# Patient Record
Sex: MALE | Race: WHITE | Employment: OTHER | URBAN - METROPOLITAN AREA
[De-identification: names, ages, dates, MRNs, and addresses within clinical notes are randomized per-mention and may not be internally consistent; named-entity substitution may affect disease eponyms.]

---

## 2017-07-07 ENCOUNTER — HOSPITAL ENCOUNTER (OUTPATIENT)
Dept: MRI IMAGING | Age: 57
Discharge: HOME | End: 2017-07-07
Attending: FAMILY MEDICINE
Payer: COMMERCIAL

## 2017-07-07 DIAGNOSIS — G89.29 CHRONIC LOW BACK PAIN, UNSPECIFIED BACK PAIN LATERALITY, WITH SCIATICA PRESENCE UNSPECIFIED: ICD-10-CM

## 2017-07-07 DIAGNOSIS — M54.5 CHRONIC LOW BACK PAIN, UNSPECIFIED BACK PAIN LATERALITY, WITH SCIATICA PRESENCE UNSPECIFIED: ICD-10-CM

## 2017-07-07 PROCEDURE — 72148 MRI LUMBAR SPINE W/O DYE: CPT

## 2017-08-09 ENCOUNTER — HOSPITAL ENCOUNTER (OUTPATIENT)
Dept: PHYSICAL THERAPY | Age: 57
Discharge: HOME | End: 2017-08-09
Payer: COMMERCIAL

## 2017-08-09 PROCEDURE — 97162 PT EVAL MOD COMPLEX 30 MIN: CPT

## 2017-08-09 NOTE — PROGRESS NOTES
Matilde Ricks  : 1960 [address] at [place] [address] [address] [place] [address], [facility], [address] [place] [address]  [phone]   [phone]         OUTPATIENT PHYSICAL THERAPY:Initial Assessment 2017    ICD-10: Treatment Diagnosis: low back pain (M54.5)  Precautions/Allergies:   Review of patient's allergies indicates no known allergies. Fall Risk Score: 2 (? 5 = High Risk)  MD Orders: evaluate and treat MEDICAL/REFERRING DIAGNOSIS:  Low back pain [M54.5]   DATE OF ONSET: chronic   REFERRING PHYSICIAN: Milagro Stephenson MD  RETURN PHYSICIAN APPOINTMENT: 10/9/17     INITIAL ASSESSMENT:  Mr. Kris Ochoa presents significant functional limitations due to back pain. PT evaluation reveals significant weakness of core and LE musculature, decreased functional AROM lumbar and B hip joints, altered spinal alignment, and poor pain management. Pt would highly benefit from skilled PT to address problems below and improve overall functional mobility. PROBLEM LIST (Impacting functional limitations):  1. Decreased Strength  2. Decreased ADL/Functional Activities  3. Increased Pain  4. Decreased Flexibility/Joint Mobility  5. Decreased [place] with Home Exercise Program INTERVENTIONS PLANNED:  1. Cold  2. Heat  3. Home Exercise Program (HEP)  4. Manual Therapy  5. Neuromuscular Re-education/Strengthening  6. Range of Motion (ROM)  7. Therapeutic Exercise/Strengthening   TREATMENT PLAN:  Effective Dates: 17 TO 10/9/17. Frequency/Duration: 3 times a week for 6 weeks  GOALS: (Goals have been discussed and agreed upon with patient.)  SHORT-TERM FUNCTIONAL GOALS: Time Frame: 2-3 weeks   1. Pt will be independent with HEP focusing on core stability and promoting good spinal alignment. 2. Pt will report no symptoms of LE for 1-2 weeks demonstrating centralization of symptoms.   3. Pt will report pain level does not exceed 5/10 in a 1-2 week period of time to demonstrate pain management. 4. Pt will report improved sleep with less disturbance from back pain. DISCHARGE GOALS: Time Frame: 4-6 weeks   1. Pt will improve Oswestry score by at least 10 points. 2. Pt will demonstrate increased AROM of lumbar spine all planes ~ 25% or greater without report of pain to improve functional mobility. 3. Pt will be able to sustain regular exercise routine including aerobic exercise 3+ times per week without increased symptoms to encourage healthy lifestyle. 4. Pt will demonstrate increased B LE strength to at least 4+/5 B to aid with functional mobility. Rehabilitation Potential For Stated Goals: Good  Regarding Marvin Hammond's therapy, I certify that the treatment plan above will be carried out by a therapist or under their direction. Thank you for this referral,  Pauly Lopez, PT     Referring Physician Signature: Izaiah Rosales MD              Date                    The information in this section was collected on 8/7/17 (except where otherwise noted). HISTORY:   History of Present Injury/Illness (Reason for Referral):  Pt reports several years low back pain with a severe episode after lifting heavy objects in 2013 causing left low back L LE pain. Pt states that he has been receiving chiropractic treatments since then for pain management however, pain continued to bother him especially during/after work shifts at [employer]. He states that he is on his feet on concrete all day long and recently back pain interfering with job activities. In 3 months ago, pt reports he called off work due to pain too severe to perform work activities. Went to MD, imaging showing disc protrusion at L3 and stenosis at L1-2,  L5-S1. Referred to neuro specialist. Epidural injection ordered (not scheduled yet). PT referral with Neuro follow up in 4 weeks. Present symptoms (on day of initial evaluation): constant low back aching, mid to low back.   Occasional numbness of B LE (R>L) · Aggravating factors: standing on hard surfaces 5 min, walking, stairs ambulation, bending   · Relieving factors: recliner, pain medication     · Pain level: 6/10 presently, 9/10 worst, 5/10 best     Past Medical History/Comorbidities:   Mr. Markos Begum  has a past medical history of Allergic rhinitis due to allergen; Arthritis; Colon polyps (8/12/2016); Deficiency, lipoprotein (8/12/2016); Diabetes mellitus type 2, controlled ([facility] [address] (8/12/2016); Encounter for long-term (current) use of medications (8/12/2016); HLD (hyperlipidemia) (8/12/2016); HTN (hypertension) (8/12/2016); Obesity (8/12/2016); Other abnormal glucose (8/12/2016); Sinusitis, acute maxillary (8/12/2016); and URI (upper respiratory infection) (8/12/2016). Mr. Markos Begum  has a past surgical history that includes tonsillectomy; adenoidectomy; and other surgical (2005). Social History/Living Environment:     lives with spouse   Prior Level of Function/Work/Activity:  Currently on disability   Dominant Side:         RIGHT  Other Clinical Tests:          MRI lumbar spine: (copied from radiology)   Disc desiccation and mild degenerative changes and facet arthropathy  throughout the lumbar spine. There is a significant central stenosis at L1-2;  the thecal sac measures 6 to 7 mm in AP diameter. At L4-3-4 there is a  broad-based left lateral disc protrusion impinging the enlarged L3 nerve root. Moderate foraminal stenoses at L5-S1  Previous Treatment Approaches:          Chiropractor   Current Medications:       Current Outpatient Prescriptions:     multivitamin (ONE A DAY) tablet, Take 1 Tab by mouth daily. , Disp: , Rfl:     Insulin Needles, Disposable, (NOVOFINE 32) 32 gauge x 1/4\" ndle, 1 misc daily. For Victoza pen, Disp: 100 Pen Needle, Rfl: 3    traMADol (ULTRAM) 50 mg tablet, Take 1 Tab by mouth every six (6) hours as needed for Pain. Max Daily Amount: 200 mg.  Indications: Pain, Disp: 120 Tab, Rfl: 2    metoprolol succinate (TOPROL-XL) 100 mg tablet, Take 1 Tab by mouth daily. , Disp: 90 Tab, Rfl: 3    metroNIDAZOLE (METROGEL) 1 % topical gel, Apply 1 g to affected area daily. Use a thin layer to affected areas after washing, Disp: 60 g, Rfl: 5    lansoprazole (PREVACID) 30 mg capsule, Take 1 Cap by mouth Daily (before breakfast). , Disp: 90 Cap, Rfl: 3    lisinopril-hydroCHLOROthiazide (PRINZIDE, ZESTORETIC) 20-12.5 mg per tablet, Take 1 Tab by mouth daily. , Disp: 90 Tab, Rfl: 3    simvastatin (ZOCOR) 40 mg tablet, Take 1 Tab by mouth daily. , Disp: 90 Tab, Rfl: 3    Liraglutide (VICTOZA) 0.6 mg/0.1 mL (18 mg/3 mL) sub-q pen, 19mg/3ml pen         1.8mg per day  Indications: type 2 diabetes mellitus, Disp: 9 Syringe, Rfl: 3    doxycycline (ADOXA) 100 mg tablet, Take 1 Tab by mouth two (2) times a day. Indications: ACNE ROSACEA, Disp: 100 Tab, Rfl: 2    PEN NEEDLE, DIABETIC (NOVOFINE 32), 32 g by Does Not Apply route daily.  Indications: 32G x 6 MM,, Disp: , Rfl:    Date Last Reviewed:  8/9/2017     Number of Personal Factors/Comorbidities that affect the Plan of Care: 1-2: MODERATE COMPLEXITY   EXAMINATION:   Observation/Orthostatic Postural Assessment:          Standing:  · Left scap elevated   · B hips in slight flexion   · Decreased lumbar lordotic curve  · Left lumbar shift   Palpation:         Tender of mid lumbar region   ROM:            Lumbar spine Date:  8/9/17 Date:   Date:     Direction  Parameters Parameters Parameters   Flexion  25% cs     Extension  25%     Rotation  R: 25%  L: 15%     Side bending  R: 50%  L: 25% cs      Hip IR Very limited B      Hip ER Mild limitation B cs     Hip flexion  R: 90 degrees  L: 100 degrees cs     Cs= comparable sign (symptoms provoked)    Strength:            Lower quadrant    DATE  8/9/17 DATE     Hip flexion R: 4-  L: 4- R:   L:    Hip Abduction  R: 4-  L: 4- R:   L:    Hip Extension  R: --  L: -- R:   L:    Knee Flexion  R: 4  L: 4- R:   L:    Knee Extension  R: 4+  L: 4- R:   L:    Ankle Dorsiflexion  R: 4 L: 4 R:   L:   Ankle Plantarflexion  R: 4  L:4  R:  L:          Special Tests:          None tested   Neurological Screen:        Myotomes: weakness generalized, not specific to lumbar nerve roots         Sensation: intact B LE         Reflexes: will test next visit   Functional Mobility:         Gait/Ambulation:  WBOS, ER B hips, increased lateral sway. Transfers:  Use of B UE sit to stand         Stairs:  Not tested    Body Structures Involved:  1. Joints  2. Muscles Body Functions Affected:  1. Sensory/Pain  2. Neuromusculoskeletal  3. Movement Related Activities and Participation Affected:  1. General Tasks and Demands  2. Mobility  3. Community, Social and [place]   Number of elements that affect the Plan of Care: 3: MODERATE COMPLEXITY   CLINICAL PRESENTATION:   Presentation: Evolving clinical presentation with changing clinical characteristics: MODERATE COMPLEXITY   CLINICAL DECISION MAKING:   Outcome Measure: Tool Used: Modified Oswestry Low Back Pain Questionnaire  Score:  Initial: 38/50  Most Recent: X/50 (Date: -- )   Interpretation of Score: Each section is scored on a 0-5 scale, 5 representing the greatest disability. The scores of each section are added together for a total score of 50. Medical Necessity:   · Patient is expected to demonstrate progress in strength and range of motion to decrease assistance required with ADLs as well as work related activities. .  Reason for Services/Other Comments:  · Patient continues to require modification of therapeutic interventions to increase complexity of exercises.    Use of outcome tool(s) and clinical judgement create a POC that gives a: Questionable prediction of patient's progress: MODERATE COMPLEXITY            TREATMENT:   (In addition to Assessment/Re-Assessment sessions the following treatments were rendered)  Pre-treatment Symptoms/Complaints:  See above    Pain: Initial:     6/10 Post Session:  6/10     THERAPEUTIC EXERCISE: (5 minutes):  Exercises per grid below to improve mobility, strength and coordination. Required moderate visual, verbal and manual cues to promote proper body alignment, promote proper body posture and promote proper body mechanics. Progressed resistance, range and repetitions as indicated. Date:  8/9/17 Date:   Date:     Activity/Exercise Parameters Parameters Parameters   Education  Breathing, decreasing muscle guarding      LTR X 5 B      nustep  Next visit      Anterior/posterior hip stretching  Next visit      bridge Next visit      multif (opp heel and elbow into mat)--supine  Next visit              Treatment/Session Assessment:    · Response to Treatment:  Pt needed cuing not to hold breathe and to decreased muscle guarding during HEP. · Compliance with Program/Exercises: Will assess as treatment progresses.   · Recommendations/Intent for next treatment session:  Mobility exercises, ice, manual mobs to improve intervertebral movement       Future Appointments  Date Time Provider Jonas [place]   8/11/2017 10:00 AM Sidney Morales, PT [facility]   8/14/2017 10:00 AM Sidney Morales, PT [facility]   8/16/2017 11:00 AM David Blunt, PT [facility]   8/18/2017 10:00 AM Sidney Morales, PT [facility]   8/21/2017 11:00 AM Manchester Blunt, PT [facility]   8/23/2017 11:00 AM Manchester Blunt, PT [facility]   8/25/2017 11:00 AM David Blunt, PT [facility]   8/28/2017 11:00 AM David Blunt, PT [facility]   8/30/2017 11:00 AM David Blunt, PT [facility]   9/1/2017 10:00 AM Sidney Morales, PT [facility]   9/6/2017 11:00 AM Manchester Blunt, PT [facility]   9/8/2017 10:00 AM Sidney Morales, PT [facility]   9/11/2017 11:00 AM Manchester Blunt, PT [facility]   9/13/2017 10:00 AM David Blunt, PT [facility]   9/15/2017 11:00 AM Manchester Blunt, PT [facility]   10/9/2017 12:30 PM Aleksandr Rodriguez MD [address]   10/11/2017 3:00 PM Travon Adams MD [facility]   10/23/2017 10:00 AM No Todd, [facility]       Total Treatment Duration:  PT Patient Time In/Time Out  Time In: 0370  Time Out: [id number] [facility],

## 2017-08-09 NOTE — PROGRESS NOTES
Ambulatory/Rehab Services H2 Model Falls Risk Assessment    Risk Factor Pts. ·   Confusion/Disorientation/Impulsivity  []    4 ·   Symptomatic Depression  []   2 ·   Altered Elimination  []   1 ·   Dizziness/Vertigo  []   1 ·   Gender (Male)  [x]   1 ·   Any administered antiepileptics (anticonvulsants):  []   2 ·   Any administered benzodiazepines:  []   1 ·   Visual Impairment (specify):  []   1 ·   Portable Oxygen Use  []   1 ·   Orthostatic ? BP  []   1 ·   History of Recent Falls (within 3 mos.)  []   5     Ability to Rise from Chair (choose one) Pts. ·   Ability to rise in a single movement  []   0 ·   Pushes up, successful in one attempt  [x]   1 ·   Multiple attempts, but successful  []   3 ·   Unable to rise without assistance  []   4   Total: (5 or greater = High Risk) 2     Falls Prevention Plan:   []                Physical Limitations to Exercise (specify):   []                Mobility Assistance Device (type):   []                Exercise/Equipment Adaptation (specify):    ©2010 [facility] of [address] Patent #[id number].  Federal Law prohibits the replication, distribution or use without written permission from [facility] [employer]

## 2017-08-11 ENCOUNTER — HOSPITAL ENCOUNTER (OUTPATIENT)
Dept: PHYSICAL THERAPY | Age: 57
Discharge: HOME | End: 2017-08-11
Payer: COMMERCIAL

## 2017-08-11 PROCEDURE — 97110 THERAPEUTIC EXERCISES: CPT | Performed by: PHYSICAL THERAPIST

## 2017-08-11 NOTE — PROGRESS NOTES
Lennox Zamora  : 1960 [id number] at [place] [address] [address] [facility], [address]  [phone]   [phone]         OUTPATIENT PHYSICAL THERAPY:Daily Note 2017    ICD-10: Treatment Diagnosis: low back pain (M54.5)  Precautions/Allergies:   Review of patient's allergies indicates no known allergies. Fall Risk Score: 2 (? 5 = High Risk)  MD Orders: evaluate and treat MEDICAL/REFERRING DIAGNOSIS:  Low back pain [M54.5]   DATE OF ONSET: chronic   REFERRING PHYSICIAN: Harpal Worley MD  RETURN PHYSICIAN APPOINTMENT: 10/9/17     INITIAL ASSESSMENT:  Mr. Tracy Hogan presents significant functional limitations due to back pain. PT evaluation reveals significant weakness of core and LE musculature, decreased functional AROM lumbar and B hip joints, altered spinal alignment, and poor pain management. Pt would highly benefit from skilled PT to address problems below and improve overall functional mobility. PROBLEM LIST (Impacting functional limitations):  1. Decreased Strength  2. Decreased ADL/Functional Activities  3. Increased Pain  4. Decreased Flexibility/Joint Mobility  5. Decreased [place] with Home Exercise Program INTERVENTIONS PLANNED:  1. Cold  2. Heat  3. Home Exercise Program (HEP)  4. Manual Therapy  5. Neuromuscular Re-education/Strengthening  6. Range of Motion (ROM)  7. Therapeutic Exercise/Strengthening   TREATMENT PLAN:  Effective Dates: 17 TO 10/9/17. Frequency/Duration: 3 times a week for 6 weeks  GOALS: (Goals have been discussed and agreed upon with patient.)  SHORT-TERM FUNCTIONAL GOALS: Time Frame: 2-3 weeks   1. Pt will be independent with HEP focusing on core stability and promoting good spinal alignment. 2. Pt will report no symptoms of LE for 1-2 weeks demonstrating centralization of symptoms. 3. Pt will report pain level does not exceed 5/10 in a 1-2 week period of time to demonstrate pain management.   4. Pt will report improved sleep with less disturbance from back pain. DISCHARGE GOALS: Time Frame: 4-6 weeks   1. Pt will improve Oswestry score by at least 10 points. 2. Pt will demonstrate increased AROM of lumbar spine all planes ~ 25% or greater without report of pain to improve functional mobility. 3. Pt will be able to sustain regular exercise routine including aerobic exercise 3+ times per week without increased symptoms to encourage healthy lifestyle. 4. Pt will demonstrate increased B LE strength to at least 4+/5 B to aid with functional mobility. Rehabilitation Potential For Stated Goals: Good  Regarding Shania Watermancristopher Hammond's therapy, I certify that the treatment plan above will be carried out by a therapist or under their direction. Thank you for this referral,  Gaviota Jenkins, PT               The information in this section was collected on 8/7/17 (except where otherwise noted). HISTORY:   History of Present Injury/Illness (Reason for Referral):  Pt reports several years low back pain with a severe episode after lifting heavy objects in 2013 causing left low back L LE pain. Pt states that he has been receiving chiropractic treatments since then for pain management however, pain continued to bother him especially during/after work shifts at [employer]. He states that he is on his feet on concrete all day long and recently back pain interfering with job activities. In 3 months ago, pt reports he called off work due to pain too severe to perform work activities. Went to MD, imaging showing disc protrusion at L3 and stenosis at L1-2,  L5-S1. Referred to neuro specialist. Epidural injection ordered (not scheduled yet). PT referral with Neuro follow up in 4 weeks. Present symptoms (on day of initial evaluation): constant low back aching, mid to low back.   Occasional numbness of B LE (R>L)      · Aggravating factors: standing on hard surfaces 5 min, walking, stairs ambulation, bending   · Relieving factors: recliner, pain medication     · Pain level: 6/10 presently, 9/10 worst, 5/10 best     Past Medical History/Comorbidities:   Mr. Francis Olson  has a past medical history of Allergic rhinitis due to allergen; Arthritis; Colon polyps (8/12/2016); Deficiency, lipoprotein (8/12/2016); Diabetes mellitus type 2, controlled ([facility] [address] (8/12/2016); Encounter for long-term (current) use of medications (8/12/2016); HLD (hyperlipidemia) (8/12/2016); HTN (hypertension) (8/12/2016); Obesity (8/12/2016); Other abnormal glucose (8/12/2016); Sinusitis, acute maxillary (8/12/2016); and URI (upper respiratory infection) (8/12/2016). Mr. Francis Olson  has a past surgical history that includes tonsillectomy; adenoidectomy; and other surgical (2005). Social History/Living Environment:     lives with spouse   Prior Level of Function/Work/Activity:  Currently on disability   Dominant Side:         RIGHT  Other Clinical Tests:          MRI lumbar spine: (copied from radiology)   Disc desiccation and mild degenerative changes and facet arthropathy  throughout the lumbar spine. There is a significant central stenosis at L1-2;  the thecal sac measures 6 to 7 mm in AP diameter. At L4-3-4 there is a  broad-based left lateral disc protrusion impinging the enlarged L3 nerve root. Moderate foraminal stenoses at L5-S1  Previous Treatment Approaches:          Chiropractor   Current Medications:       Current Outpatient Prescriptions:     multivitamin (ONE A DAY) tablet, Take 1 Tab by mouth daily. , Disp: , Rfl:     Insulin Needles, Disposable, (NOVOFINE 32) 32 gauge x 1/4\" ndle, 1 misc daily. For Victoza pen, Disp: 100 Pen Needle, Rfl: 3    traMADol (ULTRAM) 50 mg tablet, Take 1 Tab by mouth every six (6) hours as needed for Pain. Max Daily Amount: 200 mg. Indications: Pain, Disp: 120 Tab, Rfl: 2    metoprolol succinate (TOPROL-XL) 100 mg tablet, Take 1 Tab by mouth daily. , Disp: 90 Tab, Rfl: 3    metroNIDAZOLE (METROGEL) 1 % topical gel, Apply 1 g to affected area daily. Use a thin layer to affected areas after washing, Disp: 60 g, Rfl: 5    lansoprazole (PREVACID) 30 mg capsule, Take 1 Cap by mouth Daily (before breakfast). , Disp: 90 Cap, Rfl: 3    lisinopril-hydroCHLOROthiazide (PRINZIDE, ZESTORETIC) 20-12.5 mg per tablet, Take 1 Tab by mouth daily. , Disp: 90 Tab, Rfl: 3    simvastatin (ZOCOR) 40 mg tablet, Take 1 Tab by mouth daily. , Disp: 90 Tab, Rfl: 3    Liraglutide (VICTOZA) 0.6 mg/0.1 mL (18 mg/3 mL) sub-q pen, 19mg/3ml pen         1.8mg per day  Indications: type 2 diabetes mellitus, Disp: 9 Syringe, Rfl: 3    doxycycline (ADOXA) 100 mg tablet, Take 1 Tab by mouth two (2) times a day. Indications: ACNE ROSACEA, Disp: 100 Tab, Rfl: 2    PEN NEEDLE, DIABETIC (NOVOFINE 32), 32 g by Does Not Apply route daily.  Indications: 32G x 6 MM,, Disp: , Rfl:    Date Last Reviewed:  8/11/2017     EXAMINATION:   Observation/Orthostatic Postural Assessment:          Standing:  · Left scap elevated   · B hips in slight flexion   · Decreased lumbar lordotic curve  · Left lumbar shift   Palpation:         Tender of mid lumbar region   ROM:            Lumbar spine Date:  8/9/17 Date:   Date:     Direction  Parameters Parameters Parameters   Flexion  25% cs     Extension  25%     Rotation  R: 25%  L: 15%     Side bending  R: 50%  L: 25% cs      Hip IR Very limited B      Hip ER Mild limitation B cs     Hip flexion  R: 90 degrees  L: 100 degrees cs     Cs= comparable sign (symptoms provoked)    Strength:            Lower quadrant    DATE  8/9/17 DATE     Hip flexion R: 4-  L: 4- R:   L:    Hip Abduction  R: 4-  L: 4- R:   L:    Hip Extension  R: --  L: -- R:   L:    Knee Flexion  R: 4  L: 4- R:   L:    Knee Extension  R: 4+  L: 4- R:   L:    Ankle Dorsiflexion  R: 4   L: 4 R:   L:   Ankle Plantarflexion  R: 4  L:4  R:  L:          Special Tests:          None tested   Neurological Screen:        Myotomes: weakness generalized, not specific to lumbar nerve roots Sensation: intact B LE         Reflexes: will test next visit   Functional Mobility:         Gait/Ambulation:  WBOS, ER B hips, increased lateral sway. Transfers:  Use of B UE sit to stand         Stairs:  Not tested    CLINICAL DECISION MAKING:   Outcome Measure: Tool Used: Modified Oswestry Low Back Pain Questionnaire  Score:  Initial: 38/50  Most Recent: X/50 (Date: -- )   Interpretation of Score: Each section is scored on a 0-5 scale, 5 representing the greatest disability. The scores of each section are added together for a total score of 50. Medical Necessity:   · Patient is expected to demonstrate progress in strength and range of motion to decrease assistance required with ADLs as well as work related activities. .  Reason for Services/Other Comments:  · Patient continues to require modification of therapeutic interventions to increase complexity of exercises. TREATMENT:   (In addition to Assessment/Re-Assessment sessions the following treatments were rendered)  Pre-treatment Symptoms/Complaints: Pt states \" I can really feel those exercises in my back. \" Pt c/o lumbar and hip soreness this am. He states compliance with HEP 2-3x/day. Pain: Initial:     6-7/10 Post Session:  5-6/10     THERAPEUTIC EXERCISE: (50minutes):  Exercises per grid below to improve mobility, strength and coordination. Required moderate visual, verbal and manual cues to promote proper body alignment, promote proper body posture and promote proper body mechanics. Progressed resistance, range and repetitions as indicated.    Date:  8/9/17 Date:  8/11/17 Date:     Activity/Exercise Parameters Parameters Parameters   Education  Breathing, decreasing muscle guarding  HEP 2-3x/daily,Log-rolling, breathing out while tightening muscles and in when relaxing    LTR X 5 B  12x B     nustep  Next visit  0 resist/5min    TAs  15 x 5 sec    Supine add sq w/ TAs  15 x 5 sec    Supine hip ABD w/ TAs  15 x 5sec    SKTC  10 x 10sec B    Anterior/posterior hip stretching  Next visit      bridge Next visit  Next visit    multif (opp heel and elbow into mat)--supine  Next visit  Next visit            Treatment/Session Assessment:  Pt remains guarded and fearful to move. He was educated on importance of gentle movement/strengthening/stretching to improve core strength/posture, as well as, overall functional mobility. He required vc to avoid holding his breath while exercising and to exhale on the \"working\" part of the activity and inhale when relaxing. · Response to Treatment: Pt was challenged by all activities above due to core weakness and poor muscle endurance. He required multiple rest breaks throughout treatment. He was able to participate more effectively when distracted by talking about his grandson. He c/o min increase in anterior hip discomfort, L > R during/after Nu-step and supine mat exercises with knees bent. · Compliance with Program/Exercises: Pt states compliance with HEP 2x daily. · Recommendations/Intent for next treatment session: Progress stretching and strengthening exercises to aide with improving pt's mobility while decreasing pain and tightness. Pt may also benefit from manual joint mobilizations and/or IFES to further improve joint and tissue mobility. Try modified HS/GS  and ant/post piriformis stretching, as well as, bridging and multifidi engagement if pt tolerates.     Future Appointments  Date Time Provider Jonas Denton   8/16/2017 11:00 AM Gael Loyola, PT [facility]   8/18/2017 10:00 AM Becky Weaver PT [facility]   8/21/2017 11:00 AM Gael L[facility]ola, PT [facility]   8/23/2017 11:00 AM [facility]   8/25/2017 11:00 AM Gael Nir, PT [facility]   8/28/2017 11:00 AM [facility]   8/30/2017 11:00 AM Gael Loyola, PT [facility]   9/1/2017 10:00 AM Becky Weaver, PT [facility] 9/6/2017 11:00 AM María Kumar, PT [facility]   9/8/2017 10:00 AM [facility]   9/11/2017 11:00 AM Enrikeadlucaren Kumar, PT [facility]   9/13/2017 10:00 AM Guadlucaren Kumar, [facility]15/2017 11:00 AM [facility]   10/9/2017 12:30 PM MD [address]   10/11/2017 3:00 PM Helen Aldana MD [facility]   10/23/2017 10:00 AM [facility]uthi White[facility]       Total Treatment Duration:  PT Patient Time In/Time Out  Time In: 1005  Time Out: [address], PT

## 2017-08-14 ENCOUNTER — APPOINTMENT (OUTPATIENT)
Dept: PHYSICAL THERAPY | Age: 57
End: 2017-08-14
Payer: COMMERCIAL

## 2017-08-16 ENCOUNTER — HOSPITAL ENCOUNTER (OUTPATIENT)
Dept: PHYSICAL THERAPY | Age: 57
Discharge: HOME | End: 2017-08-16
Payer: COMMERCIAL

## 2017-08-16 PROCEDURE — 97110 THERAPEUTIC EXERCISES: CPT

## 2017-08-16 NOTE — PROGRESS NOTES
Lennox Zamora  : 1960 [id number] at [place] [address] [address] [place] [address], [facility], [address] [place] [address]  [phone]   [phone]         OUTPATIENT PHYSICAL THERAPY:Daily Note 2017    ICD-10: Treatment Diagnosis: low back pain (M54.5)  Precautions/Allergies:   Review of patient's allergies indicates no known allergies. Fall Risk Score: 2 (? 5 = High Risk)  MD Orders: evaluate and treat MEDICAL/REFERRING DIAGNOSIS:  Low back pain [M54.5]   DATE OF ONSET: chronic   REFERRING PHYSICIAN: Harpal Worley MD  RETURN PHYSICIAN APPOINTMENT: 10/9/17     INITIAL ASSESSMENT:  Mr. Tracy Hogan presents significant functional limitations due to back pain. PT evaluation reveals significant weakness of core and LE musculature, decreased functional AROM lumbar and B hip joints, altered spinal alignment, and poor pain management. Pt would highly benefit from skilled PT to address problems below and improve overall functional mobility. PROBLEM LIST (Impacting functional limitations):  1. Decreased Strength  2. Decreased ADL/Functional Activities  3. Increased Pain  4. Decreased Flexibility/Joint Mobility  5. Decreased [place] with Home Exercise Program INTERVENTIONS PLANNED:  1. Cold  2. Heat  3. Home Exercise Program (HEP)  4. Manual Therapy  5. Neuromuscular Re-education/Strengthening  6. Range of Motion (ROM)  7. Therapeutic Exercise/Strengthening   TREATMENT PLAN:  Effective Dates: 17 TO 10/9/17. Frequency/Duration: 3 times a week for 6 weeks  GOALS: (Goals have been discussed and agreed upon with patient.)  SHORT-TERM FUNCTIONAL GOALS: Time Frame: 2-3 weeks   1. Pt will be independent with HEP focusing on core stability and promoting good spinal alignment. 2. Pt will report no symptoms of LE for 1-2 weeks demonstrating centralization of symptoms. 3. Pt will report pain level does not exceed 5/10 in a 1-2 week period of time to demonstrate pain management.   4. Pt will report improved sleep with less disturbance from back pain. DISCHARGE GOALS: Time Frame: 4-6 weeks   1. Pt will improve Oswestry score by at least 10 points. 2. Pt will demonstrate increased AROM of lumbar spine all planes ~ 25% or greater without report of pain to improve functional mobility. 3. Pt will be able to sustain regular exercise routine including aerobic exercise 3+ times per week without increased symptoms to encourage healthy lifestyle. 4. Pt will demonstrate increased B LE strength to at least 4+/5 B to aid with functional mobility. Rehabilitation Potential For Stated Goals: Good  Regarding Naman Hammond's therapy, I certify that the treatment plan above will be carried out by a therapist or under their direction. Thank you for this referral,  Antoni Mckenna, PT               The information in this section was collected on 8/7/17 (except where otherwise noted). HISTORY:   History of Present Injury/Illness (Reason for Referral):  Pt reports several years low back pain with a severe episode after lifting heavy objects in 2013 causing left low back L LE pain. Pt states that he has been receiving chiropractic treatments since then for pain management however, pain continued to bother him especially during/after work shifts at [employer]. He states that he is on his feet on concrete all day long and recently back pain interfering with job activities. In 3 months ago, pt reports he called off work due to pain too severe to perform work activities. Went to MD, imaging showing disc protrusion at L3 and stenosis at L1-2,  L5-S1. Referred to neuro specialist. Epidural injection ordered (not scheduled yet). PT referral with Neuro follow up in 4 weeks. Present symptoms (on day of initial evaluation): constant low back aching, mid to low back.   Occasional numbness of B LE (R>L)      · Aggravating factors: standing on hard surfaces 5 min, walking, stairs ambulation, bending   · Relieving factors: recliner, pain medication     · Pain level: 6/10 presently, 9/10 worst, 5/10 best     Past Medical History/Comorbidities:   Mr. Ifrah Lieberman  has a past medical history of Allergic rhinitis due to allergen; Arthritis; Colon polyps (8/12/2016); Deficiency, lipoprotein (8/12/2016); Diabetes mellitus type 2, controlled ([facility] [address] (8/12/2016); Encounter for long-term (current) use of medications (8/12/2016); HLD (hyperlipidemia) (8/12/2016); HTN (hypertension) (8/12/2016); Obesity (8/12/2016); Other abnormal glucose (8/12/2016); Sinusitis, acute maxillary (8/12/2016); and URI (upper respiratory infection) (8/12/2016). Mr. Ifrah Lieberman  has a past surgical history that includes tonsillectomy; adenoidectomy; and other surgical (2005). Social History/Living Environment:     lives with spouse   Prior Level of Function/Work/Activity:  Currently on disability   Dominant Side:         RIGHT  Other Clinical Tests:          MRI lumbar spine: (copied from radiology)   Disc desiccation and mild degenerative changes and facet arthropathy  throughout the lumbar spine. There is a significant central stenosis at L1-2;  the thecal sac measures 6 to 7 mm in AP diameter. At L4-3-4 there is a  broad-based left lateral disc protrusion impinging the enlarged L3 nerve root. Moderate foraminal stenoses at L5-S1  Previous Treatment Approaches:          Chiropractor   Current Medications:       Current Outpatient Prescriptions:     multivitamin (ONE A DAY) tablet, Take 1 Tab by mouth daily. , Disp: , Rfl:     Insulin Needles, Disposable, (NOVOFINE 32) 32 gauge x 1/4\" ndle, 1 misc daily. For Victoza pen, Disp: 100 Pen Needle, Rfl: 3    traMADol (ULTRAM) 50 mg tablet, Take 1 Tab by mouth every six (6) hours as needed for Pain. Max Daily Amount: 200 mg. Indications: Pain, Disp: 120 Tab, Rfl: 2    metoprolol succinate (TOPROL-XL) 100 mg tablet, Take 1 Tab by mouth daily. , Disp: 90 Tab, Rfl: 3    metroNIDAZOLE (METROGEL) 1 % topical gel, Apply 1 g to affected area daily. Use a thin layer to affected areas after washing, Disp: 60 g, Rfl: 5    lansoprazole (PREVACID) 30 mg capsule, Take 1 Cap by mouth Daily (before breakfast). , Disp: 90 Cap, Rfl: 3    lisinopril-hydroCHLOROthiazide (PRINZIDE, ZESTORETIC) 20-12.5 mg per tablet, Take 1 Tab by mouth daily. , Disp: 90 Tab, Rfl: 3    simvastatin (ZOCOR) 40 mg tablet, Take 1 Tab by mouth daily. , Disp: 90 Tab, Rfl: 3    Liraglutide (VICTOZA) 0.6 mg/0.1 mL (18 mg/3 mL) sub-q pen, 19mg/3ml pen         1.8mg per day  Indications: type 2 diabetes mellitus, Disp: 9 Syringe, Rfl: 3    doxycycline (ADOXA) 100 mg tablet, Take 1 Tab by mouth two (2) times a day. Indications: ACNE ROSACEA, Disp: 100 Tab, Rfl: 2    PEN NEEDLE, DIABETIC (NOVOFINE 32), 32 g by Does Not Apply route daily.  Indications: 32G x 6 MM,, Disp: , Rfl:    Date Last Reviewed:  8/16/2017     EXAMINATION:   Observation/Orthostatic Postural Assessment:          Standing:  · Left scap elevated   · B hips in slight flexion   · Decreased lumbar lordotic curve  · Left lumbar shift   Palpation:         Tender of mid lumbar region   ROM:            Lumbar spine Date:  8/9/17 Date:   Date:     Direction  Parameters Parameters Parameters   Flexion  25% cs     Extension  25%     Rotation  R: 25%  L: 15%     Side bending  R: 50%  L: 25% cs      Hip IR Very limited B      Hip ER Mild limitation B cs     Hip flexion  R: 90 degrees  L: 100 degrees cs     Cs= comparable sign (symptoms provoked)    Strength:            Lower quadrant    DATE  8/9/17 DATE     Hip flexion R: 4-  L: 4- R:   L:    Hip Abduction  R: 4-  L: 4- R:   L:    Hip Extension  R: --  L: -- R:   L:    Knee Flexion  R: 4  L: 4- R:   L:    Knee Extension  R: 4+  L: 4- R:   L:    Ankle Dorsiflexion  R: 4   L: 4 R:   L:   Ankle Plantarflexion  R: 4  L:4  R:  L:          Special Tests:          None tested   Neurological Screen:        Myotomes: weakness generalized, not specific to lumbar nerve roots Sensation: intact B LE         Reflexes: will test next visit   Functional Mobility:         Gait/Ambulation:  WBOS, ER B hips, increased lateral sway. Transfers:  Use of B UE sit to stand         Stairs:  Not tested    CLINICAL DECISION MAKING:   Outcome Measure: Tool Used: Modified Oswestry Low Back Pain Questionnaire  Score:  Initial: 38/50  Most Recent: X/50 (Date: -- )   Interpretation of Score: Each section is scored on a 0-5 scale, 5 representing the greatest disability. The scores of each section are added together for a total score of 50. Medical Necessity:   · Patient is expected to demonstrate progress in strength and range of motion to decrease assistance required with ADLs as well as work related activities. .  Reason for Services/Other Comments:  · Patient continues to require modification of therapeutic interventions to increase complexity of exercises. TREATMENT:   (In addition to Assessment/Re-Assessment sessions the following treatments were rendered)  Pre-treatment Symptoms/Complaints: Pt states that he was very sore for 2 days after last visit but was consistent with HEP over the weekend. Continues to c/o significant stiffness in AM.    Pain: Initial:     5-6/10 Post Session:  3-4/10     THERAPEUTIC EXERCISE: (55 minutes):  Exercises per grid below to improve mobility, strength and coordination. Required moderate visual, verbal and manual cues to promote proper body alignment, promote proper body posture and promote proper body mechanics. Progressed resistance, range and repetitions as indicated.    Date:  8/9/17 Date:  8/11/17 Date:  8/16/17   Activity/Exercise Parameters Parameters Parameters   Education  Breathing, decreasing muscle guarding  HEP 2-3x/daily,Log-rolling, breathing out while tightening muscles and in when relaxing Breathing pattern with all exercises    LTR X 5 B  12x B  5 x 5 sec    nustep  Next visit  0 resist/5min 1.2 resist/ 8 min    TAs  15 x 5 sec 10 x 5   Supine add sq w/ TAs  15 x 5 sec 10 x 5    Supine TrA with mini march    X 5 B (alternating)   Supine hip ABD w/ TAs  15 x 5sec Not today   SKTC  10 x 10sec B 3 x 15 sec with 5 reps of knee extension during last    Anterior/posterior hip stretching  Next visit   Figure 4 with manual IR/ER hip OP 3 x 15 sec left    bridge Next visit  Next visit X 2 difficulty did not add to HEP, but will continue to attempt    multif (opp heel and elbow into mat)--supine  Next visit  Next visit 5 sec x 5          Manual: 3 min to reduce joint compression of left hip  · Med and lateral rotation of left hip with knee in 20 degree flexion resting on bolster; 3 bouts of 20 sec     Modalities : 10 min  · Ice to lumbar/sacral region to reduce inflammation. In supine position with bolster. Treatment/Session Assessment:   · Response to Treatment: pt is tolerating increased exercise ROM, hold times, and repetitions with minimal c/o's. Patient reported groin pain with hip add---reduced pain with manual and figure four stretching of hip. Pt continues to require cuing for breathing techniques during exercise. Reduced pain at end of session today. · Recommendations/Intent for next treatment session: Continue to progress stretching and strengthening exercises to aide with improving pt's mobility while decreasing pain and tightness. Pt may also benefit from manual joint mobilizations and/or IFES to further improve joint and tissue mobility. Try modified HS/GS and bridging.     Future Appointments  Date Time Provider Jonas Denton   8/18/2017 10:00 AM Siria Capellan PT [facility]   8/21/2017 11:00 AM Scott Ramirez PT [facility]   8/23/2017 11:00 AM Scott Ramirez PT [facility]   8/25/2017 11:00 AM Scott Ramirez PT [facility]   8/28/2017 11:00 AM [facility]   8/30/2017 11:00 AM Scott Ramirez PT [facility]   9/1/2017 10:00 AM Doris Galindo Erna, PT [facility]   9/6/2017 11:00 AM Toy Arce, PT [facility]   9/8/2017 10:00 AM Titi Alfaro, PT [facility]   9/11/2017 11:00 AM Toy Arce, PT [facility]   9/13/2017 10:00 AM Toy Arce, PT [facility]   9/15/2017 11:00 AM Toy Arce, PT [facility]   10/9/2017 12:30 PM MD [address]   10/11/2017 3:00 PM Yashira Pena MD [facility]   10/23/2017 10:00 AM Titi Alfaro, PT [facility]       Total Treatment Duration:  PT Patient Time In/Time Out  Time In: 1015  Time Out: [address] Goldberg, PT

## 2017-08-18 ENCOUNTER — HOSPITAL ENCOUNTER (OUTPATIENT)
Dept: PHYSICAL THERAPY | Age: 57
Discharge: HOME | End: 2017-08-18
Payer: COMMERCIAL

## 2017-08-18 PROCEDURE — 97110 THERAPEUTIC EXERCISES: CPT | Performed by: PHYSICAL THERAPIST

## 2017-08-18 NOTE — PROGRESS NOTES
Ernesto Martinez  : 1960 [id number] [facility] [address] at [place] [address] [address] [place] [address], [facility], [address] [place] [address] Road  [phone]   [phone]         OUTPATIENT PHYSICAL THERAPY:Daily Note 2017    ICD-10: Treatment Diagnosis: low back pain (M54.5)  Precautions/Allergies:   Review of patient's allergies indicates no known allergies. Fall Risk Score: 2 (? 5 = High Risk)  MD Orders: evaluate and treat MEDICAL/REFERRING DIAGNOSIS:  Low back pain [M54.5]   DATE OF ONSET: chronic   REFERRING PHYSICIAN: Jahaira Pacheco MD  RETURN PHYSICIAN APPOINTMENT: 10/9/17     INITIAL ASSESSMENT:  Mr. Jessica Thrasher presents significant functional limitations due to back pain. PT evaluation reveals significant weakness of core and LE musculature, decreased functional AROM lumbar and B hip joints, altered spinal alignment, and poor pain management. Pt would highly benefit from skilled PT to address problems below and improve overall functional mobility. PROBLEM LIST (Impacting functional limitations):  1. Decreased Strength  2. Decreased ADL/Functional Activities  3. Increased Pain  4. Decreased Flexibility/Joint Mobility  5. Decreased [place] with Home Exercise Program INTERVENTIONS PLANNED:  1. Cold  2. Heat  3. Home Exercise Program (HEP)  4. Manual Therapy  5. Neuromuscular Re-education/Strengthening  6. Range of Motion (ROM)  7. Therapeutic Exercise/Strengthening   TREATMENT PLAN:  Effective Dates: 17 TO 10/9/17. Frequency/Duration: 3 times a week for 6 weeks  GOALS: (Goals have been discussed and agreed upon with patient.)  SHORT-TERM FUNCTIONAL GOALS: Time Frame: 2-3 weeks   1. Pt will be independent with HEP focusing on core stability and promoting good spinal alignment. 2. Pt will report no symptoms of LE for 1-2 weeks demonstrating centralization of symptoms. 3. Pt will report pain level does not exceed 5/10 in a 1-2 week period of time to demonstrate pain management.   4. Pt will report improved sleep with less disturbance from back pain. DISCHARGE GOALS: Time Frame: 4-6 weeks   1. Pt will improve Oswestry score by at least 10 points. 2. Pt will demonstrate increased AROM of lumbar spine all planes ~ 25% or greater without report of pain to improve functional mobility. 3. Pt will be able to sustain regular exercise routine including aerobic exercise 3+ times per week without increased symptoms to encourage healthy lifestyle. 4. Pt will demonstrate increased B LE strength to at least 4+/5 B to aid with functional mobility. Rehabilitation Potential For Stated Goals: Good  Regarding Jenna Hammond's therapy, I certify that the treatment plan above will be carried out by a therapist or under their direction. Thank you for this referral,  Siria Capellan, PT               The information in this section was collected on 8/7/17 (except where otherwise noted). HISTORY:   History of Present Injury/Illness (Reason for Referral):  Pt reports several years low back pain with a severe episode after lifting heavy objects in 2013 causing left low back L LE pain. Pt states that he has been receiving chiropractic treatments since then for pain management however, pain continued to bother him especially during/after work shifts at [employer]. He states that he is on his feet on concrete all day long and recently back pain interfering with job activities. In 3 months ago, pt reports he called off work due to pain too severe to perform work activities. Went to MD, imaging showing disc protrusion at L3 and stenosis at L1-2,  L5-S1. Referred to neuro specialist. Epidural injection ordered (not scheduled yet). PT referral with Neuro follow up in 4 weeks. Present symptoms (on day of initial evaluation): constant low back aching, mid to low back.   Occasional numbness of B LE (R>L)      · Aggravating factors: standing on hard surfaces 5 min, walking, stairs ambulation, bending   · Relieving factors: recliner, pain medication     · Pain level: 6/10 presently, 9/10 worst, 5/10 best     Past Medical History/Comorbidities:   Mr. Caroline Portillo  has a past medical history of Allergic rhinitis due to allergen; Arthritis; Colon polyps (8/12/2016); Deficiency, lipoprotein (8/12/2016); Diabetes mellitus type 2, controlled ([facility] [address] (8/12/2016); Encounter for long-term (current) use of medications (8/12/2016); HLD (hyperlipidemia) (8/12/2016); HTN (hypertension) (8/12/2016); Obesity (8/12/2016); Other abnormal glucose (8/12/2016); Sinusitis, acute maxillary (8/12/2016); and URI (upper respiratory infection) (8/12/2016). Mr. Caroline Portillo  has a past surgical history that includes tonsillectomy; adenoidectomy; and other surgical (2005). Social History/Living Environment:     lives with spouse   Prior Level of Function/Work/Activity:  Currently on disability   Dominant Side:         RIGHT  Other Clinical Tests:          MRI lumbar spine: (copied from radiology)   Disc desiccation and mild degenerative changes and facet arthropathy  throughout the lumbar spine. There is a significant central stenosis at L1-2;  the thecal sac measures 6 to 7 mm in AP diameter. At L4-3-4 there is a  broad-based left lateral disc protrusion impinging the enlarged L3 nerve root. Moderate foraminal stenoses at L5-S1  Previous Treatment Approaches:          Chiropractor   Current Medications:       Current Outpatient Prescriptions:     multivitamin (ONE A DAY) tablet, Take 1 Tab by mouth daily. , Disp: , Rfl:     Insulin Needles, Disposable, (NOVOFINE 32) 32 gauge x 1/4\" ndle, 1 misc daily. For Victoza pen, Disp: 100 Pen Needle, Rfl: 3    traMADol (ULTRAM) 50 mg tablet, Take 1 Tab by mouth every six (6) hours as needed for Pain. Max Daily Amount: 200 mg. Indications: Pain, Disp: 120 Tab, Rfl: 2    metoprolol succinate (TOPROL-XL) 100 mg tablet, Take 1 Tab by mouth daily. , Disp: 90 Tab, Rfl: 3    metroNIDAZOLE (METROGEL) 1 % topical gel, Apply 1 g to affected area daily. Use a thin layer to affected areas after washing, Disp: 60 g, Rfl: 5    lansoprazole (PREVACID) 30 mg capsule, Take 1 Cap by mouth Daily (before breakfast). , Disp: 90 Cap, Rfl: 3    lisinopril-hydroCHLOROthiazide (PRINZIDE, ZESTORETIC) 20-12.5 mg per tablet, Take 1 Tab by mouth daily. , Disp: 90 Tab, Rfl: 3    simvastatin (ZOCOR) 40 mg tablet, Take 1 Tab by mouth daily. , Disp: 90 Tab, Rfl: 3    Liraglutide (VICTOZA) 0.6 mg/0.1 mL (18 mg/3 mL) sub-q pen, 19mg/3ml pen         1.8mg per day  Indications: type 2 diabetes mellitus, Disp: 9 Syringe, Rfl: 3    doxycycline (ADOXA) 100 mg tablet, Take 1 Tab by mouth two (2) times a day. Indications: ACNE ROSACEA, Disp: 100 Tab, Rfl: 2    PEN NEEDLE, DIABETIC (NOVOFINE 32), 32 g by Does Not Apply route daily.  Indications: 32G x 6 MM,, Disp: , Rfl:    Date Last Reviewed:  8/18/2017     EXAMINATION:   Observation/Orthostatic Postural Assessment:          Standing:  · Left scap elevated   · B hips in slight flexion   · Decreased lumbar lordotic curve  · Left lumbar shift   Palpation:         Tender of mid lumbar region   ROM:            Lumbar spine Date:  8/9/17 Date:   Date:     Direction  Parameters Parameters Parameters   Flexion  25% cs     Extension  25%     Rotation  R: 25%  L: 15%     Side bending  R: 50%  L: 25% cs      Hip IR Very limited B      Hip ER Mild limitation B cs     Hip flexion  R: 90 degrees  L: 100 degrees cs     Cs= comparable sign (symptoms provoked)    Strength:            Lower quadrant    DATE  8/9/17 DATE     Hip flexion R: 4-  L: 4- R:   L:    Hip Abduction  R: 4-  L: 4- R:   L:    Hip Extension  R: --  L: -- R:   L:    Knee Flexion  R: 4  L: 4- R:   L:    Knee Extension  R: 4+  L: 4- R:   L:    Ankle Dorsiflexion  R: 4   L: 4 R:   L:   Ankle Plantarflexion  R: 4  L:4  R:  L:          Special Tests:          None tested   Neurological Screen:        Myotomes: weakness generalized, not specific to lumbar nerve roots Sensation: intact B LE         Reflexes: will test next visit   Functional Mobility:         Gait/Ambulation:  WBOS, ER B hips, increased lateral sway. Transfers:  Use of B UE sit to stand         Stairs:  Not tested    CLINICAL DECISION MAKING:   Outcome Measure: Tool Used: Modified Oswestry Low Back Pain Questionnaire  Score:  Initial: 38/50  Most Recent: X/50 (Date: -- )   Interpretation of Score: Each section is scored on a 0-5 scale, 5 representing the greatest disability. The scores of each section are added together for a total score of 50. Medical Necessity:   · Patient is expected to demonstrate progress in strength and range of motion to decrease assistance required with ADLs as well as work related activities. .  Reason for Services/Other Comments:  · Patient continues to require modification of therapeutic interventions to increase complexity of exercises. TREATMENT:   (In addition to Assessment/Re-Assessment sessions the following treatments were rendered)  Pre-treatment Symptoms/Complaints: Pt reports increased back, hip and leg soreness since beginning PT. He states he took 2 Aleve prior to PT today. Pain: Initial:     8/10 Post Session:  6/10     THERAPEUTIC EXERCISE: (60 minutes):  Exercises per grid below to improve mobility, strength and coordination. Required moderate visual, verbal and manual cues to promote proper body alignment, promote proper body posture and promote proper body mechanics. Progressed resistance, range and repetitions as indicated. Date:  8/9/17 Date:  8/11/17 Date:  8/16/17 Date:  8/18/17   Activity/Exercise Parameters Parameters Parameters Parameters   Education  Breathing, decreasing muscle guarding  HEP 2-3x/daily,Log-rolling, breathing out while tightening muscles and in when relaxing Breathing pattern with all exercises  HEP/deliberate movement with exercises.    LTR X 5 B  12x B  5 x 5 sec  10 x B   nustep  Next visit  0 resist/5min 1.2 resist/ 8 min  2.5 resist/10min   TAs  15 x 5 sec 10 x 5 Omitted today   Supine add sq w/ TAs  15 x 5 sec 10 x 5  15 x 5sec   Supine TrA with mini march    X 5 B (alternating) Omitted today   Supine hip ABD w/ TAs  15 x 5sec Not today RTB, 15x 5sec   SKTC  10 x 10sec B 3 x 15 sec with 5 reps of knee extension during last  3 x 20sec w/ 10 reps of knee ext during last str   HS/GS str    2 x 20sec w/ strap w/ A   Anterior/posterior hip stretching  Next visit   Figure 4 with manual IR/ER hip OP 3 x 15 sec left  L IR/ER w/ OP; 3 x 15sec   bridge Next visit  Next visit X 2 difficulty did not add to HEP, but will continue to attempt  3x throughout treatment for positioning -painful   multif (opp heel and elbow into mat)--supine  Next visit  Next visit 5 sec x 5  5 sec x 7   Long axis distraction L LE    3min     Modalities: Ice pack to L-spine x 10min w/ pt in supine w/legs over bolster. Treatment/Session Assessment: Pt able to tolerate longer on the Nu-Step, as well as, increased repetitions of several exercises noted above indicating improving endurance and exercise tolerance. He demonstrated some L LE dural tension symptoms with assisted HS/GS stretching with strap. Pt is currently progressing very slowly toward current goals due to pain and deconditioning. · Response to Treatment: Pt subjectively reported L groin pain several times throughout treatment when in hooklying, however, he is unable to lie flat due to severe LBP. He c/o increased fatigue at end of treatment. · Recommendations/Intent for next treatment session: Continue to progress stretching and strengthening exercises to aide with improving pt's mobility while decreasing pain and tightness. Pt may also benefit from manual joint mobilizations and/or IFES to further improve joint and tissue mobility.      Future Appointments  Date Time Provider Jonas Dneton   8/21/2017 11:00 AM Krystin Blank, PT [facility]   8/23/2017 11:00 AM German Snowden Rony, PT [facility] [place] [facility]   8/25/2017 11:00 AM Radha Dalton, PT [facility]   8/28/2017 11:00 AM Radha Dalton, PT [facility]   8/30/2017 11:00 AM Radah Dalton, PT [facility]   9/1/2017 10:00 AM Melba Lyman, PT [facility]   9/6/2017 11:00 AM Radha Dalton, PT [facility]   9/8/2017 10:00 AM Melba Lyman, PT [facility]   9/11/2017 11:00 AM Radha Dalton, PT [facility]   9/13/2017 10:00 AM Radha Dalton, PT [facility]   9/15/2017 11:00 AM Radha Dalton, PT [facility]   10/9/2017 12:30 PM MD Jake Aguilaryntie    10/11/2017 3:00 PM Nisa Horner MD [facility]   10/23/2017 10:00 AM Melba Lyman, PT [facility]       Total Treatment Duration:  PT Patient Time In/Time Out  Time In: 1005  Time Out: [address], PT

## 2017-08-21 ENCOUNTER — HOSPITAL ENCOUNTER (OUTPATIENT)
Dept: PHYSICAL THERAPY | Age: 57
Discharge: HOME | End: 2017-08-21
Payer: COMMERCIAL

## 2017-08-21 PROCEDURE — 97110 THERAPEUTIC EXERCISES: CPT

## 2017-08-21 NOTE — PROGRESS NOTES
Alene Dakin  : 1960 [id number] at [place] [address] [address] [place] [address], [facility], [address] [place] [address]  [phone]   [phone]         OUTPATIENT PHYSICAL THERAPY:Daily Note 2017    ICD-10: Treatment Diagnosis: low back pain (M54.5)  Precautions/Allergies:   Review of patient's allergies indicates no known allergies. Fall Risk Score: 2 (? 5 = High Risk)  MD Orders: evaluate and treat MEDICAL/REFERRING DIAGNOSIS:  Low back pain [M54.5]   DATE OF ONSET: chronic   REFERRING PHYSICIAN: Louise Parnell MD  RETURN PHYSICIAN APPOINTMENT: 10/9/17     INITIAL ASSESSMENT:  Mr. Colin Alas presents significant functional limitations due to back pain. PT evaluation reveals significant weakness of core and LE musculature, decreased functional AROM lumbar and B hip joints, altered spinal alignment, and poor pain management. Pt would highly benefit from skilled PT to address problems below and improve overall functional mobility. PROBLEM LIST (Impacting functional limitations):  1. Decreased Strength  2. Decreased ADL/Functional Activities  3. Increased Pain  4. Decreased Flexibility/Joint Mobility  5. Decreased Kings with Home Exercise Program INTERVENTIONS PLANNED:  1. Cold  2. Heat  3. Home Exercise Program (HEP)  4. Manual Therapy  5. Neuromuscular Re-education/Strengthening  6. Range of Motion (ROM)  7. Therapeutic Exercise/Strengthening   TREATMENT PLAN:  Effective Dates: 17 TO 10/9/17. Frequency/Duration: 3 times a week for 6 weeks  GOALS: (Goals have been discussed and agreed upon with patient.)  SHORT-TERM FUNCTIONAL GOALS: Time Frame: 2-3 weeks   1. Pt will be independent with HEP focusing on core stability and promoting good spinal alignment. 2. Pt will report no symptoms of LE for 1-2 weeks demonstrating centralization of symptoms. 3. Pt will report pain level does not exceed 5/10 in a 1-2 week period of time to demonstrate pain management.   4. Pt will report improved sleep with less disturbance from back pain. DISCHARGE GOALS: Time Frame: 4-6 weeks   1. Pt will improve Oswestry score by at least 10 points. 2. Pt will demonstrate increased AROM of lumbar spine all planes ~ 25% or greater without report of pain to improve functional mobility. 3. Pt will be able to sustain regular exercise routine including aerobic exercise 3+ times per week without increased symptoms to encourage healthy lifestyle. 4. Pt will demonstrate increased B LE strength to at least 4+/5 B to aid with functional mobility. Rehabilitation Potential For Stated Goals: Good  Regarding Gary Hammond's therapy, I certify that the treatment plan above will be carried out by a therapist or under their direction. Thank you for this referral,  William Noriega, PT               The information in this section was collected on 8/7/17 (except where otherwise noted). HISTORY:   History of Present Injury/Illness (Reason for Referral):  Pt reports several years low back pain with a severe episode after lifting heavy objects in 2013 causing left low back L LE pain. Pt states that he has been receiving chiropractic treatments since then for pain management however, pain continued to bother him especially during/after work shifts at [employer]. He states that he is on his feet on concrete all day long and recently back pain interfering with job activities. In 3 months ago, pt reports he called off work due to pain too severe to perform work activities. Went to MD, imaging showing disc protrusion at L3 and stenosis at L1-2,  L5-S1. Referred to neuro specialist. Epidural injection ordered (not scheduled yet). PT referral with Neuro follow up in 4 weeks. Present symptoms (on day of initial evaluation): constant low back aching, mid to low back.   Occasional numbness of B LE (R>L)      · Aggravating factors: standing on hard surfaces 5 min, walking, stairs ambulation, bending   · Relieving factors: recliner, pain medication     · Pain level: 6/10 presently, 9/10 worst, 5/10 best     Past Medical History/Comorbidities:   Mr. Colin Alas  has a past medical history of Allergic rhinitis due to allergen; Arthritis; Colon polyps (8/12/2016); Deficiency, lipoprotein (8/12/2016); Diabetes mellitus type 2, controlled ([facility] [address] (8/12/2016); Encounter for long-term (current) use of medications (8/12/2016); HLD (hyperlipidemia) (8/12/2016); HTN (hypertension) (8/12/2016); Obesity (8/12/2016); Other abnormal glucose (8/12/2016); Sinusitis, acute maxillary (8/12/2016); and URI (upper respiratory infection) (8/12/2016). Mr. Colin Alas  has a past surgical history that includes tonsillectomy; adenoidectomy; and other surgical (2005). Social History/Living Environment:     lives with spouse   Prior Level of Function/Work/Activity:  Currently on disability   Dominant Side:         RIGHT  Other Clinical Tests:          MRI lumbar spine: (copied from radiology)   Disc desiccation and mild degenerative changes and facet arthropathy  throughout the lumbar spine. There is a significant central stenosis at L1-2;  the thecal sac measures 6 to 7 mm in AP diameter. At L4-3-4 there is a  broad-based left lateral disc protrusion impinging the enlarged L3 nerve root. Moderate foraminal stenoses at L5-S1  Previous Treatment Approaches:          Chiropractor   Current Medications:       Current Outpatient Prescriptions:     multivitamin (ONE A DAY) tablet, Take 1 Tab by mouth daily. , Disp: , Rfl:     Insulin Needles, Disposable, (NOVOFINE 32) 32 gauge x 1/4\" ndle, 1 misc daily. For Victoza pen, Disp: 100 Pen Needle, Rfl: 3    traMADol (ULTRAM) 50 mg tablet, Take 1 Tab by mouth every six (6) hours as needed for Pain. Max Daily Amount: 200 mg. Indications: Pain, Disp: 120 Tab, Rfl: 2    metoprolol succinate (TOPROL-XL) 100 mg tablet, Take 1 Tab by mouth daily. , Disp: 90 Tab, Rfl: 3    metroNIDAZOLE (METROGEL) 1 % topical gel, Apply 1 g to affected area daily. Use a thin layer to affected areas after washing, Disp: 60 g, Rfl: 5    lansoprazole (PREVACID) 30 mg capsule, Take 1 Cap by mouth Daily (before breakfast). , Disp: 90 Cap, Rfl: 3    lisinopril-hydroCHLOROthiazide (PRINZIDE, ZESTORETIC) 20-12.5 mg per tablet, Take 1 Tab by mouth daily. , Disp: 90 Tab, Rfl: 3    simvastatin (ZOCOR) 40 mg tablet, Take 1 Tab by mouth daily. , Disp: 90 Tab, Rfl: 3    Liraglutide (VICTOZA) 0.6 mg/0.1 mL (18 mg/3 mL) sub-q pen, 19mg/3ml pen         1.8mg per day  Indications: type 2 diabetes mellitus, Disp: 9 Syringe, Rfl: 3    doxycycline (ADOXA) 100 mg tablet, Take 1 Tab by mouth two (2) times a day. Indications: ACNE ROSACEA, Disp: 100 Tab, Rfl: 2    PEN NEEDLE, DIABETIC (NOVOFINE 32), 32 g by Does Not Apply route daily.  Indications: 32G x 6 MM,, Disp: , Rfl:    Date Last Reviewed:  8/21/2017     EXAMINATION:   Observation/Orthostatic Postural Assessment:          Standing:  · Left scap elevated   · B hips in slight flexion   · Decreased lumbar lordotic curve  · Left lumbar shift   Palpation:         Tender of mid lumbar region   ROM:            Lumbar spine Date:  8/9/17 Date:   Date:     Direction  Parameters Parameters Parameters   Flexion  25% cs     Extension  25%     Rotation  R: 25%  L: 15%     Side bending  R: 50%  L: 25% cs      Hip IR Very limited B      Hip ER Mild limitation B cs     Hip flexion  R: 90 degrees  L: 100 degrees cs     Cs= comparable sign (symptoms provoked)    Strength:            Lower quadrant    DATE  8/9/17 DATE     Hip flexion R: 4-  L: 4- R:   L:    Hip Abduction  R: 4-  L: 4- R:   L:    Hip Extension  R: --  L: -- R:   L:    Knee Flexion  R: 4  L: 4- R:   L:    Knee Extension  R: 4+  L: 4- R:   L:    Ankle Dorsiflexion  R: 4   L: 4 R:   L:   Ankle Plantarflexion  R: 4  L:4  R:  L:          Special Tests:          None tested   Neurological Screen:        Myotomes: weakness generalized, not specific to lumbar nerve roots Sensation: intact B LE         Reflexes: will test next visit   Functional Mobility:         Gait/Ambulation:  WBOS, ER B hips, increased lateral sway. Transfers:  Use of B UE sit to stand         Stairs:  Not tested    CLINICAL DECISION MAKING:   Outcome Measure: Tool Used: Modified Oswestry Low Back Pain Questionnaire  Score:  Initial: 38/50  Most Recent: X/50 (Date: -- )   Interpretation of Score: Each section is scored on a 0-5 scale, 5 representing the greatest disability. The scores of each section are added together for a total score of 50. Medical Necessity:   · Patient is expected to demonstrate progress in strength and range of motion to decrease assistance required with ADLs as well as work related activities. .  Reason for Services/Other Comments:  · Patient continues to require modification of therapeutic interventions to increase complexity of exercises. TREATMENT:   (In addition to Assessment/Re-Assessment sessions the following treatments were rendered)  Pre-treatment Symptoms/Complaints: Pt states that he is less sore than last visit. Prolonged standing continues to increased pain most.       Pain: Initial:     5-6/10 Post Session:  4-5/10     THERAPEUTIC EXERCISE: (50 minutes):  Exercises per grid below to improve mobility, strength and coordination. Required moderate visual, verbal and manual cues to promote proper body alignment, promote proper body posture and promote proper body mechanics. Progressed resistance, range and repetitions as indicated. Date:  8/9/17 Date:  8/11/17 Date:  8/16/17 Date:  8/18/17 8/21/17   Activity/Exercise Parameters Parameters Parameters Parameters    Education  Breathing, decreasing muscle guarding  HEP 2-3x/daily,Log-rolling, breathing out while tightening muscles and in when relaxing Breathing pattern with all exercises  HEP/deliberate movement with exercises.  TrA isolation    LTR X 5 B  12x B  5 x 5 sec  10 x B 5 x 10 sec holds nustep  Next visit  0 resist/5min 1.2 resist/ 8 min  2.5 resist/10min X 13 min level 3    TAs  15 x 5 sec 10 x 5 Omitted today Refined today, 10 min of cuing and attempts until successful 5 reps, 5 sec holds,    Supine add sq w/ TAs  15 x 5 sec 10 x 5  15 x 5sec    Supine TrA with mini march    X 5 B (alternating) Omitted today    Supine hip ABD w/ TAs  15 x 5sec Not today RTB, 15x 5sec    SKTC  10 x 10sec B 3 x 15 sec with 5 reps of knee extension during last  3 x 20sec w/ 10 reps of knee ext during last str    HS/GS str    2 x 20sec w/ strap w/ A 2 x 20 sec with strap    Anterior/posterior hip stretching  Next visit   Figure 4 with manual IR/ER hip OP 3 x 15 sec left  L IR/ER w/ OP; 3 x 15sec Figure 4     bridge Next visit  Next visit X 2 difficulty did not add to HEP, but will continue to attempt  3x throughout treatment for positioning -painful X 5, 5 sec    multif (opp heel and elbow into mat)--supine  Next visit  Next visit 5 sec x 5  5 sec x 7 5 sec x 5 cuing for decreased force    Long axis distraction L LE    3min      Modalities: Ice pack to L-spine x 10min w/ pt in supine w/legs over bolster. Treatment/Session Assessment: Pt continues to tolerate longer on the Nu-Step, as well as, increased repetitions of several exercises. Did need extensive cuing to isolate TrA. Did not need assist with hamstring stretching with strap. Pt is currently progressing slowly toward current goals due to pain and deconditioning. · Response to Treatment: mild intermittent pain of groin region B during exercises . · Recommendations/Intent for next treatment session: Continue to progress stretching and strengthening exercises.       Future Appointments  Date Time Provider Jonas Denton   8/23/2017 11:00 AM Krystin Blank PT [facility]   8/25/2017 11:00 AM Krystin Blank PT [facility]   8/28/2017 11:00 AM Krystin Blank PT [facility]   8/30/2017 11:00 AM Krystin Blank PT [facility]   9/1/2017 10:00 AM Ranjeet Mckeon, PT [facility]   9/6/2017 11:00 AM Corrales File, PT [facility]   9/8/2017 10:00 AM Ranjeet Mckeon, PT [facility]   9/11/2017 11:00 AM Corrales File, PT [facility]   9/13/2017 10:00 AM Corrales File, PT [facility]   9/15/2017 11:00 AM Corrales File, PT [facility]   10/9/2017 12:30 PM MD [address]   10/11/2017 3:00 PM Ricardo Paredes MD [facility]   10/23/2017 10:00 AM Ranjeet Mckeon, PT [facility]       Total Treatment Duration:  PT Patient Time In/Time Out  Time In: 1105  Time Out: [address], PT

## 2017-08-23 ENCOUNTER — HOSPITAL ENCOUNTER (OUTPATIENT)
Dept: PHYSICAL THERAPY | Age: 57
Discharge: HOME | End: 2017-08-23
Payer: COMMERCIAL

## 2017-08-23 PROCEDURE — 97110 THERAPEUTIC EXERCISES: CPT

## 2017-08-23 NOTE — PROGRESS NOTES
Beatriz Venegas  : 1960 [id number] at [place] [address] [address] [place] [address], [facility], [address] [place] [address]  [phone]   [phone]         OUTPATIENT PHYSICAL THERAPY:Daily Note 2017    ICD-10: Treatment Diagnosis: low back pain (M54.5)  Precautions/Allergies:   Review of patient's allergies indicates no known allergies. Fall Risk Score: 2 (? 5 = High Risk)  MD Orders: evaluate and treat MEDICAL/REFERRING DIAGNOSIS:  Low back pain [M54.5]   DATE OF ONSET: chronic   REFERRING PHYSICIAN: Merlin Robledo MD  RETURN PHYSICIAN APPOINTMENT: 10/9/17     INITIAL ASSESSMENT:  Mr. Izabela Null presents significant functional limitations due to back pain. PT evaluation reveals significant weakness of core and LE musculature, decreased functional AROM lumbar and B hip joints, altered spinal alignment, and poor pain management. Pt would highly benefit from skilled PT to address problems below and improve overall functional mobility. PROBLEM LIST (Impacting functional limitations):  1. Decreased Strength  2. Decreased ADL/Functional Activities  3. Increased Pain  4. Decreased Flexibility/Joint Mobility  5. Decreased [place] with Home Exercise Program INTERVENTIONS PLANNED:  1. Cold  2. Heat  3. Home Exercise Program (HEP)  4. Manual Therapy  5. Neuromuscular Re-education/Strengthening  6. Range of Motion (ROM)  7. Therapeutic Exercise/Strengthening   TREATMENT PLAN:  Effective Dates: 17 TO 10/9/17. Frequency/Duration: 3 times a week for 6 weeks  GOALS: (Goals have been discussed and agreed upon with patient.)  SHORT-TERM FUNCTIONAL GOALS: Time Frame: 2-3 weeks   1. Pt will be independent with HEP focusing on core stability and promoting good spinal alignment. 2. Pt will report no symptoms of LE for 1-2 weeks demonstrating centralization of symptoms. 3. Pt will report pain level does not exceed 5/10 in a 1-2 week period of time to demonstrate pain management.   4. Pt will report improved sleep with less disturbance from back pain. DISCHARGE GOALS: Time Frame: 4-6 weeks   1. Pt will improve Oswestry score by at least 10 points. 2. Pt will demonstrate increased AROM of lumbar spine all planes ~ 25% or greater without report of pain to improve functional mobility. 3. Pt will be able to sustain regular exercise routine including aerobic exercise 3+ times per week without increased symptoms to encourage healthy lifestyle. 4. Pt will demonstrate increased B LE strength to at least 4+/5 B to aid with functional mobility. Rehabilitation Potential For Stated Goals: Good  Regarding Danish Hammond's therapy, I certify that the treatment plan above will be carried out by a therapist or under their direction. Thank you for this referral,  James Lainez, PT               The information in this section was collected on 8/7/17 (except where otherwise noted). HISTORY:   History of Present Injury/Illness (Reason for Referral):  Pt reports several years low back pain with a severe episode after lifting heavy objects in 2013 causing left low back L LE pain. Pt states that he has been receiving chiropractic treatments since then for pain management however, pain continued to bother him especially during/after work shifts at [employer]. He states that he is on his feet on concrete all day long and recently back pain interfering with job activities. In 3 months ago, pt reports he called off work due to pain too severe to perform work activities. Went to MD, imaging showing disc protrusion at L3 and stenosis at L1-2,  L5-S1. Referred to neuro specialist. Epidural injection ordered (not scheduled yet). PT referral with Neuro follow up in 4 weeks. Present symptoms (on day of initial evaluation): constant low back aching, mid to low back.   Occasional numbness of B LE (R>L)      · Aggravating factors: standing on hard surfaces 5 min, walking, stairs ambulation, bending   · Relieving factors: recliner, pain medication     · Pain level: 6/10 presently, 9/10 worst, 5/10 best     Past Medical History/Comorbidities:   Mr. Jessica Leon  has a past medical history of Allergic rhinitis due to allergen; Arthritis; Colon polyps (8/12/2016); Deficiency, lipoprotein (8/12/2016); Diabetes mellitus type 2, controlled ([facility] [address] (8/12/2016); Encounter for long-term (current) use of medications (8/12/2016); HLD (hyperlipidemia) (8/12/2016); HTN (hypertension) (8/12/2016); Obesity (8/12/2016); Other abnormal glucose (8/12/2016); Sinusitis, acute maxillary (8/12/2016); and URI (upper respiratory infection) (8/12/2016). Mr. Jessica Leon  has a past surgical history that includes tonsillectomy; adenoidectomy; and other surgical (2005). Social History/Living Environment:     lives with spouse   Prior Level of Function/Work/Activity:  Currently on disability   Dominant Side:         RIGHT  Other Clinical Tests:          MRI lumbar spine: (copied from radiology)   Disc desiccation and mild degenerative changes and facet arthropathy  throughout the lumbar spine. There is a significant central stenosis at L1-2;  the thecal sac measures 6 to 7 mm in AP diameter. At L4-3-4 there is a  broad-based left lateral disc protrusion impinging the enlarged L3 nerve root. Moderate foraminal stenoses at L5-S1  Previous Treatment Approaches:          Chiropractor   Current Medications:       Current Outpatient Prescriptions:     multivitamin (ONE A DAY) tablet, Take 1 Tab by mouth daily. , Disp: , Rfl:     Insulin Needles, Disposable, (NOVOFINE 32) 32 gauge x 1/4\" ndle, 1 misc daily. For Victoza pen, Disp: 100 Pen Needle, Rfl: 3    traMADol (ULTRAM) 50 mg tablet, Take 1 Tab by mouth every six (6) hours as needed for Pain. Max Daily Amount: 200 mg. Indications: Pain, Disp: 120 Tab, Rfl: 2    metoprolol succinate (TOPROL-XL) 100 mg tablet, Take 1 Tab by mouth daily. , Disp: 90 Tab, Rfl: 3    metroNIDAZOLE (METROGEL) 1 % topical gel, Apply 1 g to affected area daily. Use a thin layer to affected areas after washing, Disp: 60 g, Rfl: 5    lansoprazole (PREVACID) 30 mg capsule, Take 1 Cap by mouth Daily (before breakfast). , Disp: 90 Cap, Rfl: 3    lisinopril-hydroCHLOROthiazide (PRINZIDE, ZESTORETIC) 20-12.5 mg per tablet, Take 1 Tab by mouth daily. , Disp: 90 Tab, Rfl: 3    simvastatin (ZOCOR) 40 mg tablet, Take 1 Tab by mouth daily. , Disp: 90 Tab, Rfl: 3    Liraglutide (VICTOZA) 0.6 mg/0.1 mL (18 mg/3 mL) sub-q pen, 19mg/3ml pen         1.8mg per day  Indications: type 2 diabetes mellitus, Disp: 9 Syringe, Rfl: 3    doxycycline (ADOXA) 100 mg tablet, Take 1 Tab by mouth two (2) times a day. Indications: ACNE ROSACEA, Disp: 100 Tab, Rfl: 2    PEN NEEDLE, DIABETIC (NOVOFINE 32), 32 g by Does Not Apply route daily.  Indications: 32G x 6 MM,, Disp: , Rfl:    Date Last Reviewed:  8/23/2017     EXAMINATION:   Observation/Orthostatic Postural Assessment:          Standing:  · Left scap elevated   · B hips in slight flexion   · Decreased lumbar lordotic curve  · Left lumbar shift   Palpation:         Tender of mid lumbar region   ROM:            Lumbar spine Date:  8/9/17 Date:   Date:     Direction  Parameters Parameters Parameters   Flexion  25% cs     Extension  25%     Rotation  R: 25%  L: 15%     Side bending  R: 50%  L: 25% cs      Hip IR Very limited B      Hip ER Mild limitation B cs     Hip flexion  R: 90 degrees  L: 100 degrees cs     Cs= comparable sign (symptoms provoked)    Strength:            Lower quadrant    DATE  8/9/17 DATE     Hip flexion R: 4-  L: 4- R:   L:    Hip Abduction  R: 4-  L: 4- R:   L:    Hip Extension  R: --  L: -- R:   L:    Knee Flexion  R: 4  L: 4- R:   L:    Knee Extension  R: 4+  L: 4- R:   L:    Ankle Dorsiflexion  R: 4   L: 4 R:   L:   Ankle Plantarflexion  R: 4  L:4  R:  L:          Special Tests:          None tested   Neurological Screen:        Myotomes: weakness generalized, not specific to lumbar nerve roots Sensation: intact B LE         Reflexes: will test next visit   Functional Mobility:         Gait/Ambulation:  WBOS, ER B hips, increased lateral sway. Transfers:  Use of B UE sit to stand         Stairs:  Not tested    CLINICAL DECISION MAKING:   Outcome Measure: Tool Used: Modified Oswestry Low Back Pain Questionnaire  Score:  Initial: 38/50  Most Recent: X/50 (Date: -- )   Interpretation of Score: Each section is scored on a 0-5 scale, 5 representing the greatest disability. The scores of each section are added together for a total score of 50. Medical Necessity:   · Patient is expected to demonstrate progress in strength and range of motion to decrease assistance required with ADLs as well as work related activities. .  Reason for Services/Other Comments:  · Patient continues to require modification of therapeutic interventions to increase complexity of exercises. TREATMENT:   (In addition to Assessment/Re-Assessment sessions the following treatments were rendered)  Pre-treatment Symptoms/Complaints: pt states the he has been experiencing significant right groin pain intermittently that increases with coughing abdominal tension. He states that it is less currently but did not do his   ---went to chiro yesterday and very sore since. Pain: Initial:     7/10 Post Session:  6/10     THERAPEUTIC EXERCISE: (55 minutes):  Exercises per grid below to improve mobility, strength and coordination. Required moderate visual, verbal and manual cues to promote proper body alignment, promote proper body posture and promote proper body mechanics. Progressed resistance, range and repetitions as indicated.    Date:  8/9/17 Date:  8/11/17 Date:  8/16/17 Date:  8/18/17 8/21/17 8/23/17   Activity/Exercise Parameters Parameters Parameters Parameters     Education  Breathing, decreasing muscle guarding  HEP 2-3x/daily,Log-rolling, breathing out while tightening muscles and in when relaxing Breathing pattern with all exercises  HEP/deliberate movement with exercises. TrA isolation     LTR X 5 B  12x B  5 x 5 sec  10 x B 5 x 10 sec holds  5 x 10 sec holds   nustep  Next visit  0 resist/5min 1.2 resist/ 8 min  2.5 resist/10min X 13 min level 3  X 10 min level 3    TAs  15 x 5 sec 10 x 5 Omitted today Refined today, 10 min of cuing and attempts until successful 5 reps, 5 sec holds,  Used stabilizer x 25 min focused on TrA iso and sustained hold with no compensatory muscle activation      Supine add sq w/ TAs  15 x 5 sec 10 x 5  15 x 5sec     Supine TrA with mini march    X 5 B (alternating) Omitted today     Supine hip ABD w/ TAs  15 x 5sec Not today RTB, 15x 5sec     SKTC  10 x 10sec B 3 x 15 sec with 5 reps of knee extension during last  3 x 20sec w/ 10 reps of knee ext during last str     HS/GS str    2 x 20sec w/ strap w/ A 2 x 20 sec with strap     Anterior/posterior hip stretching  Next visit   Figure 4 with manual IR/ER hip OP 3 x 15 sec left  L IR/ER w/ OP; 3 x 15sec Figure 4      bridge Next visit  Next visit X 2 difficulty did not add to HEP, but will continue to attempt  3x throughout treatment for positioning -painful X 5, 5 sec     multif (opp heel and elbow into mat)--supine  Next visit  Next visit 5 sec x 5  5 sec x 7 5 sec x 5 cuing for decreased force     Long axis distraction L LE    3min     Sit to stand       With TrA contraction x 10     Modalities: none today. Treatment/Session Assessment: very difficult for pt to isolate and sustain TrA contraction. Encouraged pt to attempt at home consistently in different positions to decrease stress on LB. · Response to Treatment: pt voiced understanding of TrA purpose and HEP. No increased pain at end of session . · Recommendations/Intent for next treatment session: Continue to progress stretching and strengthening exercises.       Future Appointments  Date Time Provider Jonas [place]   8/25/2017 11:00 AM Shady Cline, PT [facility]   8/28/2017 11:00 AM Wilmer Morales, PT [facility]   8/30/2017 11:00 AM Wilmer Morales, PT [facility]   9/1/2017 10:00 AM Christiana Lyudmila, PT [facility]   9/6/2017 11:00 AM Wilmer Morales, PT [facility]   9/8/2017 10:00 AM Christiana Lyudmila, PT [facility]   9/11/2017 11:00 AM Wilmer Morales, PT [facility]   9/13/2017 10:00 AM Wilmer Morales, PT [facility]   9/15/2017 11:00 AM Wilmer Morales, PT [facility]   10/9/2017 12:30 PM Adonis Goodman MD [facility]   10/11/2017 3:00 PM MD [facility] Naran[facility]   10/23/2017 10:00 AM Christiana Coreas, PT [facility]   2/20/2018 8:10 AM [facility]   2/23/2018 3:30 PM MD LUCILA Naranjo       Total Treatment Duration:  PT Patient Time In/Time Out  Time In: 1100  Time Out: [address], PT

## 2017-08-25 ENCOUNTER — HOSPITAL ENCOUNTER (OUTPATIENT)
Dept: PHYSICAL THERAPY | Age: 57
Discharge: HOME | End: 2017-08-25
Payer: COMMERCIAL

## 2017-08-25 PROCEDURE — 97110 THERAPEUTIC EXERCISES: CPT

## 2017-08-25 NOTE — PROGRESS NOTES
Pablo Brown  : 1960 [address] at [place] [address] [address] [place] [address], [facility], [address] [place] [address]  [phone]   [phone]         OUTPATIENT PHYSICAL THERAPY:Daily Note 2017    ICD-10: Treatment Diagnosis: low back pain (M54.5)  Precautions/Allergies:   Review of patient's allergies indicates no known allergies. Fall Risk Score: 2 (? 5 = High Risk)  MD Orders: evaluate and treat MEDICAL/REFERRING DIAGNOSIS:  Low back pain [M54.5]   DATE OF ONSET: chronic   REFERRING PHYSICIAN: Rosario Reeder MD  RETURN PHYSICIAN APPOINTMENT: 10/9/17     INITIAL ASSESSMENT:  Mr. Rosy Deal presents significant functional limitations due to back pain. PT evaluation reveals significant weakness of core and LE musculature, decreased functional AROM lumbar and B hip joints, altered spinal alignment, and poor pain management. Pt would highly benefit from skilled PT to address problems below and improve overall functional mobility. PROBLEM LIST (Impacting functional limitations):  1. Decreased Strength  2. Decreased ADL/Functional Activities  3. Increased Pain  4. Decreased Flexibility/Joint Mobility  5. Decreased [place] with Home Exercise Program INTERVENTIONS PLANNED:  1. Cold  2. Heat  3. Home Exercise Program (HEP)  4. Manual Therapy  5. Neuromuscular Re-education/Strengthening  6. Range of Motion (ROM)  7. Therapeutic Exercise/Strengthening   TREATMENT PLAN:  Effective Dates: 17 TO 10/9/17. Frequency/Duration: 3 times a week for 6 weeks  GOALS: (Goals have been discussed and agreed upon with patient.)  SHORT-TERM FUNCTIONAL GOALS: Time Frame: 2-3 weeks   1. Pt will be independent with HEP focusing on core stability and promoting good spinal alignment. 2. Pt will report no symptoms of LE for 1-2 weeks demonstrating centralization of symptoms. 3. Pt will report pain level does not exceed 5/10 in a 1-2 week period of time to demonstrate pain management.   4. Pt will report improved sleep with less disturbance from back pain. DISCHARGE GOALS: Time Frame: 4-6 weeks   1. Pt will improve Oswestry score by at least 10 points. 2. Pt will demonstrate increased AROM of lumbar spine all planes ~ 25% or greater without report of pain to improve functional mobility. 3. Pt will be able to sustain regular exercise routine including aerobic exercise 3+ times per week without increased symptoms to encourage healthy lifestyle. 4. Pt will demonstrate increased B LE strength to at least 4+/5 B to aid with functional mobility. Rehabilitation Potential For Stated Goals: Good  Regarding Brit Hammond's therapy, I certify that the treatment plan above will be carried out by a therapist or under their direction. Thank you for this referral,  Tracee Little, PT               The information in this section was collected on 8/7/17 (except where otherwise noted). HISTORY:   History of Present Injury/Illness (Reason for Referral):  Pt reports several years low back pain with a severe episode after lifting heavy objects in 2013 causing left low back L LE pain. Pt states that he has been receiving chiropractic treatments since then for pain management however, pain continued to bother him especially during/after work shifts at [employer]. He states that he is on his feet on concrete all day long and recently back pain interfering with job activities. In 3 months ago, pt reports he called off work due to pain too severe to perform work activities. Went to MD, imaging showing disc protrusion at L3 and stenosis at L1-2,  L5-S1. Referred to neuro specialist. Epidural injection ordered (not scheduled yet). PT referral with Neuro follow up in 4 weeks. Present symptoms (on day of initial evaluation): constant low back aching, mid to low back.   Occasional numbness of B LE (R>L)      · Aggravating factors: standing on hard surfaces 5 min, walking, stairs ambulation, bending   · Relieving factors: recliner, pain medication     · Pain level: 6/10 presently, 9/10 worst, 5/10 best     Past Medical History/Comorbidities:   Mr. Mario Garcia  has a past medical history of Allergic rhinitis due to allergen; Arthritis; Colon polyps (8/12/2016); Deficiency, lipoprotein (8/12/2016); Diabetes mellitus type 2, controlled ([facility] [address] (8/12/2016); Encounter for long-term (current) use of medications (8/12/2016); HLD (hyperlipidemia) (8/12/2016); HTN (hypertension) (8/12/2016); Obesity (8/12/2016); Other abnormal glucose (8/12/2016); Sinusitis, acute maxillary (8/12/2016); and URI (upper respiratory infection) (8/12/2016). Mr. Mario Garcia  has a past surgical history that includes tonsillectomy; adenoidectomy; and other surgical (2005). Social History/Living Environment:     lives with spouse   Prior Level of Function/Work/Activity:  Currently on disability   Dominant Side:         RIGHT  Other Clinical Tests:          MRI lumbar spine: (copied from radiology)   Disc desiccation and mild degenerative changes and facet arthropathy  throughout the lumbar spine. There is a significant central stenosis at L1-2;  the thecal sac measures 6 to 7 mm in AP diameter. At L4-3-4 there is a  broad-based left lateral disc protrusion impinging the enlarged L3 nerve root. Moderate foraminal stenoses at L5-S1  Previous Treatment Approaches:          Chiropractor   Current Medications:       Current Outpatient Prescriptions:     multivitamin (ONE A DAY) tablet, Take 1 Tab by mouth daily. , Disp: , Rfl:     Insulin Needles, Disposable, (NOVOFINE 32) 32 gauge x 1/4\" ndle, 1 misc daily. For Victoza pen, Disp: 100 Pen Needle, Rfl: 3    traMADol (ULTRAM) 50 mg tablet, Take 1 Tab by mouth every six (6) hours as needed for Pain. Max Daily Amount: 200 mg. Indications: Pain, Disp: 120 Tab, Rfl: 2    metoprolol succinate (TOPROL-XL) 100 mg tablet, Take 1 Tab by mouth daily. , Disp: 90 Tab, Rfl: 3    metroNIDAZOLE (METROGEL) 1 % topical gel, Apply 1 g to affected area daily. Use a thin layer to affected areas after washing, Disp: 60 g, Rfl: 5    lansoprazole (PREVACID) 30 mg capsule, Take 1 Cap by mouth Daily (before breakfast). , Disp: 90 Cap, Rfl: 3    lisinopril-hydroCHLOROthiazide (PRINZIDE, ZESTORETIC) 20-12.5 mg per tablet, Take 1 Tab by mouth daily. , Disp: 90 Tab, Rfl: 3    simvastatin (ZOCOR) 40 mg tablet, Take 1 Tab by mouth daily. , Disp: 90 Tab, Rfl: 3    Liraglutide (VICTOZA) 0.6 mg/0.1 mL (18 mg/3 mL) sub-q pen, 19mg/3ml pen         1.8mg per day  Indications: type 2 diabetes mellitus, Disp: 9 Syringe, Rfl: 3    doxycycline (ADOXA) 100 mg tablet, Take 1 Tab by mouth two (2) times a day. Indications: ACNE ROSACEA, Disp: 100 Tab, Rfl: 2    PEN NEEDLE, DIABETIC (NOVOFINE 32), 32 g by Does Not Apply route daily.  Indications: 32G x 6 MM,, Disp: , Rfl:    Date Last Reviewed:  8/25/2017     EXAMINATION:   Observation/Orthostatic Postural Assessment:          Standing:  · Left scap elevated   · B hips in slight flexion   · Decreased lumbar lordotic curve  · Left lumbar shift   Palpation:         Tender of mid lumbar region   ROM:            Lumbar spine Date:  8/9/17 Date:   Date:     Direction  Parameters Parameters Parameters   Flexion  25% cs     Extension  25%     Rotation  R: 25%  L: 15%     Side bending  R: 50%  L: 25% cs      Hip IR Very limited B      Hip ER Mild limitation B cs     Hip flexion  R: 90 degrees  L: 100 degrees cs     Cs= comparable sign (symptoms provoked)    Strength:            Lower quadrant    DATE  8/9/17 DATE     Hip flexion R: 4-  L: 4- R:   L:    Hip Abduction  R: 4-  L: 4- R:   L:    Hip Extension  R: --  L: -- R:   L:    Knee Flexion  R: 4  L: 4- R:   L:    Knee Extension  R: 4+  L: 4- R:   L:    Ankle Dorsiflexion  R: 4   L: 4 R:   L:   Ankle Plantarflexion  R: 4  L:4  R:  L:          Special Tests:          None tested   Neurological Screen:        Myotomes: weakness generalized, not specific to lumbar nerve roots Sensation: intact B LE         Reflexes: will test next visit   Functional Mobility:         Gait/Ambulation:  WBOS, ER B hips, increased lateral sway. Transfers:  Use of B UE sit to stand         Stairs:  Not tested    CLINICAL DECISION MAKING:   Outcome Measure: Tool Used: Modified Oswestry Low Back Pain Questionnaire  Score:  Initial: 38/50  Most Recent: X/50 (Date: -- )   Interpretation of Score: Each section is scored on a 0-5 scale, 5 representing the greatest disability. The scores of each section are added together for a total score of 50. Medical Necessity:   · Patient is expected to demonstrate progress in strength and range of motion to decrease assistance required with ADLs as well as work related activities. .  Reason for Services/Other Comments:  · Patient continues to require modification of therapeutic interventions to increase complexity of exercises. TREATMENT:   (In addition to Assessment/Re-Assessment sessions the following treatments were rendered)  Pre-treatment Symptoms/Complaints: pt reports he is doing pretty well today, less sore overall. Pain: Initial:     3/10 Post Session:  3/10     THERAPEUTIC EXERCISE: (55 minutes):  Exercises per grid below to improve mobility, strength and coordination. Required moderate visual, verbal and manual cues to promote proper body alignment, promote proper body posture and promote proper body mechanics. Progressed resistance, range and repetitions as indicated. Date:  8/9/17 Date:  8/11/17 Date:  8/16/17 Date:  8/18/17 8/21/17 8/23/17 8/25/17   Activity/Exercise Parameters Parameters Parameters Parameters      Education  Breathing, decreasing muscle guarding  HEP 2-3x/daily,Log-rolling, breathing out while tightening muscles and in when relaxing Breathing pattern with all exercises  HEP/deliberate movement with exercises.  TrA isolation      LTR X 5 B  12x B  5 x 5 sec  10 x B 5 x 10 sec holds  5 x 10 sec holds 5 x 10 sec holds    nustep  Next visit  0 resist/5min 1.2 resist/ 8 min  2.5 resist/10min X 13 min level 3  X 10 min level 3  X 11 min level 3   TAs  15 x 5 sec 10 x 5 Omitted today Refined today, 10 min of cuing and attempts until successful 5 reps, 5 sec holds,  Used stabilizer x 25 min focused on TrA iso and sustained hold with no compensatory muscle activation    Standing position    Supine add sq w/ TAs  15 x 5 sec 10 x 5  15 x 5sec   X 10,  5 sec    Supine TrA with mini march    X 5 B (alternating) Omitted today      Supine hip ABD w/ TAs  15 x 5sec Not today RTB, 15x 5sec   RTB 10, 5 sec   SKTC  10 x 10sec B 3 x 15 sec with 5 reps of knee extension during last  3 x 20sec w/ 10 reps of knee ext during last str      HS/GS str    2 x 20sec w/ strap w/ A 2 x 20 sec with strap      Anterior/posterior hip stretching  Next visit   Figure 4 with manual IR/ER hip OP 3 x 15 sec left  L IR/ER w/ OP; 3 x 15sec Figure 4       bridge Next visit  Next visit X 2 difficulty did not add to HEP, but will continue to attempt  3x throughout treatment for positioning -painful X 5, 5 sec   X 6, 5 sec holds (difficult)   multif (opp heel and elbow into mat)--supine  Next visit  Next visit 5 sec x 5  5 sec x 7 5 sec x 5 cuing for decreased force      Long axis distraction L LE    3min      Sit to stand       With TrA contraction x 10      Side stepping        2 laps in hallway   Hip ER/IR       Supine knee extended x 10 B                          Modalities: none today. Treatment/Session Assessment:  Encouraged pt to attempt TrA contraction at home consistently in different positions to decrease stress on LB. Hip external rotators are very tight B.   · Response to Treatment: pt voiced understanding of TrA purpose and HEP. No increased pain at end of session .    · Recommendations/Intent for next treatment session:       Future Appointments  Date Time Provider Jonas [place]   8/30/2017 11:00 AM Nereida Lopez, PT [facility] 9/1/2017 10:00 AM Maria C Never, PT [facility]   9/6/2017 11:00 AM Issac Wilmer, PT [facility]   9/8/2017 10:00 AM Maria C Never, PT [facility]   9/11/2017 11:00 AM Issac [place], PT [facility]   9/13/2017 10:00 AM Issac [place], PT [facility]   9/15/2017 11:00 AM Issac [place], PT [facility]   10/9/2017 12:30 PM Angelito Chaney MD [address]   10/11/2017 3:00 PM MD [facility]   10/23/2017 10:00 AM Maria C Never, PT [facility]   2/20/2018 8:10 AM [facility]   2/23/2018 3:30 PM MD LUCILA Chris       Total Treatment Duration:  PT Patient Time In/Time Out  Time In: 1100  Time Out: [address], PT

## 2017-08-28 ENCOUNTER — APPOINTMENT (OUTPATIENT)
Dept: PHYSICAL THERAPY | Age: 57
End: 2017-08-28
Payer: COMMERCIAL

## 2017-08-30 ENCOUNTER — HOSPITAL ENCOUNTER (OUTPATIENT)
Dept: PHYSICAL THERAPY | Age: 57
Discharge: HOME | End: 2017-08-30
Payer: COMMERCIAL

## 2017-08-30 ENCOUNTER — APPOINTMENT (OUTPATIENT)
Dept: PHYSICAL THERAPY | Age: 57
End: 2017-08-30
Payer: COMMERCIAL

## 2017-08-30 PROCEDURE — 97014 ELECTRIC STIMULATION THERAPY: CPT

## 2017-08-30 PROCEDURE — 97110 THERAPEUTIC EXERCISES: CPT

## 2017-08-30 PROCEDURE — 97140 MANUAL THERAPY 1/> REGIONS: CPT

## 2017-08-30 NOTE — PROGRESS NOTES
Tete Shows  : 1960 [address] at [place] [address] [address] [facility], [facility], [address] [place] [address]  [phone]   [phone]         OUTPATIENT PHYSICAL THERAPY:Daily Note 2017    ICD-10: Treatment Diagnosis: low back pain (M54.5)  Precautions/Allergies:   Review of patient's allergies indicates no known allergies. Fall Risk Score: 2 (? 5 = High Risk)  MD Orders: evaluate and treat MEDICAL/REFERRING DIAGNOSIS:  Low back pain [M54.5]   DATE OF ONSET: chronic   REFERRING PHYSICIAN: Estephania Doty MD  RETURN PHYSICIAN APPOINTMENT: 10/9/17     INITIAL ASSESSMENT:  Mr. Jessica Leon presents significant functional limitations due to back pain. PT evaluation reveals significant weakness of core and LE musculature, decreased functional AROM lumbar and B hip joints, altered spinal alignment, and poor pain management. Pt would highly benefit from skilled PT to address problems below and improve overall functional mobility. PROBLEM LIST (Impacting functional limitations):  1. Decreased Strength  2. Decreased ADL/Functional Activities  3. Increased Pain  4. Decreased Flexibility/Joint Mobility  5. Decreased [facility] with Home Exercise Program INTERVENTIONS PLANNED:  1. Cold  2. Heat  3. Home Exercise Program (HEP)  4. Manual Therapy  5. Neuromuscular Re-education/Strengthening  6. Range of Motion (ROM)  7. Therapeutic Exercise/Strengthening   TREATMENT PLAN:  Effective Dates: 17 TO 10/9/17. Frequency/Duration: 3 times a week for 6 weeks  GOALS: (Goals have been discussed and agreed upon with patient.)  SHORT-TERM FUNCTIONAL GOALS: Time Frame: 2-3 weeks   1. Pt will be independent with HEP focusing on core stability and promoting good spinal alignment. 2. Pt will report no symptoms of LE for 1-2 weeks demonstrating centralization of symptoms. 3. Pt will report pain level does not exceed 5/10 in a 1-2 week period of time to demonstrate pain management.   4. Pt will report improved sleep with less disturbance from back pain. DISCHARGE GOALS: Time Frame: 4-6 weeks   1. Pt will improve Oswestry score by at least 10 points. 2. Pt will demonstrate increased AROM of lumbar spine all planes ~ 25% or greater without report of pain to improve functional mobility. 3. Pt will be able to sustain regular exercise routine including aerobic exercise 3+ times per week without increased symptoms to encourage healthy lifestyle. 4. Pt will demonstrate increased B LE strength to at least 4+/5 B to aid with functional mobility. Rehabilitation Potential For Stated Goals: Good  Regarding Brigette Jatin Manciaks's therapy, I certify that the treatment plan above will be carried out by a therapist or under their direction. Thank you for this referral,  Nereida Lopez, PT               The information in this section was collected on 8/7/17 (except where otherwise noted). HISTORY:   History of Present Injury/Illness (Reason for Referral):  Pt reports several years low back pain with a severe episode after lifting heavy objects in 2013 causing left low back L LE pain. Pt states that he has been receiving chiropractic treatments since then for pain management however, pain continued to bother him especially during/after work shifts at [address] [place] [address] [employer]. He states that he is on his feet on concrete all day long and recently back pain interfering with job activities. In 3 months ago, pt reports he called off work due to pain too severe to perform work activities. Went to MD, imaging showing disc protrusion at L3 and stenosis at L1-2,  L5-S1. Referred to neuro specialist. Epidural injection ordered (not scheduled yet). PT referral with Neuro follow up in 4 weeks. Present symptoms (on day of initial evaluation): constant low back aching, mid to low back.   Occasional numbness of B LE (R>L)      · Aggravating factors: standing on hard surfaces 5 min, walking, stairs ambulation, bending   · Relieving factors: recliner, pain medication     · Pain level: 6/10 presently, 9/10 worst, 5/10 best     Past Medical History/Comorbidities:   Mr. Caleb Pack  has a past medical history of Allergic rhinitis due to allergen; Arthritis; Colon polyps (8/12/2016); Deficiency, lipoprotein (8/12/2016); Diabetes mellitus type 2, controlled ([facility] [address] (8/12/2016); Encounter for long-term (current) use of medications (8/12/2016); HLD (hyperlipidemia) (8/12/2016); HTN (hypertension) (8/12/2016); Obesity (8/12/2016); Other abnormal glucose (8/12/2016); Sinusitis, acute maxillary (8/12/2016); and URI (upper respiratory infection) (8/12/2016). Mr. Caleb Pack  has a past surgical history that includes tonsillectomy; adenoidectomy; and other surgical (2005). Social History/Living Environment:     lives with spouse   Prior Level of Function/Work/Activity:  Currently on disability   Dominant Side:         RIGHT  Other Clinical Tests:          MRI lumbar spine: (copied from radiology)   Disc desiccation and mild degenerative changes and facet arthropathy  throughout the lumbar spine. There is a significant central stenosis at L1-2;  the thecal sac measures 6 to 7 mm in AP diameter. At L4-3-4 there is a  broad-based left lateral disc protrusion impinging the enlarged L3 nerve root. Moderate foraminal stenoses at L5-S1  Previous Treatment Approaches:          Chiropractor   Current Medications:       Current Outpatient Prescriptions:     multivitamin (ONE A DAY) tablet, Take 1 Tab by mouth daily. , Disp: , Rfl:     Insulin Needles, Disposable, (NOVOFINE 32) 32 gauge x 1/4\" ndle, 1 misc daily. For Victoza pen, Disp: 100 Pen Needle, Rfl: 3    traMADol (ULTRAM) 50 mg tablet, Take 1 Tab by mouth every six (6) hours as needed for Pain. Max Daily Amount: 200 mg. Indications: Pain, Disp: 120 Tab, Rfl: 2    metoprolol succinate (TOPROL-XL) 100 mg tablet, Take 1 Tab by mouth daily. , Disp: 90 Tab, Rfl: 3    metroNIDAZOLE (METROGEL) 1 % topical gel, Apply 1 g to affected area daily. Use a thin layer to affected areas after washing, Disp: 60 g, Rfl: 5    lansoprazole (PREVACID) 30 mg capsule, Take 1 Cap by mouth Daily (before breakfast). , Disp: 90 Cap, Rfl: 3    lisinopril-hydroCHLOROthiazide (PRINZIDE, ZESTORETIC) 20-12.5 mg per tablet, Take 1 Tab by mouth daily. , Disp: 90 Tab, Rfl: 3    simvastatin (ZOCOR) 40 mg tablet, Take 1 Tab by mouth daily. , Disp: 90 Tab, Rfl: 3    Liraglutide (VICTOZA) 0.6 mg/0.1 mL (18 mg/3 mL) sub-q pen, 19mg/3ml pen         1.8mg per day  Indications: type 2 diabetes mellitus, Disp: 9 Syringe, Rfl: 3    doxycycline (ADOXA) 100 mg tablet, Take 1 Tab by mouth two (2) times a day. Indications: ACNE ROSACEA, Disp: 100 Tab, Rfl: 2    PEN NEEDLE, DIABETIC (NOVOFINE 32), 32 g by Does Not Apply route daily.  Indications: 32G x 6 MM,, Disp: , Rfl:    Date Last Reviewed:  8/30/2017     EXAMINATION:   Observation/Orthostatic Postural Assessment:          Standing:  · Left scap elevated   · B hips in slight flexion   · Decreased lumbar lordotic curve  · Left lumbar shift   Palpation:         Tender of mid lumbar region   ROM:            Lumbar spine Date:  8/9/17 Date:   Date:     Direction  Parameters Parameters Parameters   Flexion  25% cs     Extension  25%     Rotation  R: 25%  L: 15%     Side bending  R: 50%  L: 25% cs      Hip IR Very limited B      Hip ER Mild limitation B cs     Hip flexion  R: 90 degrees  L: 100 degrees cs     Cs= comparable sign (symptoms provoked)    Strength:            Lower quadrant    DATE  8/9/17 DATE     Hip flexion R: 4-  L: 4- R:   L:    Hip Abduction  R: 4-  L: 4- R:   L:    Hip Extension  R: --  L: -- R:   L:    Knee Flexion  R: 4  L: 4- R:   L:    Knee Extension  R: 4+  L: 4- R:   L:    Ankle Dorsiflexion  R: 4   L: 4 R:   L:   Ankle Plantarflexion  R: 4  L:4  R:  L:          Special Tests:          None tested   Neurological Screen:        Myotomes: weakness generalized, not specific to lumbar nerve roots Sensation: intact B LE         Reflexes: will test next visit   Functional Mobility:         Gait/Ambulation:  WBOS, ER B hips, increased lateral sway. Transfers:  Use of B UE sit to stand         Stairs:  Not tested    CLINICAL DECISION MAKING:   Outcome Measure: Tool Used: Modified Oswestry Low Back Pain Questionnaire  Score:  Initial: 38/50  Most Recent: X/50 (Date: -- )   Interpretation of Score: Each section is scored on a 0-5 scale, 5 representing the greatest disability. The scores of each section are added together for a total score of 50. Medical Necessity:   · Patient is expected to demonstrate progress in strength and range of motion to decrease assistance required with ADLs as well as work related activities. .  Reason for Services/Other Comments:  · Patient continues to require modification of therapeutic interventions to increase complexity of exercises. TREATMENT:   (In addition to Assessment/Re-Assessment sessions the following treatments were rendered)  Pre-treatment Symptoms/Complaints: pt states that he was not too painful this AM when first woke up but pain increased with standing activities. Pt describes pain as burning spanning across L4-5 level laterally B         Pain: Initial:     5/10 (8/10 with standing exercise ) Post Session:  4/10 in standing     THERAPEUTIC EXERCISE: (55 minutes):  Exercises per grid below to improve mobility, strength and coordination. Required moderate visual, verbal and manual cues to promote proper body alignment, promote proper body posture and promote proper body mechanics. Progressed resistance, range and repetitions as indicated.    Date:  8/11/17 Date:  8/16/17 Date:  8/18/17 8/21/17 8/23/17 8/25/17 8/30/17   Activity/Exercise Parameters Parameters Parameters       Education  HEP 2-3x/daily,Log-rolling, breathing out while tightening muscles and in when relaxing Breathing pattern with all exercises  HEP/deliberate movement with exercises. TrA isolation       LTR 12x B  5 x 5 sec  10 x B 5 x 10 sec holds  5 x 10 sec holds 5 x 10 sec holds     nustep  0 resist/5min 1.2 resist/ 8 min  2.5 resist/10min X 13 min level 3  X 10 min level 3  X 11 min level 3 X 11 min level 3    TAs 15 x 5 sec 10 x 5 Omitted today Refined today, 10 min of cuing and attempts until successful 5 reps, 5 sec holds,  Used stabilizer x 25 min focused on TrA iso and sustained hold with no compensatory muscle activation    Standing position     Supine add sq w/ TAs 15 x 5 sec 10 x 5  15 x 5sec   X 10,  5 sec     Supine TrA with mini march   X 5 B (alternating) Omitted today       Supine hip ABD w/ TAs 15 x 5sec Not today RTB, 15x 5sec   RTB 10, 5 sec    SKTC 10 x 10sec B 3 x 15 sec with 5 reps of knee extension during last  3 x 20sec w/ 10 reps of knee ext during last str       HS/GS str   2 x 20sec w/ strap w/ A 2 x 20 sec with strap       Anterior/posterior hip stretching   Figure 4 with manual IR/ER hip OP 3 x 15 sec left  L IR/ER w/ OP; 3 x 15sec Figure 4        bridge Next visit X 2 difficulty did not add to HEP, but will continue to attempt  3x throughout treatment for positioning -painful X 5, 5 sec   X 6, 5 sec holds (difficult)    multif (opp heel and elbow into mat)--supine  Next visit 5 sec x 5  5 sec x 7 5 sec x 5 cuing for decreased force       Long axis distraction L LE   3min       Sit to stand      With TrA contraction x 10       Side stepping       2 laps in hallway X 2 laps focusing on good lumbar and hip position    Hip ER/IR      Supine knee extended x 10 B                         Manual 25 min:   · MFR superficial techniques including skin rolling and skin gliding caudally and lateral (B). Modalities: to decrease pain level and inflammation   · IFC lumbar/sacral region - 15.1 volts, 10 min with ice application in prone position pillow under ankles.        Treatment/Session Assessment:  Pt with significant MF tightness across low back in multiple directions. Needs multiple cues to minimize ER of B hips with side stepping exercise.    · Response to Treatment: decreased pain and tightness through lower lumbar region with treatment today  · Recommendations/Intent for next treatment session: reassess effects of last treatment and continue as appropriate with manual       Future Appointments  Date Time Provider Jonas Denton   9/1/2017 10:00 AM Negra Perez, PT [facility]   9/6/2017 11:00 AM Eugenio Haq, PT [facility]   9/8/2017 10:00 AM Negra Perez, PT [facility]   9/11/2017 11:00 AM Lonzie Severino, PT [facility]   9/13/2017 10:00 AM Dmitrye Severino, PT [facility]   9/15/2017 11:00 AM Dmitrye Severino, PT [facility]   10/9/2017 12:30 PM Dhaval Arreola MD [address]   10/11/2017 3:00 PM MD [facility] White   10/23/2017 10:00 AM Negra Perez, PT [facility]   2/20/2018 8:10 AM [facility]   2/23/2018 3:30 PM MD LUCILA White       Total Treatment Duration:  PT Patient Time In/Time Out  Time In: 1100  Time Out: [address], PT

## 2017-09-01 ENCOUNTER — HOSPITAL ENCOUNTER (OUTPATIENT)
Dept: PHYSICAL THERAPY | Age: 57
Discharge: HOME | End: 2017-09-01
Payer: COMMERCIAL

## 2017-09-01 PROCEDURE — 97140 MANUAL THERAPY 1/> REGIONS: CPT | Performed by: PHYSICAL THERAPIST

## 2017-09-01 PROCEDURE — 97110 THERAPEUTIC EXERCISES: CPT | Performed by: PHYSICAL THERAPIST

## 2017-09-01 NOTE — PROGRESS NOTES
Adriel Harrison  : 1960 [id number] at [place] [address] [address] [place] [address], [facility], [address] [place] [address]  [phone]   [phone]         OUTPATIENT PHYSICAL THERAPY:Daily Note 2017    ICD-10: Treatment Diagnosis: low back pain (M54.5)  Precautions/Allergies:   Review of patient's allergies indicates no known allergies. Fall Risk Score: 2 (? 5 = High Risk)  MD Orders: evaluate and treat MEDICAL/REFERRING DIAGNOSIS:  Low back pain [M54.5]   DATE OF ONSET: chronic   REFERRING PHYSICIAN: Calista Arrington MD  RETURN PHYSICIAN APPOINTMENT: 10/9/17     INITIAL ASSESSMENT:  Mr. Mario Garcia presents significant functional limitations due to back pain. PT evaluation reveals significant weakness of core and LE musculature, decreased functional AROM lumbar and B hip joints, altered spinal alignment, and poor pain management. Pt would highly benefit from skilled PT to address problems below and improve overall functional mobility. PROBLEM LIST (Impacting functional limitations):  1. Decreased Strength  2. Decreased ADL/Functional Activities  3. Increased Pain  4. Decreased Flexibility/Joint Mobility  5. Decreased [place] with Home Exercise Program INTERVENTIONS PLANNED:  1. Cold  2. Heat  3. Home Exercise Program (HEP)  4. Manual Therapy  5. Neuromuscular Re-education/Strengthening  6. Range of Motion (ROM)  7. Therapeutic Exercise/Strengthening   TREATMENT PLAN:  Effective Dates: 17 TO 10/9/17. Frequency/Duration: 3 times a week for 6 weeks  GOALS: (Goals have been discussed and agreed upon with patient.)  SHORT-TERM FUNCTIONAL GOALS: Time Frame: 2-3 weeks   1. Pt will be independent with HEP focusing on core stability and promoting good spinal alignment. 2. Pt will report no symptoms of LE for 1-2 weeks demonstrating centralization of symptoms. 3. Pt will report pain level does not exceed 5/10 in a 1-2 week period of time to demonstrate pain management.   4. Pt will report improved sleep with less disturbance from back pain. DISCHARGE GOALS: Time Frame: 4-6 weeks   1. Pt will improve Oswestry score by at least 10 points. 2. Pt will demonstrate increased AROM of lumbar spine all planes ~ 25% or greater without report of pain to improve functional mobility. 3. Pt will be able to sustain regular exercise routine including aerobic exercise 3+ times per week without increased symptoms to encourage healthy lifestyle. 4. Pt will demonstrate increased B LE strength to at least 4+/5 B to aid with functional mobility. Rehabilitation Potential For Stated Goals: Good  Regarding Kathy Hammond's therapy, I certify that the treatment plan above will be carried out by a therapist or under their direction. Thank you for this referral,  Sudha Trotter, PT               The information in this section was collected on 8/7/17 (except where otherwise noted). HISTORY:   History of Present Injury/Illness (Reason for Referral):  Pt reports several years low back pain with a severe episode after lifting heavy objects in 2013 causing left low back L LE pain. Pt states that he has been receiving chiropractic treatments since then for pain management however, pain continued to bother him especially during/after work shifts at [employer]. He states that he is on his feet on concrete all day long and recently back pain interfering with job activities. In 3 months ago, pt reports he called off work due to pain too severe to perform work activities. Went to MD, imaging showing disc protrusion at L3 and stenosis at L1-2,  L5-S1. Referred to neuro specialist. Epidural injection ordered (not scheduled yet). PT referral with Neuro follow up in 4 weeks. Present symptoms (on day of initial evaluation): constant low back aching, mid to low back.   Occasional numbness of B LE (R>L)      · Aggravating factors: standing on hard surfaces 5 min, walking, stairs ambulation, bending   · Relieving factors: recliner, pain medication     · Pain level: 6/10 presently, 9/10 worst, 5/10 best     Past Medical History/Comorbidities:   Mr. Geovanny Hendricks  has a past medical history of Allergic rhinitis due to allergen; Arthritis; Colon polyps (8/12/2016); Deficiency, lipoprotein (8/12/2016); Diabetes mellitus type 2, controlled ([facility] [address] (8/12/2016); Encounter for long-term (current) use of medications (8/12/2016); HLD (hyperlipidemia) (8/12/2016); HTN (hypertension) (8/12/2016); Obesity (8/12/2016); Other abnormal glucose (8/12/2016); Sinusitis, acute maxillary (8/12/2016); and URI (upper respiratory infection) (8/12/2016). Mr. Geovanny Hendricks  has a past surgical history that includes tonsillectomy; adenoidectomy; and other surgical (2005). Social History/Living Environment:     lives with spouse   Prior Level of Function/Work/Activity:  Currently on disability   Dominant Side:         RIGHT  Other Clinical Tests:          MRI lumbar spine: (copied from radiology)   Disc desiccation and mild degenerative changes and facet arthropathy  throughout the lumbar spine. There is a significant central stenosis at L1-2;  the thecal sac measures 6 to 7 mm in AP diameter. At L4-3-4 there is a  broad-based left lateral disc protrusion impinging the enlarged L3 nerve root. Moderate foraminal stenoses at L5-S1  Previous Treatment Approaches:          Chiropractor   Current Medications:       Current Outpatient Prescriptions:     multivitamin (ONE A DAY) tablet, Take 1 Tab by mouth daily. , Disp: , Rfl:     Insulin Needles, Disposable, (NOVOFINE 32) 32 gauge x 1/4\" ndle, 1 misc daily. For Victoza pen, Disp: 100 Pen Needle, Rfl: 3    traMADol (ULTRAM) 50 mg tablet, Take 1 Tab by mouth every six (6) hours as needed for Pain. Max Daily Amount: 200 mg. Indications: Pain, Disp: 120 Tab, Rfl: 2    metoprolol succinate (TOPROL-XL) 100 mg tablet, Take 1 Tab by mouth daily. , Disp: 90 Tab, Rfl: 3    metroNIDAZOLE (METROGEL) 1 % topical gel, Apply 1 g to affected area daily. Use a thin layer to affected areas after washing, Disp: 60 g, Rfl: 5    lansoprazole (PREVACID) 30 mg capsule, Take 1 Cap by mouth Daily (before breakfast). , Disp: 90 Cap, Rfl: 3    lisinopril-hydroCHLOROthiazide (PRINZIDE, ZESTORETIC) 20-12.5 mg per tablet, Take 1 Tab by mouth daily. , Disp: 90 Tab, Rfl: 3    simvastatin (ZOCOR) 40 mg tablet, Take 1 Tab by mouth daily. , Disp: 90 Tab, Rfl: 3    Liraglutide (VICTOZA) 0.6 mg/0.1 mL (18 mg/3 mL) sub-q pen, 19mg/3ml pen         1.8mg per day  Indications: type 2 diabetes mellitus, Disp: 9 Syringe, Rfl: 3    doxycycline (ADOXA) 100 mg tablet, Take 1 Tab by mouth two (2) times a day. Indications: ACNE ROSACEA, Disp: 100 Tab, Rfl: 2    PEN NEEDLE, DIABETIC (NOVOFINE 32), 32 g by Does Not Apply route daily.  Indications: 32G x 6 MM,, Disp: , Rfl:    Date Last Reviewed:  9/1/2017     EXAMINATION:   Observation/Orthostatic Postural Assessment:          Standing:  · Left scap elevated   · B hips in slight flexion   · Decreased lumbar lordotic curve  · Left lumbar shift   Palpation:         Tender of mid lumbar region   ROM:            Lumbar spine Date:  8/9/17 Date:   Date:     Direction  Parameters Parameters Parameters   Flexion  25% cs     Extension  25%     Rotation  R: 25%  L: 15%     Side bending  R: 50%  L: 25% cs      Hip IR Very limited B      Hip ER Mild limitation B cs     Hip flexion  R: 90 degrees  L: 100 degrees cs     Cs= comparable sign (symptoms provoked)    Strength:            Lower quadrant    DATE  8/9/17 DATE     Hip flexion R: 4-  L: 4- R:   L:    Hip Abduction  R: 4-  L: 4- R:   L:    Hip Extension  R: --  L: -- R:   L:    Knee Flexion  R: 4  L: 4- R:   L:    Knee Extension  R: 4+  L: 4- R:   L:    Ankle Dorsiflexion  R: 4   L: 4 R:   L:   Ankle Plantarflexion  R: 4  L:4  R:  L:          Special Tests:          None tested   Neurological Screen:        Myotomes: weakness generalized, not specific to lumbar nerve roots Sensation: intact B LE         Reflexes: will test next visit   Functional Mobility:         Gait/Ambulation:  WBOS, ER B hips, increased lateral sway. Transfers:  Use of B UE sit to stand         Stairs:  Not tested    CLINICAL DECISION MAKING:   Outcome Measure: Tool Used: Modified Oswestry Low Back Pain Questionnaire  Score:  Initial: 38/50  Most Recent: X/50 (Date: -- )   Interpretation of Score: Each section is scored on a 0-5 scale, 5 representing the greatest disability. The scores of each section are added together for a total score of 50. Medical Necessity:   · Patient is expected to demonstrate progress in strength and range of motion to decrease assistance required with ADLs as well as work related activities. .  Reason for Services/Other Comments:  · Patient continues to require modification of therapeutic interventions to increase complexity of exercises. TREATMENT:   (In addition to Assessment/Re-Assessment sessions the following treatments were rendered)  Pre-treatment Symptoms/Complaints:  Pt reports increased LBP and stiffness/soreness this am but the cool, rainy weather. He states he felt looser for several hours after last PT visit, however, reports he feels the IFES made him more sore. Pain: Initial:     5-6/10 (7-810 with standing exercise ) Post Session:  3-4/10 in standing     THERAPEUTIC EXERCISE: (30minutes):  Exercises per grid below to improve mobility, strength and coordination. Required moderate visual, verbal and manual cues to promote proper body alignment, promote proper body posture and promote proper body mechanics. Progressed resistance, range and repetitions as indicated. Date:  8/18/17 8/21/17 8/23/17 8/25/17 8/30/17 9/1/17   Activity/Exercise Parameters        Education  HEP/deliberate movement with exercises.  TrA isolation        LTR 10 x B 5 x 10 sec holds  5 x 10 sec holds 5 x 10 sec holds      nustep  2.5 resist/10min X 13 min level 3  X 10 min level 3  X 11 min level 3 X 11 min level 3  X12min; L3   TAs Omitted today Refined today, 10 min of cuing and attempts until successful 5 reps, 5 sec holds,  Used stabilizer x 25 min focused on TrA iso and sustained hold with no compensatory muscle activation    Standing position      Supine add sq w/ TAs 15 x 5sec   X 10,  5 sec      Supine TrA with mini march  Omitted today        Supine hip ABD w/ TAs RTB, 15x 5sec   RTB 10, 5 sec     SKTC 3 x 20sec w/ 10 reps of knee ext during last str        HS/GS str 2 x 20sec w/ strap w/ A 2 x 20 sec with strap        Anterior/posterior hip stretching  L IR/ER w/ OP; 3 x 15sec Figure 4         bridge 3x throughout treatment for positioning -painful X 5, 5 sec   X 6, 5 sec holds (difficult)     multif (opp heel and elbow into mat)--supine  5 sec x 7 5 sec x 5 cuing for decreased force        Long axis distraction L LE 3min        Sit to stand    With TrA contraction x 10        Side stepping     2 laps in hallway X 2 laps focusing on good lumbar and hip position  X 2 laps in huff   Gait avoiding toe out      X 2 laps in huff   Sit-stand from mat at elevated height      X 15 working on avoiding ER for stabilization   Hip ER/IR    Supine knee extended x 10 B                        Manual 30 min:   · MFR superficial techniques including skin rolling and skin gliding caudally and lateral (B), after using foam roll and the stick initially, with pt positioned in prone with pillow under shins for unloading. STM/MFR to B QL to aide with decreasing pain and improving postural alignment. Treatment/Session Assessment:  Pt required vc to avoid B hip retroversion/toeing out for stability with gait, side-stepping and sit-stand. This B hip retroversion, L > R, is due to weakness and tightness. Some toe-ing out may be his natural alignment as he does it on his R as well but to a lesser degree. Sit-stand with good form/control was challenging due to core, hip and quad weakness. · Response to Treatment: Pt subjectively reported less pain and improved mobility after treatment today. Pt appeared to have more erect posture and gait fluidity after treatment. · Recommendations/Intent for next treatment session: Progress standing exercises and functional strength training as pt is able. Continue with modalities and manual therapy to aide with decreasing pain and tightness in preparation for pt's KAMALA injection.      Future Appointments  Date Time Provider Jonas D[place]   9/6/2017 11:00 AM Rosaura Jean PT [facility]   9/8/2017 10:00 AM Gemma Gonzalez, PT [facility]   9/11/2017 11:00 AM March Miranda PT [facility]   9/13/2017 10:00 AM [facility]   9/15/2017 11:00 AM [facility] Jean[facility]   10/9/2017 12:30 PM Janice Armenta MD [address]   10/11/2017 3:00 PM Velna Cabot, MD [facility]   10/23/2017 10:00 AM Gemma Gonzalez PT [facility]   2/20/2018 8:10 AM [facility]   2/23/2018 3:30 PM Velna Cabot, MD [facility]       Total Treatment Duration:  PT Patient Time In/Time Out  Time In: 1005  Time Out: [address], PT

## 2017-09-05 ENCOUNTER — HOSPITAL ENCOUNTER (OUTPATIENT)
Dept: PHYSICAL THERAPY | Age: 57
Discharge: HOME | End: 2017-09-05
Payer: COMMERCIAL

## 2017-09-05 PROCEDURE — 97110 THERAPEUTIC EXERCISES: CPT | Performed by: PHYSICAL THERAPIST

## 2017-09-05 PROCEDURE — 97140 MANUAL THERAPY 1/> REGIONS: CPT | Performed by: PHYSICAL THERAPIST

## 2017-09-05 NOTE — PROGRESS NOTES
Pauline Srivastava  : 1960 [id number] at [address], [address] [place] [address], [address]  [phone]   [phone]         OUTPATIENT PHYSICAL THERAPY:Daily Note 2017    ICD-10: Treatment Diagnosis: low back pain (M54.5)  Precautions/Allergies:   Review of patient's allergies indicates no known allergies. Fall Risk Score: 2 (? 5 = High Risk)  MD Orders: evaluate and treat MEDICAL/REFERRING DIAGNOSIS:  Low back pain [M54.5]   DATE OF ONSET: chronic   REFERRING PHYSICIAN: Whitney Whitaker MD  RETURN PHYSICIAN APPOINTMENT: 10/9/17     INITIAL ASSESSMENT:  Mr. Willy Fuentes presents significant functional limitations due to back pain. PT evaluation reveals significant weakness of core and LE musculature, decreased functional AROM lumbar and B hip joints, altered spinal alignment, and poor pain management. Pt would highly benefit from skilled PT to address problems below and improve overall functional mobility. PROBLEM LIST (Impacting functional limitations):  1. Decreased Strength  2. Decreased ADL/Functional Activities  3. Increased Pain  4. Decreased Flexibility/Joint Mobility  5. Decreased [place] with Home Exercise Program INTERVENTIONS PLANNED:  1. Cold  2. Heat  3. Home Exercise Program (HEP)  4. Manual Therapy  5. Neuromuscular Re-education/Strengthening  6. Range of Motion (ROM)  7. Therapeutic Exercise/Strengthening   TREATMENT PLAN:  Effective Dates: 17 TO 10/9/17. Frequency/Duration: 3 times a week for 6 weeks  GOALS: (Goals have been discussed and agreed upon with patient.)  SHORT-TERM FUNCTIONAL GOALS: Time Frame: 2-3 weeks   1. Pt will be independent with HEP focusing on core stability and promoting good spinal alignment. 2. Pt will report no symptoms of LE for 1-2 weeks demonstrating centralization of symptoms. 3. Pt will report pain level does not exceed 5/10 in a 1-2 week period of time to demonstrate pain management.   4. Pt will report improved sleep with less disturbance from back pain. DISCHARGE GOALS: Time Frame: 4-6 weeks   1. Pt will improve Oswestry score by at least 10 points. 2. Pt will demonstrate increased AROM of lumbar spine all planes ~ 25% or greater without report of pain to improve functional mobility. 3. Pt will be able to sustain regular exercise routine including aerobic exercise 3+ times per week without increased symptoms to encourage healthy lifestyle. 4. Pt will demonstrate increased B LE strength to at least 4+/5 B to aid with functional mobility. Rehabilitation Potential For Stated Goals: Good  Regarding Edward Hammond's therapy, I certify that the treatment plan above will be carried out by a therapist or under their direction. Thank you for this referral,  Dequan Chapman, PT               The information in this section was collected on 8/7/17 (except where otherwise noted). HISTORY:   History of Present Injury/Illness (Reason for Referral):  Pt reports several years low back pain with a severe episode after lifting heavy objects in 2013 causing left low back L LE pain. Pt states that he has been receiving chiropractic treatments since then for pain management however, pain continued to bother him especially during/after work shifts at [employer] ([employer]). He states that he is on his feet on concrete all day long and recently back pain interfering with job activities. In 3 months ago, pt reports he called off work due to pain too severe to perform work activities. Went to MD, imaging showing disc protrusion at L3 and stenosis at L1-2,  L5-S1. Referred to neuro specialist. Epidural injection ordered (not scheduled yet). PT referral with Neuro follow up in 4 weeks. Present symptoms (on day of initial evaluation): constant low back aching, mid to low back.   Occasional numbness of B LE (R>L)      · Aggravating factors: standing on hard surfaces 5 min, walking, stairs ambulation, bending   · Relieving factors: recliner, pain medication     · Pain level: 6/10 presently, 9/10 worst, 5/10 best     Past Medical History/Comorbidities:   Mr. Tio Byrd  has a past medical history of Allergic rhinitis due to allergen; Arthritis; Colon polyps (8/12/2016); Deficiency, lipoprotein (8/12/2016); Diabetes mellitus type 2, controlled ([facility] [address] (8/12/2016); Encounter for long-term (current) use of medications (8/12/2016); HLD (hyperlipidemia) (8/12/2016); HTN (hypertension) (8/12/2016); Obesity (8/12/2016); Other abnormal glucose (8/12/2016); Sinusitis, acute maxillary (8/12/2016); and URI (upper respiratory infection) (8/12/2016). Mr. Tio Byrd  has a past surgical history that includes tonsillectomy; adenoidectomy; and other surgical (2005). Social History/Living Environment:     lives with spouse   Prior Level of Function/Work/Activity:  Currently on disability   Dominant Side:         RIGHT  Other Clinical Tests:          MRI lumbar spine: (copied from radiology)   Disc desiccation and mild degenerative changes and facet arthropathy  throughout the lumbar spine. There is a significant central stenosis at L1-2;  the thecal sac measures 6 to 7 mm in AP diameter. At L4-3-4 there is a  broad-based left lateral disc protrusion impinging the enlarged L3 nerve root. Moderate foraminal stenoses at L5-S1  Previous Treatment Approaches:          Chiropractor   Current Medications:       Current Outpatient Prescriptions:     multivitamin (ONE A DAY) tablet, Take 1 Tab by mouth daily. , Disp: , Rfl:     Insulin Needles, Disposable, (NOVOFINE 32) 32 gauge x 1/4\" ndle, 1 misc daily. For Victoza pen, Disp: 100 Pen Needle, Rfl: 3    traMADol (ULTRAM) 50 mg tablet, Take 1 Tab by mouth every six (6) hours as needed for Pain. Max Daily Amount: 200 mg. Indications: Pain, Disp: 120 Tab, Rfl: 2    metoprolol succinate (TOPROL-XL) 100 mg tablet, Take 1 Tab by mouth daily. , Disp: 90 Tab, Rfl: 3    metroNIDAZOLE (METROGEL) 1 % topical gel, Apply 1 g to affected area daily. Use a thin layer to affected areas after washing, Disp: 60 g, Rfl: 5    lansoprazole (PREVACID) 30 mg capsule, Take 1 Cap by mouth Daily (before breakfast). , Disp: 90 Cap, Rfl: 3    lisinopril-hydroCHLOROthiazide (PRINZIDE, ZESTORETIC) 20-12.5 mg per tablet, Take 1 Tab by mouth daily. , Disp: 90 Tab, Rfl: 3    simvastatin (ZOCOR) 40 mg tablet, Take 1 Tab by mouth daily. , Disp: 90 Tab, Rfl: 3    Liraglutide (VICTOZA) 0.6 mg/0.1 mL (18 mg/3 mL) sub-q pen, 19mg/3ml pen         1.8mg per day  Indications: type 2 diabetes mellitus, Disp: 9 Syringe, Rfl: 3    doxycycline (ADOXA) 100 mg tablet, Take 1 Tab by mouth two (2) times a day. Indications: ACNE ROSACEA, Disp: 100 Tab, Rfl: 2    PEN NEEDLE, DIABETIC (NOVOFINE 32), 32 g by Does Not Apply route daily.  Indications: 32G x 6 MM,, Disp: , Rfl:    Date Last Reviewed:  9/5/2017     EXAMINATION:   Observation/Orthostatic Postural Assessment:          Standing:  · Left scap elevated   · B hips in slight flexion   · Decreased lumbar lordotic curve  · Left lumbar shift   Palpation:         Tender of mid lumbar region   ROM:            Lumbar spine Date:  8/9/17 Date:   Date:     Direction  Parameters Parameters Parameters   Flexion  25% cs     Extension  25%     Rotation  R: 25%  L: 15%     Side bending  R: 50%  L: 25% cs      Hip IR Very limited B      Hip ER Mild limitation B cs     Hip flexion  R: 90 degrees  L: 100 degrees cs     Cs= comparable sign (symptoms provoked)    Strength:            Lower quadrant    DATE  8/9/17 DATE     Hip flexion R: 4-  L: 4- R:   L:    Hip Abduction  R: 4-  L: 4- R:   L:    Hip Extension  R: --  L: -- R:   L:    Knee Flexion  R: 4  L: 4- R:   L:    Knee Extension  R: 4+  L: 4- R:   L:    Ankle Dorsiflexion  R: 4   L: 4 R:   L:   Ankle Plantarflexion  R: 4  L:4  R:  L:          Special Tests:          None tested   Neurological Screen:        Myotomes: weakness generalized, not specific to lumbar nerve roots Sensation: intact B LE         Reflexes: will test next visit   Functional Mobility:         Gait/Ambulation:  WBOS, ER B hips, increased lateral sway. Transfers:  Use of B UE sit to stand         Stairs:  Not tested    CLINICAL DECISION MAKING:   Outcome Measure: Tool Used: Modified Oswestry Low Back Pain Questionnaire  Score:  Initial: 38/50  Most Recent: X/50 (Date: -- )   Interpretation of Score: Each section is scored on a 0-5 scale, 5 representing the greatest disability. The scores of each section are added together for a total score of 50. Medical Necessity:   · Patient is expected to demonstrate progress in strength and range of motion to decrease assistance required with ADLs as well as work related activities. .  Reason for Services/Other Comments:  · Patient continues to require modification of therapeutic interventions to increase complexity of exercises. TREATMENT:   (In addition to Assessment/Re-Assessment sessions the following treatments were rendered)  Pre-treatment Symptoms/Complaints:  Pt with increased LB stiffness/soreness today. He states he was sore from the manual therapy, immediately after his last visit, however, reports less pain the following day. Pain: Initial:     5/10 (6-7/10 with standing exercise ) Post Session:  3/10 in standing     THERAPEUTIC EXERCISE: (30minutes):  Exercises per grid below to improve mobility, strength and coordination. Required moderate visual, verbal and manual cues to promote proper body alignment, promote proper body posture and promote proper body mechanics. Progressed resistance, range and repetitions as indicated.    8/21/17 8/23/17 8/25/17 8/30/17 9/1/17 9/5/17   Activity/Exercise         Education  TrA isolation         LTR 5 x 10 sec holds  5 x 10 sec holds 5 x 10 sec holds       nustep  X 13 min level 3  X 10 min level 3  X 11 min level 3 X 11 min level 3  X12min; L3 X 13min; L 4   TAs Refined today, 10 min of cuing and attempts until successful 5 reps, 5 sec holds,  Used stabilizer x 25 min focused on TrA iso and sustained hold with no compensatory muscle activation    Standing position       Supine add sq w/ TAs   X 10,  5 sec       Supine TrA with mini march          Supine hip ABD w/ TAs   RTB 10, 5 sec      SKTC         HS/GS str 2 x 20 sec with strap         Anterior/posterior hip stretching  Figure 4          bridge X 5, 5 sec   X 6, 5 sec holds (difficult)      multif (opp heel and elbow into mat)--supine  5 sec x 5 cuing for decreased force         Long axis distraction L LE         Sit to stand   With TrA contraction x 10         Side stepping    2 laps in hallway X 2 laps focusing on good lumbar and hip position  X 2 laps in huff X 4 laps in huff   Gait avoiding toe out     X 2 laps in huff X 4 laps in huff   Marching with high knees working on hip/core strength and SLS balance       x 4 laps in huff   Sit-stand from mat at elevated height     X 15 working on avoiding ER for stabilization X 25   Hip ER/IR   Supine knee extended x 10 B                         Manual 30 min:   · MFR superficial techniques including skin rolling and skin gliding caudally and lateral (B), after using foam roll and the stick initially, with pt positioned in prone with pillow under shins for unloading. STM/MFR to B paraspinals, QL, and glute medius/minimus to aide with decreasing pain and improving postural alignment. Treatment/Session Assessment:  Pt with significantly improved tissue mobility today. He was less guarded and subjectively reported less pain with manual therapy. Glute medius/minimus and QL remain tight, however, he tolerated more aggressive manual therapy today. His quality and tolerance of standing/functional strength training was improved today. · Response to Treatment: SLS on L during marching activity was challenging due to decreased L LE strength and proprioception.  He subjectively reported less pain and his gait appeared less antalgic on exit compared to entry. · Recommendations/Intent for next treatment session: Progress standing exercises and functional strength training as pt is able. Continue with modalities and manual therapy to aide with decreasing pain and tightness in preparation for pt's KAMALA injection. Re-assess later this week.      Future Appointments  Date Time Provider [place]   9/8/2017 10:00 AM Dhiraj Pitts, PT [facility] [place] [facility]   9/11/2017 11:00 AM Darrol Lav, PT [facility]   9/13/2017 10:00 AM Darrol Lav, PT [facility]   9/15/2017 11:00 AM Darrol Lav, PT [facility]   10/9/2017 12:30 PM MD [address]   10/11/2017 3:00 PM MD LUCILA Crews   10/23/2017 10:00 AM Dhiraj Pitts, PT [facility]   2/20/2018 8:10 AM [facility]   2/23/2018 3:30 PM MD LUCILA Crews       Total Treatment Duration:  PT Patient Time In/Time Out  Time In: 4179  Time Out: 87 Alexa Flowers, PT

## 2017-09-08 ENCOUNTER — HOSPITAL ENCOUNTER (OUTPATIENT)
Dept: PHYSICAL THERAPY | Age: 57
Discharge: HOME | End: 2017-09-08
Payer: COMMERCIAL

## 2017-09-08 NOTE — PROGRESS NOTES
Pt called to cancel PT appointment this am due to having a sick daughter. He is scheduled to return to PT next week.

## 2017-09-11 ENCOUNTER — HOSPITAL ENCOUNTER (OUTPATIENT)
Dept: PHYSICAL THERAPY | Age: 57
Discharge: HOME | End: 2017-09-11
Payer: COMMERCIAL

## 2017-09-11 PROCEDURE — 97140 MANUAL THERAPY 1/> REGIONS: CPT

## 2017-09-11 PROCEDURE — 97110 THERAPEUTIC EXERCISES: CPT

## 2017-09-11 NOTE — PROGRESS NOTES
Marv Manner  : 1960 [address] at [place] [address] [address] [facility], [facility], [address] [place] [address]  [phone]   [phone]         OUTPATIENT PHYSICAL THERAPY:Daily Note 2017    ICD-10: Treatment Diagnosis: low back pain (M54.5)  Precautions/Allergies:   Review of patient's allergies indicates no known allergies. Fall Risk Score: 2 (? 5 = High Risk)  MD Orders: evaluate and treat MEDICAL/REFERRING DIAGNOSIS:  Low back pain [M54.5]   DATE OF ONSET: chronic   REFERRING PHYSICIAN: Ivelisse Abernathy MD  RETURN PHYSICIAN APPOINTMENT: 10/9/17     INITIAL ASSESSMENT:  Mr. Jb Willett presents significant functional limitations due to back pain. PT evaluation reveals significant weakness of core and LE musculature, decreased functional AROM lumbar and B hip joints, altered spinal alignment, and poor pain management. Pt would highly benefit from skilled PT to address problems below and improve overall functional mobility. PROBLEM LIST (Impacting functional limitations):  1. Decreased Strength  2. Decreased ADL/Functional Activities  3. Increased Pain  4. Decreased Flexibility/Joint Mobility  5. Decreased [place] with Home Exercise Program INTERVENTIONS PLANNED:  1. Cold  2. Heat  3. Home Exercise Program (HEP)  4. Manual Therapy  5. Neuromuscular Re-education/Strengthening  6. Range of Motion (ROM)  7. Therapeutic Exercise/Strengthening   TREATMENT PLAN:  Effective Dates: 17 TO 10/9/17. Frequency/Duration: 3 times a week for 6 weeks  GOALS: (Goals have been discussed and agreed upon with patient.)  SHORT-TERM FUNCTIONAL GOALS: Time Frame: 2-3 weeks   1. Pt will be independent with HEP focusing on core stability and promoting good spinal alignment. 2. Pt will report no symptoms of LE for 1-2 weeks demonstrating centralization of symptoms. 3. Pt will report pain level does not exceed 5/10 in a 1-2 week period of time to demonstrate pain management.   4. Pt will report improved sleep with less disturbance from back pain. DISCHARGE GOALS: Time Frame: 4-6 weeks   1. Pt will improve Oswestry score by at least 10 points. 2. Pt will demonstrate increased AROM of lumbar spine all planes ~ 25% or greater without report of pain to improve functional mobility. 3. Pt will be able to sustain regular exercise routine including aerobic exercise 3+ times per week without increased symptoms to encourage healthy lifestyle. 4. Pt will demonstrate increased B LE strength to at least 4+/5 B to aid with functional mobility. Rehabilitation Potential For Stated Goals: Good  Regarding Sunitaford Manciaks's therapy, I certify that the treatment plan above will be carried out by a therapist or under their direction. Thank you for this referral,  Anton Morales, PT               The information in this section was collected on 8/7/17 (except where otherwise noted). HISTORY:   History of Present Injury/Illness (Reason for Referral):  Pt reports several years low back pain with a severe episode after lifting heavy objects in 2013 causing left low back L LE pain. Pt states that he has been receiving chiropractic treatments since then for pain management however, pain continued to bother him especially during/after work shifts at "[employer]. He states that he is on his feet on concrete all day long and recently back pain interfering with job activities. In 3 months ago, pt reports he called off work due to pain too severe to perform work activities. Went to MD, imaging showing disc protrusion at L3 and stenosis at L1-2,  L5-S1. Referred to neuro specialist. Epidural injection ordered (not scheduled yet). PT referral with Neuro follow up in 4 weeks. Present symptoms (on day of initial evaluation): constant low back aching, mid to low back.   Occasional numbness of B LE (R>L)      · Aggravating factors: standing on hard surfaces 5 min, walking, stairs ambulation, bending   · Relieving factors: recliner, pain medication     · Pain level: 6/10 presently, 9/10 worst, 5/10 best     Past Medical History/Comorbidities:   Mr. Jessica Thrasher  has a past medical history of Allergic rhinitis due to allergen; Arthritis; Colon polyps (8/12/2016); Deficiency, lipoprotein (8/12/2016); Diabetes mellitus type 2, controlled ([facility] [address] (8/12/2016); Encounter for long-term (current) use of medications (8/12/2016); HLD (hyperlipidemia) (8/12/2016); HTN (hypertension) (8/12/2016); Obesity (8/12/2016); Other abnormal glucose (8/12/2016); Sinusitis, acute maxillary (8/12/2016); and URI (upper respiratory infection) (8/12/2016). Mr. Jessica Thrasher  has a past surgical history that includes tonsillectomy; adenoidectomy; and other surgical (2005). Social History/Living Environment:     lives with spouse   Prior Level of Function/Work/Activity:  Currently on disability   Dominant Side:         RIGHT  Other Clinical Tests:          MRI lumbar spine: (copied from radiology)   Disc desiccation and mild degenerative changes and facet arthropathy  throughout the lumbar spine. There is a significant central stenosis at L1-2;  the thecal sac measures 6 to 7 mm in AP diameter. At L4-3-4 there is a  broad-based left lateral disc protrusion impinging the enlarged L3 nerve root. Moderate foraminal stenoses at L5-S1  Previous Treatment Approaches:          Chiropractor   Current Medications:       Current Outpatient Prescriptions:     multivitamin (ONE A DAY) tablet, Take 1 Tab by mouth daily. , Disp: , Rfl:     Insulin Needles, Disposable, (NOVOFINE 32) 32 gauge x 1/4\" ndle, 1 misc daily. For Victoza pen, Disp: 100 Pen Needle, Rfl: 3    traMADol (ULTRAM) 50 mg tablet, Take 1 Tab by mouth every six (6) hours as needed for Pain. Max Daily Amount: 200 mg. Indications: Pain, Disp: 120 Tab, Rfl: 2    metoprolol succinate (TOPROL-XL) 100 mg tablet, Take 1 Tab by mouth daily. , Disp: 90 Tab, Rfl: 3    metroNIDAZOLE (METROGEL) 1 % topical gel, Apply 1 g to affected area daily. Use a thin layer to affected areas after washing, Disp: 60 g, Rfl: 5    lansoprazole (PREVACID) 30 mg capsule, Take 1 Cap by mouth Daily (before breakfast). , Disp: 90 Cap, Rfl: 3    lisinopril-hydroCHLOROthiazide (PRINZIDE, ZESTORETIC) 20-12.5 mg per tablet, Take 1 Tab by mouth daily. , Disp: 90 Tab, Rfl: 3    simvastatin (ZOCOR) 40 mg tablet, Take 1 Tab by mouth daily. , Disp: 90 Tab, Rfl: 3    Liraglutide (VICTOZA) 0.6 mg/0.1 mL (18 mg/3 mL) sub-q pen, 19mg/3ml pen         1.8mg per day  Indications: type 2 diabetes mellitus, Disp: 9 Syringe, Rfl: 3    doxycycline (ADOXA) 100 mg tablet, Take 1 Tab by mouth two (2) times a day. Indications: ACNE ROSACEA, Disp: 100 Tab, Rfl: 2    PEN NEEDLE, DIABETIC (NOVOFINE 32), 32 g by Does Not Apply route daily.  Indications: 32G x 6 MM,, Disp: , Rfl:    Date Last Reviewed:  9/11/2017     EXAMINATION:   Observation/Orthostatic Postural Assessment:          Standing:  · Left scap elevated   · B hips in slight flexion   · Decreased lumbar lordotic curve  · Left lumbar shift   Palpation:         Tender of mid lumbar region   ROM:            Lumbar spine Date:  8/9/17 Date:   Date:     Direction  Parameters Parameters Parameters   Flexion  25% cs     Extension  25%     Rotation  R: 25%  L: 15%     Side bending  R: 50%  L: 25% cs      Hip IR Very limited B      Hip ER Mild limitation B cs     Hip flexion  R: 90 degrees  L: 100 degrees cs     Cs= comparable sign (symptoms provoked)    Strength:            Lower quadrant    DATE  8/9/17 DATE     Hip flexion R: 4-  L: 4- R:   L:    Hip Abduction  R: 4-  L: 4- R:   L:    Hip Extension  R: --  L: -- R:   L:    Knee Flexion  R: 4  L: 4- R:   L:    Knee Extension  R: 4+  L: 4- R:   L:    Ankle Dorsiflexion  R: 4   L: 4 R:   L:   Ankle Plantarflexion  R: 4  L:4  R:  L:          Special Tests:          None tested   Neurological Screen:        Myotomes: weakness generalized, not specific to lumbar nerve roots Sensation: intact B LE         Reflexes: will test next visit   Functional Mobility:         Gait/Ambulation:  WBOS, ER B hips, increased lateral sway. Transfers:  Use of B UE sit to stand         Stairs:  Not tested    CLINICAL DECISION MAKING:   Outcome Measure: Tool Used: Modified Oswestry Low Back Pain Questionnaire  Score:  Initial: 38/50  Most Recent: X/50 (Date: -- )   Interpretation of Score: Each section is scored on a 0-5 scale, 5 representing the greatest disability. The scores of each section are added together for a total score of 50. Medical Necessity:   · Patient is expected to demonstrate progress in strength and range of motion to decrease assistance required with ADLs as well as work related activities. .  Reason for Services/Other Comments:  · Patient continues to require modification of therapeutic interventions to increase complexity of exercises. TREATMENT:   (In addition to Assessment/Re-Assessment sessions the following treatments were rendered)  Pre-treatment Symptoms/Complaints: bad weather increased pain today. Pt states that he feels better after manual most sessions. He continues to have tightness of B hips limiting lower body dressing. He will be cancelling rest of week sessions due to a death in family and KAMALA injection. Will continue with PT sessions next week. Pain: Initial:     5/10  Post Session:  3/10 sore     THERAPEUTIC EXERCISE: (25 minutes):  Exercises per grid below to improve mobility, strength and coordination. Required moderate visual, verbal and manual cues to promote proper body alignment, promote proper body posture and promote proper body mechanics. Progressed resistance, range and repetitions as indicated.    8/21/17 8/23/17 8/25/17 8/30/17 9/1/17 9/5/17 9/11/17   Activity/Exercise          Education  TrA isolation          LTR 5 x 10 sec holds  5 x 10 sec holds 5 x 10 sec holds        nustep  X 13 min level 3  X 10 min level 3  X 11 min level 3 X 11 min level 3  X12min; L3 X 13min; L 4 X 12 min L4   TAs Refined today, 10 min of cuing and attempts until successful 5 reps, 5 sec holds,  Used stabilizer x 25 min focused on TrA iso and sustained hold with no compensatory muscle activation    Standing position     Encouraged TrA engagement during standing exercises    Supine add sq w/ TAs   X 10,  5 sec        Supine TrA with mini march           Supine hip ABD w/ TAs   RTB 10, 5 sec       SKTC          HS/GS str 2 x 20 sec with strap          Anterior/posterior hip stretching  Figure 4           bridge X 5, 5 sec   X 6, 5 sec holds (difficult)       multif (opp heel and elbow into mat)--supine  5 sec x 5 cuing for decreased force          Long axis distraction L LE          Sit to stand   With TrA contraction x 10          Side stepping    2 laps in hallway X 2 laps focusing on good lumbar and hip position  X 2 laps in huff X 4 laps in huff X 4 laps in huff    Gait avoiding toe out     X 2 laps in huff X 4 laps in huff X 4 laps with weighted ball held at eye level to promote decreased lateral movement    Marching with high knees working on hip/core strength and SLS balance       x 4 laps in huff X 2 laps in huff    Sit-stand from mat at elevated height     X 15 working on avoiding ER for stabilization X 25    Hip ER/IR   Supine knee extended x 10 B                            Manual 30 min: to decrease joint stiffness and soft tissue restriction    · MFR superficial techniques including skin rolling and skin gliding caudally and lateral (B) with pt positioned in prone with pillow under shins for unloading. STM/MFR to B paraspinals, QL  · CPA's grade IV- of all levels lower t-spine, L-spine and sacrum  · B hip IR mobs in prone grade III       · Treatment/Session Assessment:  Superficial lumbar and lower thoracic tissue movement today was very impeded. Not much progress sustained from last visit.     His quality and tolerance of standing/functional strength training has improved   · Response to Treatment: Marching activity continues to be very challenging, mostly with left SLS. He demonstrated improper weight shift over left LE at least 50% of time but was able to improve with verbal cuing. He stated some increase of lumbar pain with gait activities. States sore after manual but gait improved. · Recommendations/Intent for next treatment session: Progress standing exercises and functional strength training as pt is able. Assess results of KAMALA.      Future Appointments  Date Time Provider Jonas Lawsoni   10/9/2017 12:30 PM MD Bhaskar Perezllyntie    10/11/2017 3:00 PM MD LUCILA Johnson   10/23/2017 10:00 AM Becyk Weaver, PT [facility]   2/20/2018 8:10 AM PIE LAB PIE PIE   2/23/2018 3:30 PM MD LUCILA Johnson       Total Treatment Duration:  PT Patient Time In/Time Out  Time In: 1100  Time Out: [address], PT

## 2017-09-13 ENCOUNTER — APPOINTMENT (OUTPATIENT)
Dept: PHYSICAL THERAPY | Age: 57
End: 2017-09-13
Payer: COMMERCIAL

## 2017-09-15 ENCOUNTER — APPOINTMENT (OUTPATIENT)
Dept: PHYSICAL THERAPY | Age: 57
End: 2017-09-15
Payer: COMMERCIAL

## 2017-09-22 ENCOUNTER — HOSPITAL ENCOUNTER (OUTPATIENT)
Dept: PHYSICAL THERAPY | Age: 57
Discharge: HOME | End: 2017-09-22
Payer: COMMERCIAL

## 2017-09-22 NOTE — PROGRESS NOTES
Pt called to cancel today's PT appointment due to being sick. He is scheduled to be seen for PT next week.

## 2017-09-29 ENCOUNTER — HOSPITAL ENCOUNTER (OUTPATIENT)
Dept: PHYSICAL THERAPY | Age: 57
Discharge: HOME | End: 2017-09-29
Payer: COMMERCIAL

## 2017-09-29 PROCEDURE — 97110 THERAPEUTIC EXERCISES: CPT

## 2017-09-29 PROCEDURE — 97140 MANUAL THERAPY 1/> REGIONS: CPT

## 2017-09-29 NOTE — PROGRESS NOTES
Rosy Carlos  : 1960 [id number] at [address] [place] [address], [facility], [address] [place] [address]  [phone]   [phone]         OUTPATIENT PHYSICAL THERAPY:Daily Note 2017    ICD-10: Treatment Diagnosis: low back pain (M54.5)  Precautions/Allergies:   Review of patient's allergies indicates no known allergies. Fall Risk Score: 2 (? 5 = High Risk)  MD Orders: evaluate and treat MEDICAL/REFERRING DIAGNOSIS:  Low back pain [M54.5]   DATE OF ONSET: chronic   REFERRING PHYSICIAN: Corey Hunt MD  RETURN PHYSICIAN APPOINTMENT: 10/9/17     INITIAL ASSESSMENT:  Mr. Len Wilson presents significant functional limitations due to back pain. PT evaluation reveals significant weakness of core and LE musculature, decreased functional AROM lumbar and B hip joints, altered spinal alignment, and poor pain management. Pt would highly benefit from skilled PT to address problems below and improve overall functional mobility. PROBLEM LIST (Impacting functional limitations):  1. Decreased Strength  2. Decreased ADL/Functional Activities  3. Increased Pain  4. Decreased Flexibility/Joint Mobility  5. Decreased Oliver with Home Exercise Program INTERVENTIONS PLANNED:  1. Cold  2. Heat  3. Home Exercise Program (HEP)  4. Manual Therapy  5. Neuromuscular Re-education/Strengthening  6. Range of Motion (ROM)  7. Therapeutic Exercise/Strengthening   TREATMENT PLAN:  Effective Dates: 17 TO 10/9/17. Frequency/Duration: 3 times a week for 6 weeks  GOALS: (Goals have been discussed and agreed upon with patient.)  SHORT-TERM FUNCTIONAL GOALS: Time Frame: 2-3 weeks   1. Pt will be independent with HEP focusing on core stability and promoting good spinal alignment. 2. Pt will report no symptoms of LE for 1-2 weeks demonstrating centralization of symptoms. 3. Pt will report pain level does not exceed 5/10 in a 1-2 week period of time to demonstrate pain management.   4. Pt will report improved sleep with less disturbance from back pain. DISCHARGE GOALS: Time Frame: 4-6 weeks   1. Pt will improve Oswestry score by at least 10 points. 2. Pt will demonstrate increased AROM of lumbar spine all planes ~ 25% or greater without report of pain to improve functional mobility. 3. Pt will be able to sustain regular exercise routine including aerobic exercise 3+ times per week without increased symptoms to encourage healthy lifestyle. 4. Pt will demonstrate increased B LE strength to at least 4+/5 B to aid with functional mobility. Rehabilitation Potential For Stated Goals: Good  [facility], I certify that the treatment plan above will be carried out by a therapist or under their direction. Thank you for this referral,  Tammie Lima, PT               The information in this section was collected on 8/7/17 (except where otherwise noted). HISTORY:   History of Present Injury/Illness (Reason for Referral):  Pt reports several years low back pain with a severe episode after lifting heavy objects in 2013 causing left low back L LE pain. Pt states that he has been receiving chiropractic treatments since then for pain management however, pain continued to bother him especially during/after work shifts at [employer]. He states that he is on his feet on concrete all day long and recently back pain interfering with job activities. In 3 months ago, pt reports he called off work due to pain too severe to perform work activities. Went to MD, imaging showing disc protrusion at L3 and stenosis at L1-2,  L5-S1. Referred to neuro specialist. Epidural injection ordered (not scheduled yet). PT referral with Neuro follow up in 4 weeks. Present symptoms (on day of initial evaluation): constant low back aching, mid to low back.   Occasional numbness of B LE (R>L)      · Aggravating factors: standing on hard surfaces 5 min, walking, stairs ambulation, bending   · Relieving factors: recliner, pain medication     · Pain level: 6/10 presently, 9/10 worst, 5/10 best     Past Medical History/Comorbidities:   Mr. Leela Rothman  has a past medical history of Allergic rhinitis due to allergen; Arthritis; Colon polyps (8/12/2016); Deficiency, lipoprotein (8/12/2016); Diabetes mellitus type 2, controlled ([facility] [address] (8/12/2016); Encounter for long-term (current) use of medications (8/12/2016); HLD (hyperlipidemia) (8/12/2016); HTN (hypertension) (8/12/2016); Obesity (8/12/2016); Other abnormal glucose (8/12/2016); Sinusitis, acute maxillary (8/12/2016); and URI (upper respiratory infection) (8/12/2016). Mr. Leela Rothman  has a past surgical history that includes tonsillectomy; adenoidectomy; and other surgical (2005). Social History/Living Environment:     lives with spouse   Prior Level of Function/Work/Activity:  Currently on disability   Dominant Side:         RIGHT  Other Clinical Tests:          MRI lumbar spine: (copied from radiology)   Disc desiccation and mild degenerative changes and facet arthropathy  throughout the lumbar spine. There is a significant central stenosis at L1-2;  the thecal sac measures 6 to 7 mm in AP diameter. At L4-3-4 there is a  broad-based left lateral disc protrusion impinging the enlarged L3 nerve root. Moderate foraminal stenoses at L5-S1  Previous Treatment Approaches:          Chiropractor   Current Medications:       Current Outpatient Prescriptions:     multivitamin (ONE A DAY) tablet, Take 1 Tab by mouth daily. , Disp: , Rfl:     Insulin Needles, Disposable, (NOVOFINE 32) 32 gauge x 1/4\" ndle, 1 misc daily. For Victoza pen, Disp: 100 Pen Needle, Rfl: 3    traMADol (ULTRAM) 50 mg tablet, Take 1 Tab by mouth every six (6) hours as needed for Pain. Max Daily Amount: 200 mg. Indications: Pain, Disp: 120 Tab, Rfl: 2    metoprolol succinate (TOPROL-XL) 100 mg tablet, Take 1 Tab by mouth daily. , Disp: 90 Tab, Rfl: 3    metroNIDAZOLE (METROGEL) 1 % topical gel, Apply 1 g to affected area daily. Use a thin layer to affected areas after washing, Disp: 60 g, Rfl: 5    lansoprazole (PREVACID) 30 mg capsule, Take 1 Cap by mouth Daily (before breakfast). , Disp: 90 Cap, Rfl: 3    lisinopril-hydroCHLOROthiazide (PRINZIDE, ZESTORETIC) 20-12.5 mg per tablet, Take 1 Tab by mouth daily. , Disp: 90 Tab, Rfl: 3    simvastatin (ZOCOR) 40 mg tablet, Take 1 Tab by mouth daily. , Disp: 90 Tab, Rfl: 3    Liraglutide (VICTOZA) 0.6 mg/0.1 mL (18 mg/3 mL) sub-q pen, 19mg/3ml pen         1.8mg per day  Indications: type 2 diabetes mellitus, Disp: 9 Syringe, Rfl: 3    doxycycline (ADOXA) 100 mg tablet, Take 1 Tab by mouth two (2) times a day. Indications: ACNE ROSACEA, Disp: 100 Tab, Rfl: 2    PEN NEEDLE, DIABETIC (NOVOFINE 32), 32 g by Does Not Apply route daily.  Indications: 32G x 6 MM,, Disp: , Rfl:    Date Last Reviewed:  9/29/2017     EXAMINATION:   Observation/Orthostatic Postural Assessment:          Standing:  · Left scap elevated   · B hips in slight flexion   · Decreased lumbar lordotic curve  · Left lumbar shift   Palpation:         Tender of mid lumbar region   ROM:            Lumbar spine Date:  8/9/17 Date:   Date:     Direction  Parameters Parameters Parameters   Flexion  25% cs     Extension  25%     Rotation  R: 25%  L: 15%     Side bending  R: 50%  L: 25% cs      Hip IR Very limited B      Hip ER Mild limitation B cs     Hip flexion  R: 90 degrees  L: 100 degrees cs     Cs= comparable sign (symptoms provoked)    Strength:            Lower quadrant    DATE  8/9/17 DATE     Hip flexion R: 4-  L: 4- R:   L:    Hip Abduction  R: 4-  L: 4- R:   L:    Hip Extension  R: --  L: -- R:   L:    Knee Flexion  R: 4  L: 4- R:   L:    Knee Extension  R: 4+  L: 4- R:   L:    Ankle Dorsiflexion  R: 4   L: 4 R:   L:   Ankle Plantarflexion  R: 4  L:4  R:  L:          Special Tests:          None tested   Neurological Screen:        Myotomes: weakness generalized, not specific to lumbar nerve roots Sensation: intact B LE         Reflexes: will test next visit   Functional Mobility:         Gait/Ambulation:  WBOS, ER B hips, increased lateral sway. Transfers:  Use of B UE sit to stand         Stairs:  Not tested    CLINICAL DECISION MAKING:   Outcome Measure: Tool Used: Modified Oswestry Low Back Pain Questionnaire  Score:  Initial: 38/50  Most Recent: X/50 (Date: -- )   Interpretation of Score: Each section is scored on a 0-5 scale, 5 representing the greatest disability. The scores of each section are added together for a total score of 50. Medical Necessity:   · Patient is expected to demonstrate progress in strength and range of motion to decrease assistance required with ADLs as well as work related activities. .  Reason for Services/Other Comments:  · Patient continues to require modification of therapeutic interventions to increase complexity of exercises. TREATMENT:   (In addition to Assessment/Re-Assessment sessions the following treatments were rendered)  Pre-treatment Symptoms/Complaints: pt states that he had an KAMALA 2 weeks ago and does not feel any change of his sx. He states that last week he had to go to the chiropractor because pain was increased. chiro said his left side was \"out\". After adjustment, decreased back pain per pt. He continues to have difficulty with putting on socks due to tightness of B hips. Pain: Initial:     5/10  Post Session:  3/10      THERAPEUTIC EXERCISE: (45 minutes):  Exercises per grid below to improve mobility, strength and coordination. Required moderate visual, verbal and manual cues to promote proper body alignment, promote proper body posture and promote proper body mechanics. Progressed resistance, range and repetitions as indicated.    8/23/17 8/25/17 8/30/17 9/1/17 9/5/17 9/11/17 9/29/17   Activity/Exercise          Education           LTR 5 x 10 sec holds 5 x 10 sec holds         nustep  X 10 min level 3  X 11 min level 3 X 11 min level 3  X12min; L3 X 13min; L 4 X 12 min L4 X 13 min level 4    TAs Used stabilizer x 25 min focused on TrA iso and sustained hold with no compensatory muscle activation    Standing position     Encouraged TrA engagement during standing exercises     Supine add sq w/ TAs  X 10,  5 sec         Supine TrA with mini march           Supine hip ABD w/ TAs  RTB 10, 5 sec        SKTC          HS/GS str          Anterior/posterior hip stretching        In seated position x 2, 10 sec each using pillow case for assist    bridge  X 6, 5 sec holds (difficult)        multif (opp heel and elbow into mat)--supine           Long axis distraction L LE          Side stepping   2 laps in hallway X 2 laps focusing on good lumbar and hip position  X 2 laps in huff X 4 laps in huff X 4 laps in huff  X 50 feet in all B leading with focus of decreasing ER especially left    Gait avoiding toe out    X 2 laps in huff X 4 laps in huff X 4 laps with weighted ball held at eye level to promote decreased lateral movement  X ~100 ft    Marching with high knees working on hip/core strength and SLS balance      x 4 laps in huff X 2 laps in huff     Sit-stand from mat at elevated height    X 15 working on avoiding ER for stabilization X 25  X 10 focusing on decreasing ER   Hip ER/IR  Supine knee extended x 10 B      Manually                        Manual 15 min: to decrease joint stiffness and soft tissue restriction    · MFR superficial techniques including skin rolling and skin gliding caudally and lateral (B) with pt positioned in prone with pillow under shins for unloading. · CPA's grade IV- of all levels lower t-spine, L-spine and sacrum  · L hip IR mobs in prone grade III   Modalities:  Heat application to gluteals to relax during above manual.     Treatment/Session Assessment:  significant tightness of B hips today demonstrated by ER B with gait.   Encouraged pt to focus on LE position during all functional activities to decrease tightness of B hips. Also discussed housework activities such as dishes and sweeping, instructing on ways to maintain neutral spine. H/o given to patient as f/u for magdaleno   · Response to Treatment: any activity stretching external rotators of hips increases low back pain. Several audible's of lumbar with minimal grades of mobilizations. No pain with cavitation. Pt reports feeling better after session. · Recommendations/Intent for next treatment session: Progress standing exercises and functional strength training as pt is able.   Reevaluate next week to assess for recert vs d/c    Future Appointments  Date Time Provider Jonas Corrie   10/4/2017 10:00 AM Tammie Mater, PT [facility]   10/6/2017 10:00 AM Tammie Mater, PT [facility]   10/9/2017 11:00 AM Tammie Mater, PT [facility]   10/9/2017 12:30 PM Aleah Roach MD [place] [id number]   10/11/2017 10:00 AM Tammie Mater, PT [facility]   10/11/2017 3:00 PM MD LUCILA Macdonald   10/13/2017 10:00 AM Tammie Mater, PT [facility]   10/16/2017 11:00 AM Tammie Mater, PT [facility]   10/18/2017 10:00 AM Tammie Mater, PT [facility]   10/20/2017 10:00 AM Tammie Mater, PT [facility]   10/23/2017 10:00 AM Estephanie Escobedo, PT [facility]   10/25/2017 10:00 AM Tammie Mater, PT [facility]   10/27/2017 10:00 AM Tammie Mater, PT [facility]   2/20/2018 8:10 AM [facility]   2/23/2018 3:30 PM MD LUCILA Macdonald       Total Treatment Duration:       Tammie Mater, PT

## 2017-10-04 ENCOUNTER — HOSPITAL ENCOUNTER (OUTPATIENT)
Dept: PHYSICAL THERAPY | Age: 57
Discharge: HOME | End: 2017-10-04
Payer: COMMERCIAL

## 2017-10-04 PROCEDURE — 97140 MANUAL THERAPY 1/> REGIONS: CPT

## 2017-10-04 PROCEDURE — 97110 THERAPEUTIC EXERCISES: CPT

## 2017-10-04 NOTE — PROGRESS NOTES
Genora Quivers  : 1960 [address] at [place] [address] [address] [facility], [facility], [place], [address] [place] [address]  [phone]   [phone]         OUTPATIENT PHYSICAL THERAPY:Daily Note 10/4/2017    ICD-10: Treatment Diagnosis: low back pain (M54.5)  Precautions/Allergies:   Review of patient's allergies indicates no known allergies. Fall Risk Score: 2 (? 5 = High Risk)  MD Orders: evaluate and treat MEDICAL/REFERRING DIAGNOSIS:  Low back pain [M54.5]   DATE OF ONSET: chronic   REFERRING PHYSICIAN: Danielle Hidalgo MD  RETURN PHYSICIAN APPOINTMENT: 10/9/17     INITIAL ASSESSMENT:  Mr. Aminata Rodriguez presents significant functional limitations due to back pain. PT evaluation reveals significant weakness of core and LE musculature, decreased functional AROM lumbar and B hip joints, altered spinal alignment, and poor pain management. Pt would highly benefit from skilled PT to address problems below and improve overall functional mobility. PROBLEM LIST (Impacting functional limitations):  1. Decreased Strength  2. Decreased ADL/Functional Activities  3. Increased Pain  4. Decreased Flexibility/Joint Mobility  5. Decreased [place] with Home Exercise Program INTERVENTIONS PLANNED:  1. Cold  2. Heat  3. Home Exercise Program (HEP)  4. Manual Therapy  5. Neuromuscular Re-education/Strengthening  6. Range of Motion (ROM)  7. Therapeutic Exercise/Strengthening   TREATMENT PLAN:  Effective Dates: 17 TO 10/9/17. Frequency/Duration: 3 times a week for 6 weeks  GOALS: (Goals have been discussed and agreed upon with patient.)  SHORT-TERM FUNCTIONAL GOALS: Time Frame: 2-3 weeks   1. Pt will be independent with HEP focusing on core stability and promoting good spinal alignment. 2. Pt will report no symptoms of LE for 1-2 weeks demonstrating centralization of symptoms. 3. Pt will report pain level does not exceed 5/10 in a 1-2 week period of time to demonstrate pain management.   4. Pt will report improved sleep with less disturbance from back pain. DISCHARGE GOALS: Time Frame: 4-6 weeks   1. Pt will improve Oswestry score by at least 10 points. 2. Pt will demonstrate increased AROM of lumbar spine all planes ~ 25% or greater without report of pain to improve functional mobility. 3. Pt will be able to sustain regular exercise routine including aerobic exercise 3+ times per week without increased symptoms to encourage healthy lifestyle. 4. Pt will demonstrate increased B LE strength to at least 4+/5 B to aid with functional mobility. Rehabilitation Potential For Stated Goals: Good  Regarding Delta Pace Hammond's therapy, I certify that the treatment plan above will be carried out by a therapist or under their direction. Thank you for this referral,  Jessica Velez, PT               The information in this section was collected on 8/7/17 (except where otherwise noted). HISTORY:   History of Present Injury/Illness (Reason for Referral):  Pt reports several years low back pain with a severe episode after lifting heavy objects in 2013 causing left low back L LE pain. Pt states that he has been receiving chiropractic treatments since then for pain management however, pain continued to bother him especially during/after work shifts at [employer]. He states that he is on his feet on concrete all day long and recently back pain interfering with job activities. In 3 months ago, pt reports he called off work due to pain too severe to perform work activities. Went to MD, imaging showing disc protrusion at L3 and stenosis at L1-2,  L5-S1. Referred to neuro specialist. Epidural injection ordered (not scheduled yet). PT referral with Neuro follow up in 4 weeks. Present symptoms (on day of initial evaluation): constant low back aching, mid to low back.   Occasional numbness of B LE (R>L)      · Aggravating factors: standing on hard surfaces 5 min, walking, stairs ambulation, bending   · Relieving factors: recliner, pain medication     · Pain level: 6/10 presently, 9/10 worst, 5/10 best     Past Medical History/Comorbidities:   Mr. Bee Miranda  has a past medical history of Allergic rhinitis due to allergen; Arthritis; Colon polyps (8/12/2016); Deficiency, lipoprotein (8/12/2016); Diabetes mellitus type 2, controlled ([facility] [address] (8/12/2016); Encounter for long-term (current) use of medications (8/12/2016); HLD (hyperlipidemia) (8/12/2016); HTN (hypertension) (8/12/2016); Obesity (8/12/2016); Other abnormal glucose (8/12/2016); Sinusitis, acute maxillary (8/12/2016); and URI (upper respiratory infection) (8/12/2016). Mr. Bee Miranda  has a past surgical history that includes tonsillectomy; adenoidectomy; and other surgical (2005). Social History/Living Environment:     lives with spouse   Prior Level of Function/Work/Activity:  Currently on disability   Dominant Side:         RIGHT  Other Clinical Tests:          MRI lumbar spine: (copied from radiology)   Disc desiccation and mild degenerative changes and facet arthropathy  throughout the lumbar spine. There is a significant central stenosis at L1-2;  the thecal sac measures 6 to 7 mm in AP diameter. At L4-3-4 there is a  broad-based left lateral disc protrusion impinging the enlarged L3 nerve root. Moderate foraminal stenoses at L5-S1  Previous Treatment Approaches:          Chiropractor   Current Medications:       Current Outpatient Prescriptions:     multivitamin (ONE A DAY) tablet, Take 1 Tab by mouth daily. , Disp: , Rfl:     Insulin Needles, Disposable, (NOVOFINE 32) 32 gauge x 1/4\" ndle, 1 misc daily. For Victoza pen, Disp: 100 Pen Needle, Rfl: 3    traMADol (ULTRAM) 50 mg tablet, Take 1 Tab by mouth every six (6) hours as needed for Pain. Max Daily Amount: 200 mg. Indications: Pain, Disp: 120 Tab, Rfl: 2    metoprolol succinate (TOPROL-XL) 100 mg tablet, Take 1 Tab by mouth daily. , Disp: 90 Tab, Rfl: 3    metroNIDAZOLE (METROGEL) 1 % topical gel, Apply 1 g to affected area daily. Use a thin layer to affected areas after washing, Disp: 60 g, Rfl: 5    lansoprazole (PREVACID) 30 mg capsule, Take 1 Cap by mouth Daily (before breakfast). , Disp: 90 Cap, Rfl: 3    lisinopril-hydroCHLOROthiazide (PRINZIDE, ZESTORETIC) 20-12.5 mg per tablet, Take 1 Tab by mouth daily. , Disp: 90 Tab, Rfl: 3    simvastatin (ZOCOR) 40 mg tablet, Take 1 Tab by mouth daily. , Disp: 90 Tab, Rfl: 3    Liraglutide (VICTOZA) 0.6 mg/0.1 mL (18 mg/3 mL) sub-q pen, 19mg/3ml pen         1.8mg per day  Indications: type 2 diabetes mellitus, Disp: 9 Syringe, Rfl: 3    doxycycline (ADOXA) 100 mg tablet, Take 1 Tab by mouth two (2) times a day. Indications: ACNE ROSACEA, Disp: 100 Tab, Rfl: 2    PEN NEEDLE, DIABETIC (NOVOFINE 32), 32 g by Does Not Apply route daily.  Indications: 32G x 6 MM,, Disp: , Rfl:    Date Last Reviewed:  10/4/2017     EXAMINATION:   Observation/Orthostatic Postural Assessment:          Standing:  · Left scap elevated   · B hips in slight flexion   · Decreased lumbar lordotic curve  · Left lumbar shift   Palpation:         Tender of mid lumbar region   ROM:            Lumbar spine Date:  8/9/17 Date:   Date:     Direction  Parameters Parameters Parameters   Flexion  25% cs     Extension  25%     Rotation  R: 25%  L: 15%     Side bending  R: 50%  L: 25% cs      Hip IR Very limited B      Hip ER Mild limitation B cs     Hip flexion  R: 90 degrees  L: 100 degrees cs     Cs= comparable sign (symptoms provoked)    Strength:            Lower quadrant    DATE  8/9/17 DATE     Hip flexion R: 4-  L: 4- R:   L:    Hip Abduction  R: 4-  L: 4- R:   L:    Hip Extension  R: --  L: -- R:   L:    Knee Flexion  R: 4  L: 4- R:   L:    Knee Extension  R: 4+  L: 4- R:   L:    Ankle Dorsiflexion  R: 4   L: 4 R:   L:   Ankle Plantarflexion  R: 4  L:4  R:  L:          Special Tests:          None tested   Neurological Screen:        Myotomes: weakness generalized, not specific to lumbar nerve roots Sensation: intact B LE         Reflexes: will test next visit   Functional Mobility:         Gait/Ambulation:  WBOS, ER B hips, increased lateral sway. Transfers:  Use of B UE sit to stand         Stairs:  Not tested    CLINICAL DECISION MAKING:   Outcome Measure: Tool Used: Modified Oswestry Low Back Pain Questionnaire  Score:  Initial: 38/50  Most Recent: X/50 (Date: -- )   Interpretation of Score: Each section is scored on a 0-5 scale, 5 representing the greatest disability. The scores of each section are added together for a total score of 50. Medical Necessity:   · Patient is expected to demonstrate progress in strength and range of motion to decrease assistance required with ADLs as well as work related activities. .  Reason for Services/Other Comments:  · Patient continues to require modification of therapeutic interventions to increase complexity of exercises. TREATMENT:   (In addition to Assessment/Re-Assessment sessions the following treatments were rendered)  Pre-treatment Symptoms/Complaints: pt states that he is really sore of low back and gluteals today. Pain: Initial:     5/10  Post Session:  3/10      THERAPEUTIC EXERCISE: (20 minutes):  Exercises per grid below to improve mobility, strength and coordination. Required moderate visual, verbal and manual cues to promote proper body alignment, promote proper body posture and promote proper body mechanics. Progressed resistance, range and repetitions as indicated.    8/25/17 8/30/17 9/1/17 9/5/17 9/11/17 9/29/17 10/4/17   Activity/Exercise          Education           LTR 5 x 10 sec holds          nustep  X 11 min level 3 X 11 min level 3  X12min; L3 X 13min; L 4 X 12 min L4 X 13 min level 4  X 12 min level 3    TAs Standing position     Encouraged TrA engagement during standing exercises      Supine add sq w/ TAs X 10,  5 sec          Supine TrA with mini march           Supine hip ABD w/ TAs RTB 10, 5 sec         SKTC HS/GS str          Anterior/posterior hip stretching       In seated position x 2, 10 sec each using pillow case for assist     bridge X 6, 5 sec holds (difficult)         multif (opp heel and elbow into mat)--supine           Long axis distraction L LE       Manual    Side stepping  2 laps in hallway X 2 laps focusing on good lumbar and hip position  X 2 laps in huff X 4 laps in huff X 4 laps in huff  X 50 feet in all B leading with focus of decreasing ER especially left     Gait avoiding toe out   X 2 laps in huff X 4 laps in huff X 4 laps with weighted ball held at eye level to promote decreased lateral movement  X ~100 ft  X 100 ft   Marching with high knees working on hip/core strength and SLS balance     x 4 laps in huff X 2 laps in huff      Sit-stand from mat at elevated height   X 15 working on avoiding ER for stabilization X 25  X 10 focusing on decreasing ER    Hip ER/IR Supine knee extended x 10 B      Manually  Manually   Prone ham curls       X 10              Manual 40 min: to decrease joint stiffness and soft tissue restriction    · MFR superficial techniques including skin rolling and skin gliding caudally and lateral (B) with pt positioned in prone with pillow under shins for unloading. · Deep tissue mobs to left gluteals  · CPA's grade IV- of all levels lower t-spine, L-spine and sacrum  · L hip IR mobs in prone grade III   · Contract relax technique to decrease tension of left iliopsoas  ·   Modalities:  Heat application to gluteals to relax during above manual.     Treatment/Session Assessment:  significant tightness of left hip today. Reminded patient to avoid sitting as much as possible, encouraging walking and laying down positions to decrease hip flexor tightness. · Response to Treatment: decreased MF tightness of low back and gluteals. Decreased pain overall. Gait pattern improved as departed with increased lumbar and thoracic rotation and neutral hip position. · Recommendations/Intent for next treatment session: Progress standing exercises and functional strength training as pt is able.   Reevaluate next week to assess for recert vs d/c    Future Appointments  Date Time Provider Jonas Corrie   10/6/2017 10:00 AM [place] Ch[place], PT [facility]   10/9/2017 11:00 AM [place] Chimes, PT [facility]   10/9/2017 12:30 PM Dominic Villalobos MD [place] [id number]   10/11/2017 10:00 AM [place] Chimes, PT [facility]   10/11/2017 3:00 PM MD LUCILA Moirn   10/13/2017 10:00 AM [place] Chimes, PT [facility]   10/16/2017 11:00 AM [place] Chimes, PT [facility]   10/18/2017 10:00 AM [place] Chimes, PT [facility]   10/20/2017 10:00 AM [place] Chimes, PT [facility]   10/23/2017 10:00 AM Deena Shaheening, PT [facility]   10/25/2017 10:00 AM [place] Chimes, PT [facility]   10/27/2017 10:00 AM [place] Chimes, PT [facility]   2/20/2018 8:10 AM [facility]   2/23/2018 3:30 PM MD LUCILA Morin       Total Treatment Duration:  PT Patient Time In/Time Out  Time In: 1000  Time Out: [address] [facility], PT

## 2017-10-06 ENCOUNTER — HOSPITAL ENCOUNTER (OUTPATIENT)
Dept: PHYSICAL THERAPY | Age: 57
Discharge: HOME | End: 2017-10-06
Payer: COMMERCIAL

## 2017-10-06 PROCEDURE — 97110 THERAPEUTIC EXERCISES: CPT

## 2017-10-06 PROCEDURE — 97140 MANUAL THERAPY 1/> REGIONS: CPT

## 2017-10-06 NOTE — PROGRESS NOTES
Briseida Cowan  : 1960 [address], [address] [place] [address]  [phone]   [phone]         OUTPATIENT PHYSICAL THERAPY:Daily Note 10/6/2017    ICD-10: Treatment Diagnosis: low back pain (M54.5)  Precautions/Allergies:   Review of patient's allergies indicates no known allergies. Fall Risk Score: 2 (? 5 = High Risk)  MD Orders: evaluate and treat MEDICAL/REFERRING DIAGNOSIS:  Low back pain [M54.5]   DATE OF ONSET: chronic   REFERRING PHYSICIAN: Gonzalez Dan MD  RETURN PHYSICIAN APPOINTMENT: 10/9/17     INITIAL ASSESSMENT:  Mr. Esther Ford presents significant functional limitations due to back pain. PT evaluation reveals significant weakness of core and LE musculature, decreased functional AROM lumbar and B hip joints, altered spinal alignment, and poor pain management. Pt would highly benefit from skilled PT to address problems below and improve overall functional mobility. PROBLEM LIST (Impacting functional limitations):  1. Decreased Strength  2. Decreased ADL/Functional Activities  3. Increased Pain  4. Decreased Flexibility/Joint Mobility  5. Decreased [place] with Home Exercise Program INTERVENTIONS PLANNED:  1. Cold  2. Heat  3. Home Exercise Program (HEP)  4. Manual Therapy  5. Neuromuscular Re-education/Strengthening  6. Range of Motion (ROM)  7. Therapeutic Exercise/Strengthening   TREATMENT PLAN:  Effective Dates: 17 TO 10/9/17. Frequency/Duration: 3 times a week for 6 weeks  GOALS: (Goals have been discussed and agreed upon with patient.)  SHORT-TERM FUNCTIONAL GOALS: Time Frame: 2-3 weeks   1. Pt will be independent with HEP focusing on core stability and promoting good spinal alignment. 2. Pt will report no symptoms of LE for 1-2 weeks demonstrating centralization of symptoms. 3. Pt will report pain level does not exceed 5/10 in a 1-2 week period of time to demonstrate pain management.   4. Pt will report improved sleep with less disturbance from back pain. DISCHARGE GOALS: Time Frame: 4-6 weeks   1. Pt will improve Oswestry score by at least 10 points. 2. Pt will demonstrate increased AROM of lumbar spine all planes ~ 25% or greater without report of pain to improve functional mobility. 3. Pt will be able to sustain regular exercise routine including aerobic exercise 3+ times per week without increased symptoms to encourage healthy lifestyle. 4. Pt will demonstrate increased B LE strength to at least 4+/5 B to aid with functional mobility. Rehabilitation Potential For Stated Goals: Good  Regarding Harmony Hammond's therapy, I certify that the treatment plan above will be carried out by a therapist or under their direction. Thank you for this referral,  Miguel Said, PT               The information in this section was collected on 8/7/17 (except where otherwise noted). HISTORY:   History of Present Injury/Illness (Reason for Referral):  Pt reports several years low back pain with a severe episode after lifting heavy objects in 2013 causing left low back L LE pain. Pt states that he has been receiving chiropractic treatments since then for pain management however, pain continued to bother him especially during/after work shifts at [employer]. He states that he is on his feet on concrete all day long and recently back pain interfering with job activities. In 3 months ago, pt reports he called off work due to pain too severe to perform work activities. Went to [place], imaging showing disc protrusion at L3 and stenosis at L1-2,  L5-S1. Referred to neuro specialist. Epidural injection ordered (not scheduled yet). PT referral with Neuro follow up in 4 weeks. Present symptoms (on day of initial evaluation): constant low back aching, mid to low back.   Occasional numbness of B LE (R>L)      · Aggravating factors: standing on hard surfaces 5 min, walking, stairs ambulation, bending   · Relieving factors: recliner, pain medication     · Pain level: 6/10 presently, 9/10 worst, 5/10 best     Past Medical History/Comorbidities:   Mr. Edgardo Meneses  has a past medical history of Allergic rhinitis due to allergen; Arthritis; Colon polyps (8/12/2016); Deficiency, lipoprotein (8/12/2016); Diabetes mellitus type 2, controlled ([facility] [address] (8/12/2016); Encounter for long-term (current) use of medications (8/12/2016); HLD (hyperlipidemia) (8/12/2016); HTN (hypertension) (8/12/2016); Obesity (8/12/2016); Other abnormal glucose (8/12/2016); Sinusitis, acute maxillary (8/12/2016); and URI (upper respiratory infection) (8/12/2016). Mr. Edgardo Meneses  has a past surgical history that includes tonsillectomy; adenoidectomy; and other surgical (2005). Social History/Living Environment:     lives with spouse   Prior Level of Function/Work/Activity:  Currently on disability   Dominant Side:         RIGHT  Other Clinical Tests:          MRI lumbar spine: (copied from radiology)   Disc desiccation and mild degenerative changes and facet arthropathy  throughout the lumbar spine. There is a significant central stenosis at L1-2;  the thecal sac measures 6 to 7 mm in AP diameter. At L4-3-4 there is a  broad-based left lateral disc protrusion impinging the enlarged L3 nerve root. Moderate foraminal stenoses at L5-S1  Previous Treatment Approaches:          Chiropractor   Current Medications:       Current Outpatient Prescriptions:     multivitamin (ONE A DAY) tablet, Take 1 Tab by mouth daily. , Disp: , Rfl:     Insulin Needles, Disposable, (NOVOFINE 32) 32 gauge x 1/4\" ndle, 1 misc daily. For Victoza pen, Disp: 100 Pen Needle, Rfl: 3    traMADol (ULTRAM) 50 mg tablet, Take 1 Tab by mouth every six (6) hours as needed for Pain. Max Daily Amount: 200 mg. Indications: Pain, Disp: 120 Tab, Rfl: 2    metoprolol succinate (TOPROL-XL) 100 mg tablet, Take 1 Tab by mouth daily. , Disp: 90 Tab, Rfl: 3    metroNIDAZOLE (METROGEL) 1 % topical gel, Apply 1 g to affected area daily. Use a thin layer to affected areas after washing, Disp: 60 g, Rfl: 5    lansoprazole (PREVACID) 30 mg capsule, Take 1 Cap by mouth Daily (before breakfast). , Disp: 90 Cap, Rfl: 3    lisinopril-hydroCHLOROthiazide (PRINZIDE, ZESTORETIC) 20-12.5 mg per tablet, Take 1 Tab by mouth daily. , Disp: 90 Tab, Rfl: 3    simvastatin (ZOCOR) 40 mg tablet, Take 1 Tab by mouth daily. , Disp: 90 Tab, Rfl: 3    Liraglutide (VICTOZA) 0.6 mg/0.1 mL (18 mg/3 mL) sub-q pen, 19mg/3ml pen         1.8mg per day  Indications: type 2 diabetes mellitus, Disp: 9 Syringe, Rfl: 3    doxycycline (ADOXA) 100 mg tablet, Take 1 Tab by mouth two (2) times a day. Indications: ACNE ROSACEA, Disp: 100 Tab, Rfl: 2    PEN NEEDLE, DIABETIC (NOVOFINE 32), 32 g by Does Not Apply route daily.  Indications: 32G x 6 MM,, Disp: , Rfl:    Date Last Reviewed:  10/6/2017     EXAMINATION:   Observation/Orthostatic Postural Assessment:          Standing:  · Left scap elevated   · B hips in slight flexion   · Decreased lumbar lordotic curve  · Left lumbar shift   Palpation:         Tender of mid lumbar region   ROM:            Lumbar spine Date:  8/9/17 Date:   Date:     Direction  Parameters Parameters Parameters   Flexion  25% cs     Extension  25%     Rotation  R: 25%  L: 15%     Side bending  R: 50%  L: 25% cs      Hip IR Very limited B      Hip ER Mild limitation B cs     Hip flexion  R: 90 degrees  L: 100 degrees cs     Cs= comparable sign (symptoms provoked)    Strength:            Lower quadrant    DATE  8/9/17 DATE     Hip flexion R: 4-  L: 4- R:   L:    Hip Abduction  R: 4-  L: 4- R:   L:    Hip Extension  R: --  L: -- R:   L:    Knee Flexion  R: 4  L: 4- R:   L:    Knee Extension  R: 4+  L: 4- R:   L:    Ankle Dorsiflexion  R: 4   L: 4 R:   L:   Ankle Plantarflexion  R: 4  L:4  R:  L:          Special Tests:          None tested   Neurological Screen:        Myotomes: weakness generalized, not specific to lumbar nerve roots Sensation: intact B LE         Reflexes: will test next visit   Functional Mobility:         Gait/Ambulation:  WBOS, ER B hips, increased lateral sway. Transfers:  Use of B UE sit to stand         Stairs:  Not tested    CLINICAL DECISION MAKING:   Outcome Measure: Tool Used: Modified Oswestry Low Back Pain Questionnaire  Score:  Initial: 38/50  Most Recent: X/50 (Date: -- )   Interpretation of Score: Each section is scored on a 0-5 scale, 5 representing the greatest disability. The scores of each section are added together for a total score of 50. Medical Necessity:   · Patient is expected to demonstrate progress in strength and range of motion to decrease assistance required with ADLs as well as work related activities. .  Reason for Services/Other Comments:  · Patient continues to require modification of therapeutic interventions to increase complexity of exercises. TREATMENT:   (In addition to Assessment/Re-Assessment sessions the following treatments were rendered)  Pre-treatment Symptoms/Complaints: pt states that his groin and low back is very sore today. He states that a few hours after last visit, he was extremely sore of low back. Pain: Initial:     6-7/10  Post Session:  6/10      THERAPEUTIC EXERCISE: (25 minutes):  Exercises per grid below to improve mobility, strength and coordination. Required moderate visual, verbal and manual cues to promote proper body alignment, promote proper body posture and promote proper body mechanics. Progressed resistance, range and repetitions as indicated.    8/30/17 9/1/17 9/5/17 9/11/17 9/29/17 10/4/17 10/6/17   Activity/Exercise          Education           LTR          nustep  X 11 min level 3  X12min; L3 X 13min; L 4 X 12 min L4 X 13 min level 4  X 12 min level 3  X 10 min level 3    TAs    Encouraged TrA engagement during standing exercises       Supine add sq w/ TAs          Supine TrA with mini march           Supine hip ABD w/ TAs SKTC          HS/GS str          Anterior/posterior hip stretching      In seated position x 2, 10 sec each using pillow case for assist      bridge          multif (opp heel and elbow into mat)--supine           Long axis distraction L LE      Manual     Side stepping  X 2 laps focusing on good lumbar and hip position  X 2 laps in huff X 4 laps in huff X 4 laps in huff  X 50 feet in all B leading with focus of decreasing ER especially left   X 60ft    Gait avoiding toe out  X 2 laps in huff X 4 laps in huff X 4 laps with weighted ball held at eye level to promote decreased lateral movement  X ~100 ft  X 100 ft X 60 ft   Marching with high knees working on hip/core strength and SLS balance    x 4 laps in huff X 2 laps in huff    With agility ladder x 5 laps    Sit-stand from mat at elevated height  X 15 working on avoiding ER for stabilization X 25  X 10 focusing on decreasing ER     Hip ER/IR     Manually  Manually Manually    Prone ham curls      X 10               Manual 30 min: to decrease joint stiffness and soft tissue restriction    · MFR superficial techniques including skin rolling and skin gliding caudally and lateral (B) with pt positioned in prone with pillow under shins for unloading. · Deep tissue mobs to left gluteals  · CPA's grade IV- of all levels lower t-spine, L-spine and sacrum  · L hip IR mobs in prone grade III   · Contract relax technique to decrease tension of left iliopsoas  ·   Modalities:  Heat application to gluteals to relax during above manual.     Treatment/Session Assessment: decreased time of manual today due to soreness after last visit. · Response to Treatment: decreased MF tightness of low back and gluteals. No change of pain level but gait pattern improved as departed with increased lumbar and thoracic rotation and neutral hip position.         · Recommendations/Intent for next treatment session: Progress standing exercises and functional strength training as pt is able.  Reevaluate next week to assess for recert vs d/c    Future Appointments  Date Time Provider [facility]   10/9/2017 12:30 PM [place] [id number]   10/11/2017 10:00 AM Phineas Dense, PT [facility]   10/11/2017 3:00 PM MD LUCILA Cristobal   10/13/2017 10:00 AM Phineas Dense, PT [facility]   10/16/2017 11:00 AM Phineas Dense, PT [facility]   10/18/2017 10:00 AM Phineas Dense, PT [facility]   10/20/2017 10:00 AM Phineas Dense, PT [facility]   10/23/2017 10:00 AM Tenisha Blade, PT [facility]   10/25/2017 10:00 AM Phineas Dense, PT [facility]   10/27/2017 10:00 AM Phineas Dense, PT [facility]   2/20/2018 8:10 AM [facility]   2/23/2018 3:30 PM MD LUCILA Cristobal       Total Treatment Duration:  PT Patient Time In/Time Out  Time In: 1000  Time Out: [address], PT

## 2017-10-09 ENCOUNTER — APPOINTMENT (OUTPATIENT)
Dept: PHYSICAL THERAPY | Age: 57
End: 2017-10-09
Payer: COMMERCIAL

## 2017-10-11 ENCOUNTER — HOSPITAL ENCOUNTER (OUTPATIENT)
Dept: PHYSICAL THERAPY | Age: 57
Discharge: HOME | End: 2017-10-11
Payer: COMMERCIAL

## 2017-10-11 PROCEDURE — 97110 THERAPEUTIC EXERCISES: CPT

## 2017-10-11 PROCEDURE — 97164 PT RE-EVAL EST PLAN CARE: CPT

## 2017-10-11 NOTE — PROGRESS NOTES
Genora Quivers  : 1960 [address] at [place] [address] [address] \[facility] [place] [facility]", [facility], [address] [place] [address] Road  [phone]   [phone]         OUTPATIENT PHYSICAL THERAPY:Daily Note and Recertification 1333    ICD-10: Treatment Diagnosis: low back pain (M54.5)  Precautions/Allergies:   Review of patient's allergies indicates no known allergies. Fall Risk Score: 2 (? 5 = High Risk)  MD Orders: evaluate and treat MEDICAL/REFERRING DIAGNOSIS:  Low back pain [M54.5]   DATE OF ONSET: chronic   REFERRING PHYSICIAN: Danielle Hidalgo MD  RETURN PHYSICIAN APPOINTMENT: 10/9/17     INITIAL ASSESSMENT:  Mr. Aminata Rodriguez has attended 13 scheduled PT visits treating low back pain. Overall, he has made slow progress with mobility and strength, however, pain continues to be consistently moderate with occasional exacerbations of low back and left hip. Some symptoms suggested hip pathology as well. He has been educated on body mechanics with house work activities and as shown independence with his HEP and reports compliance with performing at home. Due to left hip and low back pain during weightbearing activates, the ability to progress patient to more functional activities is limited. He would benefit from continued skilled PT 3 x per week to continued working toward d/c goals and return to work, with the addition of aquatic therapy as one of those 3 sessions each week to work on more functional core and hip strength/educrance until able to perform on land. PROBLEM LIST (Impacting functional limitations):  1. Decreased Strength  2. Decreased ADL/Functional Activities  3. Increased Pain  4. Decreased Flexibility/Joint Mobility  5. Decreased [place] with Home Exercise Program INTERVENTIONS PLANNED:  1. Cold  2. Heat  3. Home Exercise Program (HEP)  4. Manual Therapy  5. Neuromuscular Re-education/Strengthening  6. Range of Motion (ROM)  7.  Therapeutic Exercise/Strengthening 8. Aquatic therapy    TREATMENT PLAN:  Effective Dates: 10/9/17 TO 12/9/17. Frequency/Duration: 3 times a week for 6 weeks  GOALS: (Goals have been discussed and agreed upon with patient.)  SHORT-TERM FUNCTIONAL GOALS: Time Frame: 2-3 weeks   1. Pt will be independent with HEP focusing on core stability and promoting good spinal alignment. (met)  2. Pt will report no symptoms of LE for 1-2 weeks demonstrating centralization of symptoms. (met)  3. Pt will report pain level does not exceed 5/10 in a 1-2 week period of time to demonstrate pain management. (ongoing)   4. Pt will report improved sleep with less disturbance from back pain. (not met)  DISCHARGE GOALS: Time Frame: 4-6 weeks   1. Pt will improve Oswestry score by at least 10 points. (ongoing)   2. Pt will demonstrate increased AROM of lumbar spine all planes ~ 25% or greater without report of pain to improve functional mobility. (not met)  3. Pt will be able to sustain regular exercise routine including aerobic exercise 3+ times per week without increased symptoms to encourage healthy lifestyle. (not met)   4. Pt will demonstrate increased B LE strength to at least 4+/5 B to aid with functional mobility. (not met)     Rehabilitation Potential For Stated Goals: Good    Physician Response:   Current Plan of Care    _x__ Continue treatment as above adding aquatic therapy    ___ Discontinue treatment          ______________________________     Referring Physician Signature: Harlan Park MD          Date                                The information in this section was collected on 8/7/17 (except where otherwise noted). HISTORY:   History of Present Injury/Illness (Reason for Referral):  Pt reports several years low back pain with a severe episode after lifting heavy objects in 2013 causing left low back L LE pain.   Pt states that he has been receiving chiropractic treatments since then for pain management however, pain continued to bother him especially during/after work shifts at [employer]. He states that he is on his feet on concrete all day long and recently back pain interfering with job activities. In 3 months ago, pt reports he called off work due to pain too severe to perform work activities. Went to MD, imaging showing disc protrusion at L3 and stenosis at L1-2,  L5-S1. Referred to neuro specialist. Epidural injection ordered (not scheduled yet). PT referral with Neuro follow up in 4 weeks. Present symptoms (on day of initial evaluation): constant low back aching, mid to low back. Occasional numbness of B LE (R>L)      · Aggravating factors: standing on hard surfaces 5 min, walking, stairs ambulation, bending   · Relieving factors: recliner, pain medication     · Pain level: 6/10 presently, 9/10 worst, 5/10 best     Past Medical History/Comorbidities: Mr. Claribel Salazar  has a past medical history of Allergic rhinitis due to allergen; Arthritis; Colon polyps (8/12/2016); Deficiency, lipoprotein (8/12/2016); Diabetes mellitus type 2, controlled ([facility] [address] (8/12/2016); Encounter for long-term (current) use of medications (8/12/2016); HLD (hyperlipidemia) (8/12/2016); HTN (hypertension) (8/12/2016); Obesity (8/12/2016); Other abnormal glucose (8/12/2016); Sinusitis, acute maxillary (8/12/2016); and URI (upper respiratory infection) (8/12/2016). Mr. Claribel Salazar  has a past surgical history that includes tonsillectomy; adenoidectomy; and other surgical (2005). Social History/Living Environment:     lives with spouse   Prior Level of Function/Work/Activity:  Currently on disability   Dominant Side:         RIGHT  Other Clinical Tests:          MRI lumbar spine: (copied from radiology)   Disc desiccation and mild degenerative changes and facet arthropathy  throughout the lumbar spine. There is a significant central stenosis at L1-2;  the thecal sac measures 6 to 7 mm in AP diameter.  At L4-3-4 there is a  broad-based left lateral disc protrusion impinging the enlarged L3 nerve root. Moderate foraminal stenoses at L5-S1  Previous Treatment Approaches:          Chiropractor   Current Medications:       Current Outpatient Prescriptions:     multivitamin (ONE A DAY) tablet, Take 1 Tab by mouth daily. , Disp: , Rfl:     Insulin Needles, Disposable, (NOVOFINE 32) 32 gauge x 1/4\" ndle, 1 misc daily. For Victoza pen, Disp: 100 Pen Needle, Rfl: 3    traMADol (ULTRAM) 50 mg tablet, Take 1 Tab by mouth every six (6) hours as needed for Pain. Max Daily Amount: 200 mg. Indications: Pain, Disp: 120 Tab, Rfl: 2    metoprolol succinate (TOPROL-XL) 100 mg tablet, Take 1 Tab by mouth daily. , Disp: 90 Tab, Rfl: 3    metroNIDAZOLE (METROGEL) 1 % topical gel, Apply 1 g to affected area daily. Use a thin layer to affected areas after washing, Disp: 60 g, Rfl: 5    lansoprazole (PREVACID) 30 mg capsule, Take 1 Cap by mouth Daily (before breakfast). , Disp: 90 Cap, Rfl: 3    lisinopril-hydroCHLOROthiazide (PRINZIDE, ZESTORETIC) 20-12.5 mg per tablet, Take 1 Tab by mouth daily. , Disp: 90 Tab, Rfl: 3    simvastatin (ZOCOR) 40 mg tablet, Take 1 Tab by mouth daily. , Disp: 90 Tab, Rfl: 3    Liraglutide (VICTOZA) 0.6 mg/0.1 mL (18 mg/3 mL) sub-q pen, 19mg/3ml pen         1.8mg per day  Indications: type 2 diabetes mellitus, Disp: 9 Syringe, Rfl: 3    doxycycline (ADOXA) 100 mg tablet, Take 1 Tab by mouth two (2) times a day. Indications: ACNE ROSACEA, Disp: 100 Tab, Rfl: 2    PEN NEEDLE, DIABETIC (NOVOFINE 32), 32 g by Does Not Apply route daily.  Indications: 32G x 6 MM,, Disp: , Rfl:    Date Last Reviewed:  10/11/2017     EXAMINATION: 10/11/17   Observation/Orthostatic Postural Assessment:          Standing:  · Left scap elevated   · B hips in slight flexion   · Decreased lumbar lordotic curve  · Left lumbar shift   Palpation:         Tender of mid lumbar region   ROM:            Lumbar spine Date:  8/9/17 Date:  10/11/17 Date:     Direction  Parameters Parameters Parameters   Flexion  25% cs 75% Extension  25% 75%    Rotation  R: 25%  L: 15% R: 50%  L: 25%    Side bending  R: 50%  L: 25% cs  R: 50%  L: 50%    Hip IR Very limited B  Mild-mod limitation     Hip ER Mild limitation B cs Mild limitation cs    Hip flexion  R: 90 degrees  L: 100 degrees cs R: 90 deg  L: 100 deg cs    Cs= comparable sign (symptoms provoked)    Strength:            Lower quadrant    DATE  8/9/17 DATE  10/11/17   Hip flexion R: 4-  L: 4- R: 4 +  L: 4 cs   Hip Abduction  R: 4-  L: 4- R: 4  L: 4   Hip Extension  R: --  L: -- R: --  L: --   Knee Flexion  R: 4  L: 4- R: 4+  L: 4+   Knee Extension  R: 4+  L: 4- R: 4+  L:  4+   Ankle Dorsiflexion  R: 4   L: 4 R: 4+  L: 4+   Ankle Plantarflexion  R: 4  L:4  R: 4+  L: 4+          Special Tests:          None tested   Neurological Screen:        Myotomes: intact         Sensation: intact B LE    Functional Mobility:         Gait/Ambulation:  WBOS, ER B hips, increased lateral sway, reduces significantly with verbal cuing each visit- no carry over          Transfers:  Able to sit to stand 5/5 times without use of UE         Stairs:  Not tested    CLINICAL DECISION MAKING:   Outcome Measure: Tool Used: Modified Oswestry Low Back Pain Questionnaire  Score:  Initial: 38/50  Most Recent: 37/50 (Date: 10/11/17 )   Interpretation of Score: Each section is scored on a 0-5 scale, 5 representing the greatest disability. The scores of each section are added together for a total score of 50. Medical Necessity:   · Patient is expected to demonstrate progress in strength and range of motion to decrease assistance required with ADLs as well as work related activities. .  Reason for Services/Other Comments:  · Patient continues to require modification of therapeutic interventions to increase complexity of exercises. TREATMENT:   (In addition to Assessment/Re-Assessment sessions the following treatments were rendered)  Pre-treatment Symptoms/Complaints: pt went to beach over last weekend. Long drive, had to get out and stretch each hour. Increased low back pain during time there due to car ride. pt states that he went to see Dr. Karen Johnson for a follow up visit 2 days ago, MD is going to keep pt out of work for another 6 weeks and would like to continue PT during that time. Pt reports that he feels pain is not reducing with PT but getting stronger. Pain: Initial: left sided low back pain     7/10  Post Session:  7/10      THERAPEUTIC EXERCISE: (15 minutes):  Exercises per grid below to improve mobility, strength and coordination. Required moderate visual, verbal and manual cues to promote proper body alignment, promote proper body posture and promote proper body mechanics. Progressed resistance, range and repetitions as indicated.    9/1/17 9/5/17 9/11/17 9/29/17 10/4/17 10/6/17 10/11/17   Activity/Exercise          Education           LTR          nustep  X12min; L3 X 13min; L 4 X 12 min L4 X 13 min level 4  X 12 min level 3  X 10 min level 3  X 10 min level 3   TAs   Encouraged TrA engagement during standing exercises        Supine add sq w/ TAs          Supine TrA with mini march           Supine hip ABD w/ TAs          SKTC          HS/GS str          Anterior/posterior hip stretching     In seated position x 2, 10 sec each using pillow case for assist       bridge          multif (opp heel and elbow into mat)--supine           Long axis distraction L LE     Manual      Side stepping  X 2 laps in huff X 4 laps in huff X 4 laps in huff  X 50 feet in all B leading with focus of decreasing ER especially left   X 60ft     Gait avoiding toe out X 2 laps in huff X 4 laps in huff X 4 laps with weighted ball held at eye level to promote decreased lateral movement  X ~100 ft  X 100 ft X 60 ft X 60 ft    Marching with high knees working on hip/core strength and SLS balance   x 4 laps in huff X 2 laps in huff    With agility ladder x 5 laps     Sit-stand from mat at elevated height X 15 working on avoiding ER for stabilization X 25  X 10 focusing on decreasing ER      Hip ER/IR    Manually  Manually Manually  X 5 seated    Prone ham curls     X 10                Manual 0 min: to decrease joint stiffness and soft tissue restriction    · MFR superficial techniques including skin rolling and skin gliding caudally and lateral (B) with pt positioned in prone with pillow under shins for unloading. · Deep tissue mobs to left gluteals  · CPA's grade IV- of all levels lower t-spine, L-spine and sacrum  · L hip IR mobs in prone grade III   · Contract relax technique to decrease tension of left iliopsoas  ·   Modalities:  Heat application to gluteals to relax during above manual.     Treatment/Session Assessment: decreased time of manual today due to soreness after last visit. · Response to Treatment: decreased MF tightness of low back and gluteals. No change of pain level but gait pattern improved as departed with increased lumbar and thoracic rotation and neutral hip position. · Recommendations/Intent for next treatment session: Progress standing exercises and functional strength training as pt is able.       Future Appointments  Date Time Provider Jonas [place]   10/12/2017 10:00 AM Lindare Beau, PT [facility] [place] [facility]   10/16/2017 11:00 AM Elinore Bump, PT [facility]   10/18/2017 10:00 AM Elinore Bump, PT [facility]   10/20/2017 10:00 AM Elinore Bump, PT [facility]   10/23/2017 10:00 AM Suzanna James, PT [facility]   10/25/2017 10:00 AM Elinore Bump, PT [facility]   10/25/2017 11:00 AM MD LUCILA Olvera   10/27/2017 10:00 AM Lindare Beau, PT [facility]   2/20/2018 8:10 AM [facility]   2/23/2018 3:30 PM MD LUCILA Olvera       Total Treatment Duration:  PT Patient Time In/Time Out  Time In: 1000  Time Out: 1800 oLyfe, PT

## 2017-10-12 ENCOUNTER — HOSPITAL ENCOUNTER (OUTPATIENT)
Dept: PHYSICAL THERAPY | Age: 57
Discharge: HOME | End: 2017-10-12
Payer: COMMERCIAL

## 2017-10-12 PROCEDURE — 97140 MANUAL THERAPY 1/> REGIONS: CPT

## 2017-10-12 PROCEDURE — 97110 THERAPEUTIC EXERCISES: CPT

## 2017-10-12 NOTE — PROGRESS NOTES
Briseida Cowan  : 1960 [address] [facility] at [address], [address] [place] [address], [address]  [phone]   [phone]         OUTPATIENT PHYSICAL THERAPY:Daily Note 10/12/2017    ICD-10: Treatment Diagnosis: low back pain (M54.5)  Precautions/Allergies:   Review of patient's allergies indicates no known allergies. Fall Risk Score: 2 (? 5 = High Risk)  MD Orders: evaluate and treat MEDICAL/REFERRING DIAGNOSIS:  Low back pain [M54.5]   DATE OF ONSET: chronic   REFERRING PHYSICIAN: Gonzalez Dan MD  RETURN PHYSICIAN APPOINTMENT: 10/9/17     INITIAL ASSESSMENT:  Mr. Esther Ford has attended 13 scheduled PT visits treating low back pain. Overall, he has made slow progress with mobility and strength, however, pain continues to be consistently moderate with occasional exacerbations of low back and left hip. Some symptoms suggested hip pathology as well. He has been educated on body mechanics with house work activities and as shown independence with his HEP and reports compliance with performing at home. Due to left hip and low back pain during weightbearing activates, the ability to progress patient to more functional activities is limited. He would benefit from continued skilled PT 3 x per week to continued working toward d/c goals and return to work, with the addition of aquatic therapy as one of those 3 sessions each week to work on more functional core and hip strength/educrance until able to perform on land. PROBLEM LIST (Impacting functional limitations):  1. Decreased Strength  2. Decreased ADL/Functional Activities  3. Increased Pain  4. Decreased Flexibility/Joint Mobility  5. Decreased [place] with Home Exercise Program INTERVENTIONS PLANNED:  1. Cold  2. Heat  3. Home Exercise Program (HEP)  4. Manual Therapy  5. Neuromuscular Re-education/Strengthening  6. Range of Motion (ROM)  7. Therapeutic Exercise/Strengthening   8.  Aquatic therapy TREATMENT PLAN:  Effective Dates: 10/9/17 TO 12/9/17. Frequency/Duration: 3 times a week for 6 weeks  GOALS: (Goals have been discussed and agreed upon with patient.)  SHORT-TERM FUNCTIONAL GOALS: Time Frame: 2-3 weeks   1. Pt will be independent with HEP focusing on core stability and promoting good spinal alignment. (met)  2. Pt will report no symptoms of LE for 1-2 weeks demonstrating centralization of symptoms. (met)  3. Pt will report pain level does not exceed 5/10 in a 1-2 week period of time to demonstrate pain management. (ongoing)   4. Pt will report improved sleep with less disturbance from back pain. (not met)  DISCHARGE GOALS: Time Frame: 4-6 weeks   1. Pt will improve Oswestry score by at least 10 points. (ongoing)   2. Pt will demonstrate increased AROM of lumbar spine all planes ~ 25% or greater without report of pain to improve functional mobility. (not met)  3. Pt will be able to sustain regular exercise routine including aerobic exercise 3+ times per week without increased symptoms to encourage healthy lifestyle. (not met)   4. Pt will demonstrate increased B LE strength to at least 4+/5 B to aid with functional mobility. (not met)     Rehabilitation Potential For Stated Goals: Good    Physician Response:   Current Plan of Care    _x__ Continue treatment as above adding aquatic therapy    ___ Discontinue treatment          ______________________________     Referring Physician Signature: Miguel A Galarza MD          Date                                The information in this section was collected on 8/7/17 (except where otherwise noted). HISTORY:   History of Present Injury/Illness (Reason for Referral):  Pt reports several years low back pain with a severe episode after lifting heavy objects in 2013 causing left low back L LE pain.   Pt states that he has been receiving chiropractic treatments since then for pain management however, pain continued to bother him especially during/after work shifts at [employer]. He states that he is on his feet on concrete all day long and recently back pain interfering with job activities. In 3 months ago, pt reports he called off work due to pain too severe to perform work activities. Went to MD, imaging showing disc protrusion at L3 and stenosis at L1-2,  L5-S1. Referred to neuro specialist. Epidural injection ordered (not scheduled yet). PT referral with Neuro follow up in 4 weeks. Present symptoms (on day of initial evaluation): constant low back aching, mid to low back. Occasional numbness of B LE (R>L)      · Aggravating factors: standing on hard surfaces 5 min, walking, stairs ambulation, bending   · Relieving factors: recliner, pain medication     · Pain level: 6/10 presently, 9/10 worst, 5/10 best     Past Medical History/Comorbidities: Mr. Wing Lennon  has a past medical history of Allergic rhinitis due to allergen; Arthritis; Colon polyps (8/12/2016); Deficiency, lipoprotein (8/12/2016); [facility] (8/12/2016); Encounter for long-term (current) use of medications (8/12/2016); HLD (hyperlipidemia) (8/12/2016); HTN (hypertension) (8/12/2016); Obesity (8/12/2016); Other abnormal glucose (8/12/2016); Sinusitis, acute maxillary (8/12/2016); and URI (upper respiratory infection) (8/12/2016). Mr. Wing Lennon  has a past surgical history that includes tonsillectomy; adenoidectomy; and other surgical (2005). Social History/Living Environment:     lives with spouse   Prior Level of Function/Work/Activity:  Currently on disability   Dominant Side:         RIGHT  Other Clinical Tests:          MRI lumbar spine: (copied from radiology)   Disc desiccation and mild degenerative changes and facet arthropathy  throughout the lumbar spine. There is a significant central stenosis at L1-2;  the thecal sac measures 6 to 7 mm in AP diameter. At L4-3-4 there is a  broad-based left lateral disc protrusion impinging the enlarged L3 nerve root.   Moderate foraminal stenoses at L5-S1  Previous Treatment Approaches:          Chiropractor   Current Medications:       Current Outpatient Prescriptions:     multivitamin (ONE A DAY) tablet, Take 1 Tab by mouth daily. , Disp: , Rfl:     Insulin Needles, Disposable, (NOVOFINE 32) 32 gauge x 1/4\" ndle, 1 misc daily. For Victoza pen, Disp: 100 Pen Needle, Rfl: 3    traMADol (ULTRAM) 50 mg tablet, Take 1 Tab by mouth every six (6) hours as needed for Pain. Max Daily Amount: 200 mg. Indications: Pain, Disp: 120 Tab, Rfl: 2    metoprolol succinate (TOPROL-XL) 100 mg tablet, Take 1 Tab by mouth daily. , Disp: 90 Tab, Rfl: 3    metroNIDAZOLE (METROGEL) 1 % topical gel, Apply 1 g to affected area daily. Use a thin layer to affected areas after washing, Disp: 60 g, Rfl: 5    lansoprazole (PREVACID) 30 mg capsule, Take 1 Cap by mouth Daily (before breakfast). , Disp: 90 Cap, Rfl: 3    lisinopril-hydroCHLOROthiazide (PRINZIDE, ZESTORETIC) 20-12.5 mg per tablet, Take 1 Tab by mouth daily. , Disp: 90 Tab, Rfl: 3    simvastatin (ZOCOR) 40 mg tablet, Take 1 Tab by mouth daily. , Disp: 90 Tab, Rfl: 3    Liraglutide (VICTOZA) 0.6 mg/0.1 mL (18 mg/3 mL) sub-q pen, 19mg/3ml pen         1.8mg per day  Indications: type 2 diabetes mellitus, Disp: 9 Syringe, Rfl: 3    doxycycline (ADOXA) 100 mg tablet, Take 1 Tab by mouth two (2) times a day. Indications: ACNE ROSACEA, Disp: 100 Tab, Rfl: 2    PEN NEEDLE, DIABETIC (NOVOFINE 32), 32 g by Does Not Apply route daily.  Indications: 32G x 6 MM,, Disp: , Rfl:    Date Last Reviewed:  10/12/2017     EXAMINATION: 10/11/17   Observation/Orthostatic Postural Assessment:          Standing:  · Left scap elevated   · B hips in slight flexion   · Decreased lumbar lordotic curve  · Left lumbar shift   Palpation:         Tender of mid lumbar region   ROM:            Lumbar spine Date:  8/9/17 Date:  10/11/17 Date:     Direction  Parameters Parameters Parameters   Flexion  25% cs 75%    Extension  25% 75% Rotation  R: 25%  L: 15% R: 50%  L: 25%    Side bending  R: 50%  L: 25% cs  R: 50%  L: 50%    Hip IR Very limited B  Mild-mod limitation     Hip ER Mild limitation B cs Mild limitation cs    Hip flexion  R: 90 degrees  L: 100 degrees cs R: 90 deg  L: 100 deg cs    Cs= comparable sign (symptoms provoked)    Strength:            Lower quadrant    DATE  8/9/17 DATE  10/11/17   Hip flexion R: 4-  L: 4- R: 4 +  L: 4 cs   Hip Abduction  R: 4-  L: 4- R: 4  L: 4   Hip Extension  R: --  L: -- R: --  L: --   Knee Flexion  R: 4  L: 4- R: 4+  L: 4+   Knee Extension  R: 4+  L: 4- R: 4+  L:  4+   Ankle Dorsiflexion  R: 4   L: 4 R: 4+  L: 4+   Ankle Plantarflexion  R: 4  L:4  R: 4+  L: 4+          Special Tests:          None tested   Neurological Screen:        Myotomes: intact         Sensation: intact B LE    Functional Mobility:         Gait/Ambulation:  WBOS, ER B hips, increased lateral sway, reduces significantly with verbal cuing each visit- no carry over          Transfers:  Able to sit to stand 5/5 times without use of UE         Stairs:  Not tested    CLINICAL DECISION MAKING:   Outcome Measure: Tool Used: Modified Oswestry Low Back Pain Questionnaire  Score:  Initial: 38/50  Most Recent: 37/50 (Date: 10/11/17 )   Interpretation of Score: Each section is scored on a 0-5 scale, 5 representing the greatest disability. The scores of each section are added together for a total score of 50. Medical Necessity:   · Patient is expected to demonstrate progress in strength and range of motion to decrease assistance required with ADLs as well as work related activities. .  Reason for Services/Other Comments:  · Patient continues to require modification of therapeutic interventions to increase complexity of exercises.             TREATMENT:   (In addition to Assessment/Re-Assessment sessions the following treatments were rendered)  Pre-treatment Symptoms/Complaints: pt states that he is still same as yesterday regarding symptoms. No change. Pain: Initial: left sided low back pain     7/10  Post Session:  6/10      THERAPEUTIC EXERCISE: (40 minutes):  Exercises per grid below to improve mobility, strength and coordination. Required moderate visual, verbal and manual cues to promote proper body alignment, promote proper body posture and promote proper body mechanics. Progressed resistance, range and repetitions as indicated.    9/1/17 9/5/17 9/11/17 9/29/17 10/4/17 10/6/17 10/11/17 10/12/17   Activity/Exercise           Education            LTR          5 x 10 sec B   nustep  X12min; L3 X 13min; L 4 X 12 min L4 X 13 min level 4  X 12 min level 3  X 10 min level 3  X 10 min level 3 X 10 level 4.5   TAs   Encouraged TrA engagement during standing exercises         Supine add sq w/ TAs           Supine TrA with mini march            Supine hip ABD w/ TAs           SKTC           HS/GS str           Anterior/posterior hip stretching     In seated position x 2, 10 sec each using pillow case for assist     Figure 4 supine 4 x 30 sec with increasing intensity    bridge        X 10, 5 sec holds    multif (opp heel and elbow into mat)--supine            Long axis distraction L LE     Manual    Manually x 3 min intermittent    Side stepping  X 2 laps in huff X 4 laps in huff X 4 laps in huff  X 50 feet in all B leading with focus of decreasing ER especially left   X 60ft   2 x 30 ft each direction    Gait avoiding toe out X 2 laps in huff X 4 laps in huff X 4 laps with weighted ball held at eye level to promote decreased lateral movement  X ~100 ft  X 100 ft X 60 ft  X 60 ft    Marching with high knees working on hip/core strength and SLS balance   x 4 laps in huff X 2 laps in uhff    With agility ladder x 5 laps      Sit-stand from mat at elevated height X 15 working on avoiding ER for stabilization X 25  X 10 focusing on decreasing ER       Hip ER/IR    Manually  Manually Manually  X 5 seated  Hook lying and in hip extended    Prone ham curls     X 10                  Manual 15 min: to decrease joint stiffness and soft tissue restriction    · OP with left hip IR/ER, fig 4 stretching   · Long distraction of left hip  · Medial rotation left hip grade III  ·   Modalities:  -     Treatment/Session Assessment: focused on functional hip exercises and manual stretching today to ease pain level. · Response to Treatment: only mild change of pain level with treatment today but ambulation much improved with decreased ER of left hip. · Recommendations/Intent for next treatment session: Progress standing exercises and functional strength training as pt is able.       Future Appointments  Date Time Provider Jonas D[place]   10/16/2017 11:00 AM Arlen Chappell, PT [facility] [place] [facility]   10/18/2017 10:00 AM Arlen Chappell PT [facility]   10/20/2017 10:00 AM Arlen Chappell PT [facility]   10/23/2017 10:00 AM [address], PT [facility]   10/25/2017 10:00 AM Arlen Chappell PT [facility]   10/25/2017 11:00 AM MD LUCILA Garcia   10/27/2017 10:00 AM Arlen Chappell PT [facility]   2/20/2018 8:10 AM [facility]   2/23/2018 3:30 PM MD LUCILA Garcia       Total Treatment Duration:  PT Patient Time In/Time Out  Time In: 1000  Time Out: 1800 [facility], PT

## 2017-10-16 ENCOUNTER — HOSPITAL ENCOUNTER (OUTPATIENT)
Dept: PHYSICAL THERAPY | Age: 57
Discharge: HOME | End: 2017-10-16
Payer: COMMERCIAL

## 2017-10-16 PROCEDURE — 97110 THERAPEUTIC EXERCISES: CPT

## 2017-10-16 PROCEDURE — 97140 MANUAL THERAPY 1/> REGIONS: CPT

## 2017-10-16 NOTE — PROGRESS NOTES
Darcy Cleaning  : 1960 [address] [facility] at [address] [place] [address], [address]  [phone]   [phone]         OUTPATIENT PHYSICAL THERAPY:Daily Note 10/16/2017    ICD-10: Treatment Diagnosis: low back pain (M54.5)  Precautions/Allergies:   Review of patient's allergies indicates no known allergies. Fall Risk Score: 2 (? 5 = High Risk)  MD Orders: evaluate and treat MEDICAL/REFERRING DIAGNOSIS:  Low back pain [M54.5]   DATE OF ONSET: chronic   REFERRING PHYSICIAN: Rosalba Martin MD  RETURN PHYSICIAN APPOINTMENT: 10/9/17     INITIAL ASSESSMENT:  Mr. Rogers Bateman has attended 13 scheduled PT visits treating low back pain. Overall, he has made slow progress with mobility and strength, however, pain continues to be consistently moderate with occasional exacerbations of low back and left hip. Some symptoms suggested hip pathology as well. He has been educated on body mechanics with house work activities and as shown independence with his HEP and reports compliance with performing at home. Due to left hip and low back pain during weightbearing activates, the ability to progress patient to more functional activities is limited. He would benefit from continued skilled PT 3 x per week to continued working toward d/c goals and return to work, with the addition of aquatic therapy as one of those 3 sessions each week to work on more functional core and hip strength/educrance until able to perform on land. PROBLEM LIST (Impacting functional limitations):  1. Decreased Strength  2. Decreased ADL/Functional Activities  3. Increased Pain  4. Decreased Flexibility/Joint Mobility  5. Decreased [place] with Home Exercise Program INTERVENTIONS PLANNED:  1. Cold  2. Heat  3. Home Exercise Program (HEP)  4. Manual Therapy  5. Neuromuscular Re-education/Strengthening  6. Range of Motion (ROM)  7. Therapeutic Exercise/Strengthening   8.  Aquatic therapy TREATMENT PLAN:  Effective Dates: 10/9/17 TO 12/9/17. Frequency/Duration: 3 times a week for 6 weeks  GOALS: (Goals have been discussed and agreed upon with patient.)  SHORT-TERM FUNCTIONAL GOALS: Time Frame: 2-3 weeks   1. Pt will be independent with HEP focusing on core stability and promoting good spinal alignment. (met)  2. Pt will report no symptoms of LE for 1-2 weeks demonstrating centralization of symptoms. (met)  3. Pt will report pain level does not exceed 5/10 in a 1-2 week period of time to demonstrate pain management. (ongoing)   4. Pt will report improved sleep with less disturbance from back pain. (not met)  DISCHARGE GOALS: Time Frame: 4-6 weeks   1. Pt will improve Oswestry score by at least 10 points. (ongoing)   2. Pt will demonstrate increased AROM of lumbar spine all planes ~ 25% or greater without report of pain to improve functional mobility. (not met)  3. Pt will be able to sustain regular exercise routine including aerobic exercise 3+ times per week without increased symptoms to encourage healthy lifestyle. (not met)   4. Pt will demonstrate increased B LE strength to at least 4+/5 B to aid with functional mobility. (not met)     Rehabilitation Potential For Stated Goals: Good    Physician Response:   Current Plan of Care    _x__ Continue treatment as above adding aquatic therapy    ___ Discontinue treatment          ______________________________     Referring Physician Signature: Sea Gonzalez MD          Date                                The information in this section was collected on 8/7/17 (except where otherwise noted). HISTORY:   History of Present Injury/Illness (Reason for Referral):  Pt reports several years low back pain with a severe episode after lifting heavy objects in 2013 causing left low back L LE pain.   Pt states that he has been receiving chiropractic treatments since then for pain management however, pain continued to bother him especially during/after work shifts at [employer]. He states that he is on his feet on concrete all day long and recently back pain interfering with job activities. In 3 months ago, pt reports he called off work due to pain too severe to perform work activities. Went to MD, imaging showing disc protrusion at L3 and stenosis at L1-2,  L5-S1. Referred to neuro specialist. Epidural injection ordered (not scheduled yet). PT referral with Neuro follow up in 4 weeks. Present symptoms (on day of initial evaluation): constant low back aching, mid to low back. Occasional numbness of B LE (R>L)      · Aggravating factors: standing on hard surfaces 5 min, walking, stairs ambulation, bending   · Relieving factors: recliner, pain medication     · Pain level: 6/10 presently, 9/10 worst, 5/10 best     Past Medical History/Comorbidities: Mr. Ish Duque  has a past medical history of Allergic rhinitis due to allergen; Arthritis; Colon polyps (8/12/2016); Deficiency, lipoprotein (8/12/2016); Diabetes mellitus type 2, controlled ([facility] [address] (8/12/2016); Encounter for long-term (current) use of medications (8/12/2016); HLD (hyperlipidemia) (8/12/2016); HTN (hypertension) (8/12/2016); Obesity (8/12/2016); Other abnormal glucose (8/12/2016); Sinusitis, acute maxillary (8/12/2016); and URI (upper respiratory infection) (8/12/2016). Mr. Ish Duque  has a past surgical history that includes tonsillectomy; adenoidectomy; and other surgical (2005). Social History/Living Environment:     lives with spouse   Prior Level of Function/Work/Activity:  Currently on disability   Dominant Side:         RIGHT  Other Clinical Tests:          MRI lumbar spine: (copied from radiology)   Disc desiccation and mild degenerative changes and facet arthropathy  throughout the lumbar spine. There is a significant central stenosis at L1-2;  the thecal sac measures 6 to 7 mm in AP diameter. At L4-3-4 there is a  broad-based left lateral disc protrusion impinging the enlarged L3 nerve root.   Moderate foraminal stenoses at L5-S1  Previous Treatment Approaches:          Chiropractor   Current Medications:       Current Outpatient Prescriptions:     multivitamin (ONE A DAY) tablet, Take 1 Tab by mouth daily. , Disp: , Rfl:     Insulin Needles, Disposable, (NOVOFINE 32) 32 gauge x 1/4\" ndle, 1 misc daily. For Victoza pen, Disp: 100 Pen Needle, Rfl: 3    traMADol (ULTRAM) 50 mg tablet, Take 1 Tab by mouth every six (6) hours as needed for Pain. Max Daily Amount: 200 mg. Indications: Pain, Disp: 120 Tab, Rfl: 2    metoprolol succinate (TOPROL-XL) 100 mg tablet, Take 1 Tab by mouth daily. , Disp: 90 Tab, Rfl: 3    metroNIDAZOLE (METROGEL) 1 % topical gel, Apply 1 g to affected area daily. Use a thin layer to affected areas after washing, Disp: 60 g, Rfl: 5    lansoprazole (PREVACID) 30 mg capsule, Take 1 Cap by mouth Daily (before breakfast). , Disp: 90 Cap, Rfl: 3    lisinopril-hydroCHLOROthiazide (PRINZIDE, ZESTORETIC) 20-12.5 mg per tablet, Take 1 Tab by mouth daily. , Disp: 90 Tab, Rfl: 3    simvastatin (ZOCOR) 40 mg tablet, Take 1 Tab by mouth daily. , Disp: 90 Tab, Rfl: 3    Liraglutide (VICTOZA) 0.6 mg/0.1 mL (18 mg/3 mL) sub-q pen, 19mg/3ml pen         1.8mg per day  Indications: type 2 diabetes mellitus, Disp: 9 Syringe, Rfl: 3    doxycycline (ADOXA) 100 mg tablet, Take 1 Tab by mouth two (2) times a day. Indications: ACNE ROSACEA, Disp: 100 Tab, Rfl: 2    PEN NEEDLE, DIABETIC (NOVOFINE 32), 32 g by Does Not Apply route daily.  Indications: 32G x 6 MM,, Disp: , Rfl:    Date Last Reviewed:  10/16/2017     EXAMINATION: 10/11/17   Observation/Orthostatic Postural Assessment:          Standing:  · Left scap elevated   · B hips in slight flexion   · Decreased lumbar lordotic curve  · Left lumbar shift   Palpation:         Tender of mid lumbar region   ROM:            Lumbar spine Date:  8/9/17 Date:  10/11/17 Date:     Direction  Parameters Parameters Parameters   Flexion  25% cs 75%    Extension  25% 75% Rotation  R: 25%  L: 15% R: 50%  L: 25%    Side bending  R: 50%  L: 25% cs  R: 50%  L: 50%    Hip IR Very limited B  Mild-mod limitation     Hip ER Mild limitation B cs Mild limitation cs    Hip flexion  R: 90 degrees  L: 100 degrees cs R: 90 deg  L: 100 deg cs    Cs= comparable sign (symptoms provoked)    Strength:            Lower quadrant    DATE  8/9/17 DATE  10/11/17   Hip flexion R: 4-  L: 4- R: 4 +  L: 4 cs   Hip Abduction  R: 4-  L: 4- R: 4  L: 4   Hip Extension  R: --  L: -- R: --  L: --   Knee Flexion  R: 4  L: 4- R: 4+  L: 4+   Knee Extension  R: 4+  L: 4- R: 4+  L:  4+   Ankle Dorsiflexion  R: 4   L: 4 R: 4+  L: 4+   Ankle Plantarflexion  R: 4  L:4  R: 4+  L: 4+          Special Tests:          None tested   Neurological Screen:        Myotomes: intact         Sensation: intact B LE    Functional Mobility:         Gait/Ambulation:  WBOS, ER B hips, increased lateral sway, reduces significantly with verbal cuing each visit- no carry over          Transfers:  Able to sit to stand 5/5 times without use of UE         Stairs:  Not tested    CLINICAL DECISION MAKING:   Outcome Measure: Tool Used: Modified Oswestry Low Back Pain Questionnaire  Score:  Initial: 38/50  Most Recent: 37/50 (Date: 10/11/17 )   Interpretation of Score: Each section is scored on a 0-5 scale, 5 representing the greatest disability. The scores of each section are added together for a total score of 50. Medical Necessity:   · Patient is expected to demonstrate progress in strength and range of motion to decrease assistance required with ADLs as well as work related activities. .  Reason for Services/Other Comments:  · Patient continues to require modification of therapeutic interventions to increase complexity of exercises.             TREATMENT:   (In addition to Assessment/Re-Assessment sessions the following treatments were rendered)  Pre-treatment Symptoms/Complaints: pt reports pain continues to be moderate and was extremely sore of left hip after last session's stretching. Pain: Initial: left sided low back and hip pain     7/10  Post Session:  6/10      THERAPEUTIC EXERCISE: (20 minutes):  Exercises per grid below to improve mobility, strength and coordination. Required moderate visual, verbal and manual cues to promote proper body alignment, promote proper body posture and promote proper body mechanics. Progressed resistance, range and repetitions as indicated.    9/5/17 9/11/17 9/29/17 10/4/17 10/6/17 10/11/17 10/12/17 10/16/17   Activity/Exercise           Education            LTR         5 x 10 sec B    nustep  X 13min; L 4 X 12 min L4 X 13 min level 4  X 12 min level 3  X 10 min level 3  X 10 min level 3 X 10 level 4.5 X 10 min level 4   TAs  Encouraged TrA engagement during standing exercises          Supine add sq w/ TAs           Supine TrA with mini march            Supine hip ABD w/ TAs           SKTC           HS/GS str           Anterior/posterior hip stretching    In seated position x 2, 10 sec each using pillow case for assist     Figure 4 supine 4 x 30 sec with increasing intensity     bridge       X 10, 5 sec holds     multif (opp heel and elbow into mat)--supine            Long axis distraction L LE    Manual    Manually x 3 min intermittent     Side stepping  X 4 laps in huff X 4 laps in huff  X 50 feet in all B leading with focus of decreasing ER especially left   X 60ft   2 x 30 ft each direction  2 x 30 feet    Gait avoiding toe out X 4 laps in huff X 4 laps with weighted ball held at eye level to promote decreased lateral movement  X ~100 ft  X 100 ft X 60 ft  X 60 ft     Marching with high knees working on hip/core strength and SLS balance  x 4 laps in huff X 2 laps in huff    With agility ladder x 5 laps       Sit-stand from mat at elevated height X 25  X 10 focusing on decreasing ER        Hip ER/IR   Manually  Manually Manually  X 5 seated  Hook lying and in hip extended     Prone ham curls    X 10 Manual 40 min: to decrease joint stiffness and soft tissue restriction    · OP with left hip IR/ER, fig 4 stretching   · Long distraction of left hip  · Medial rotation left hip grade III  · CPA's thoracic intervertebral and costovertebral joints B grade IV and III 2 bouts of 20 sec each joint  ·   Modalities:    Treatment/Session Assessment: lower-mid T-spine and costal-vertebral joints B stiffness as well as MF tightness present today  · Response to Treatment: improved movement of thoracic soft tissue and vertebral joints noted after manual treatment. · Recommendations/Intent for next treatment session: Progress standing exercises and functional strength training as pt is able.  Use hawkgrip on soft tissue of low-mid back if tolerates     Future Appointments  Date Time Provider Jonas Denton   10/18/2017 10:00 AM Russell Osorio PT [facility]   10/20/2017 10:00 AM Russell Osorio PT [facility]   10/23/2017 10:00 AM Luis Hendricks PT [facility]   10/25/2017 10:00 AM Russell Osorio PT [facility]   10/25/2017 11:00 AM MD LUCILA Germain   10/27/2017 10:00 AM Russell Osorio PT [facility]   2/20/2018 8:10 AM [facility]   2/23/2018 3:30 PM MD LUCILA Germain       Total Treatment Duration:  PT Patient Time In/Time Out  Time In: 1005  Time Out: 1800 [facility], PT

## 2017-10-18 ENCOUNTER — HOSPITAL ENCOUNTER (OUTPATIENT)
Dept: PHYSICAL THERAPY | Age: 57
Discharge: HOME | End: 2017-10-18
Payer: COMMERCIAL

## 2017-10-18 PROCEDURE — 97110 THERAPEUTIC EXERCISES: CPT

## 2017-10-18 PROCEDURE — 97140 MANUAL THERAPY 1/> REGIONS: CPT

## 2017-10-18 NOTE — PROGRESS NOTES
Evelin Buckley  : 1960 [id number] [facility],2[address] Floor at [address] [facility], [address] [place] [address], [facility], [address] [place] [address]  [phone]   [phone]         OUTPATIENT PHYSICAL THERAPY:Daily Note 10/18/2017    ICD-10: Treatment Diagnosis: low back pain (M54.5)  Precautions/Allergies:   Review of patient's allergies indicates no known allergies. Fall Risk Score: 2 (? 5 = High Risk)  MD Orders: evaluate and treat MEDICAL/REFERRING DIAGNOSIS:  Low back pain [M54.5]   DATE OF ONSET: chronic   REFERRING PHYSICIAN: Horacio Montes MD  RETURN PHYSICIAN APPOINTMENT: 10/9/17     INITIAL ASSESSMENT:  Mr. Navneet Celestin has attended 13 scheduled PT visits treating low back pain. Overall, he has made slow progress with mobility and strength, however, pain continues to be consistently moderate with occasional exacerbations of low back and left hip. Some symptoms suggested hip pathology as well. He has been educated on body mechanics with house work activities and as shown independence with his HEP and reports compliance with performing at home. Due to left hip and low back pain during weightbearing activates, the ability to progress patient to more functional activities is limited. He would benefit from continued skilled PT 3 x per week to continued working toward d/c goals and return to work, with the addition of aquatic therapy as one of those 3 sessions each week to work on more functional core and hip strength/educrance until able to perform on land. PROBLEM LIST (Impacting functional limitations):  1. Decreased Strength  2. Decreased ADL/Functional Activities  3. Increased Pain  4. Decreased Flexibility/Joint Mobility  5. Decreased Navarro with Home Exercise Program INTERVENTIONS PLANNED:  1. Cold  2. Heat  3. Home Exercise Program (HEP)  4. Manual Therapy  5. Neuromuscular Re-education/Strengthening  6. Range of Motion (ROM)  7. Therapeutic Exercise/Strengthening   8.  Aquatic therapy TREATMENT PLAN:  Effective Dates: 10/9/17 TO 12/9/17. Frequency/Duration: 3 times a week for 6 weeks  GOALS: (Goals have been discussed and agreed upon with patient.)  SHORT-TERM FUNCTIONAL GOALS: Time Frame: 2-3 weeks   1. Pt will be independent with HEP focusing on core stability and promoting good spinal alignment. (met)  2. Pt will report no symptoms of LE for 1-2 weeks demonstrating centralization of symptoms. (met)  3. Pt will report pain level does not exceed 5/10 in a 1-2 week period of time to demonstrate pain management. (ongoing)   4. Pt will report improved sleep with less disturbance from back pain. (not met)  DISCHARGE GOALS: Time Frame: 4-6 weeks   1. Pt will improve Oswestry score by at least 10 points. (ongoing)   2. Pt will demonstrate increased AROM of lumbar spine all planes ~ 25% or greater without report of pain to improve functional mobility. (not met)  3. Pt will be able to sustain regular exercise routine including aerobic exercise 3+ times per week without increased symptoms to encourage healthy lifestyle. (not met)   4. Pt will demonstrate increased B LE strength to at least 4+/5 B to aid with functional mobility. (not met)     Rehabilitation Potential For Stated Goals: Good                The information in this section was collected on 8/7/17 (except where otherwise noted). HISTORY:   History of Present Injury/Illness (Reason for Referral):  Pt reports several years low back pain with a severe episode after lifting heavy objects in 2013 causing left low back L LE pain. Pt states that he has been receiving chiropractic treatments since then for pain management however, pain continued to bother him especially during/after work shifts at [employer]. He states that he is on his feet on concrete all day long and recently back pain interfering with job activities. In 3 months ago, pt reports he called off work due to pain too severe to perform work activities.  Went to MD, imaging showing disc protrusion at L3 and stenosis at L1-2,  L5-S1. Referred to neuro specialist. Epidural injection ordered (not scheduled yet). PT referral with Neuro follow up in 4 weeks. Present symptoms (on day of initial evaluation): constant low back aching, mid to low back. Occasional numbness of B LE (R>L)      · Aggravating factors: standing on hard surfaces 5 min, walking, stairs ambulation, bending   · Relieving factors: recliner, pain medication     · Pain level: 6/10 presently, 9/10 worst, 5/10 best     Past Medical History/Comorbidities: Mr. Nella Clemons  has a past medical history of Allergic rhinitis due to allergen; Arthritis; Colon polyps (8/12/2016); Deficiency, lipoprotein (8/12/2016); [facility] (8/12/2016); Encounter for long-term (current) use of medications (8/12/2016); HLD (hyperlipidemia) (8/12/2016); HTN (hypertension) (8/12/2016); Obesity (8/12/2016); Other abnormal glucose (8/12/2016); Sinusitis, acute maxillary (8/12/2016); and URI (upper respiratory infection) (8/12/2016). Mr. Nella Clemons  has a past surgical history that includes tonsillectomy; adenoidectomy; and other surgical (2005). Social History/Living Environment:     lives with spouse   Prior Level of Function/Work/Activity:  Currently on disability   Dominant Side:         RIGHT  Other Clinical Tests:          MRI lumbar spine: (copied from radiology)   Disc desiccation and mild degenerative changes and facet arthropathy  throughout the lumbar spine. There is a significant central stenosis at L1-2;  the thecal sac measures 6 to 7 mm in AP diameter. At L4-3-4 there is a  broad-based left lateral disc protrusion impinging the enlarged L3 nerve root. Moderate foraminal stenoses at L5-S1  Previous Treatment Approaches:          Chiropractor   Current Medications:       Current Outpatient Prescriptions:     multivitamin (ONE A DAY) tablet, Take 1 Tab by mouth daily. , Disp: , Rfl:     Insulin Needles, Disposable, (NOVOFINE 32) 32 gauge x 1/4\" ndle, 1 misc daily. For Victoza pen, Disp: 100 Pen Needle, Rfl: 3    traMADol (ULTRAM) 50 mg tablet, Take 1 Tab by mouth every six (6) hours as needed for Pain. Max Daily Amount: 200 mg. Indications: Pain, Disp: 120 Tab, Rfl: 2    metoprolol succinate (TOPROL-XL) 100 mg tablet, Take 1 Tab by mouth daily. , Disp: 90 Tab, Rfl: 3    metroNIDAZOLE (METROGEL) 1 % topical gel, Apply 1 g to affected area daily. Use a thin layer to affected areas after washing, Disp: 60 g, Rfl: 5    lansoprazole (PREVACID) 30 mg capsule, Take 1 Cap by mouth Daily (before breakfast). , Disp: 90 Cap, Rfl: 3    lisinopril-hydroCHLOROthiazide (PRINZIDE, ZESTORETIC) 20-12.5 mg per tablet, Take 1 Tab by mouth daily. , Disp: 90 Tab, Rfl: 3    simvastatin (ZOCOR) 40 mg tablet, Take 1 Tab by mouth daily. , Disp: 90 Tab, Rfl: 3    Liraglutide (VICTOZA) 0.6 mg/0.1 mL (18 mg/3 mL) sub-q pen, 19mg/3ml pen         1.8mg per day  Indications: type 2 diabetes mellitus, Disp: 9 Syringe, Rfl: 3    doxycycline (ADOXA) 100 mg tablet, Take 1 Tab by mouth two (2) times a day. Indications: ACNE ROSACEA, Disp: 100 Tab, Rfl: 2    PEN NEEDLE, DIABETIC (NOVOFINE 32), 32 g by Does Not Apply route daily.  Indications: 32G x 6 MM,, Disp: , Rfl:       gabapentin 300 mg x 3    Date Last Reviewed:  10/18/2017     EXAMINATION: 10/11/17   Observation/Orthostatic Postural Assessment:          Standing:  · Left scap elevated   · B hips in slight flexion   · Decreased lumbar lordotic curve  · Left lumbar shift   Palpation:         Tender of mid lumbar region   ROM:            Lumbar spine Date:  8/9/17 Date:  10/11/17 Date:     Direction  Parameters Parameters Parameters   Flexion  25% cs 75%    Extension  25% 75%    Rotation  R: 25%  L: 15% R: 50%  L: 25%    Side bending  R: 50%  L: 25% cs  R: 50%  L: 50%    Hip IR Very limited B  Mild-mod limitation     Hip ER Mild limitation B cs Mild limitation cs    Hip flexion  R: 90 degrees  L: 100 degrees cs R: 90 deg  L: 100 deg cs    Cs= comparable sign (symptoms provoked)    Strength:            Lower quadrant    DATE  8/9/17 DATE  10/11/17   Hip flexion R: 4-  L: 4- R: 4 +  L: 4 cs   Hip Abduction  R: 4-  L: 4- R: 4  L: 4   Hip Extension  R: --  L: -- R: --  L: --   Knee Flexion  R: 4  L: 4- R: 4+  L: 4+   Knee Extension  R: 4+  L: 4- R: 4+  L:  4+   Ankle Dorsiflexion  R: 4   L: 4 R: 4+  L: 4+   Ankle Plantarflexion  R: 4  L:4  R: 4+  L: 4+          Special Tests:          None tested   Neurological Screen:        Myotomes: intact         Sensation: intact B LE    Functional Mobility:         Gait/Ambulation:  WBOS, ER B hips, increased lateral sway, reduces significantly with verbal cuing each visit- no carry over          Transfers:  Able to sit to stand 5/5 times without use of UE         Stairs:  Not tested    CLINICAL DECISION MAKING:   Outcome Measure: Tool Used: Modified Oswestry Low Back Pain Questionnaire  Score:  Initial: 38/50  Most Recent: 37/50 (Date: 10/11/17 )   Interpretation of Score: Each section is scored on a 0-5 scale, 5 representing the greatest disability. The scores of each section are added together for a total score of 50. Medical Necessity:   · Patient is expected to demonstrate progress in strength and range of motion to decrease assistance required with ADLs as well as work related activities. .  Reason for Services/Other Comments:  · Patient continues to require modification of therapeutic interventions to increase complexity of exercises. TREATMENT:   (In addition to Assessment/Re-Assessment sessions the following treatments were rendered)  Pre-treatment Symptoms/Complaints:pt states that he was sore of central low back only after last session. He is now taking gabapentin 3 times per day which has made him a little groggy by afternoon but sleeping well at night no significant change in pain level noticed at this time.      Pain: Initial: left sided low back and hip pain     6/10  Post Session:  5/10      THERAPEUTIC EXERCISE: (30 minutes):  Exercises per grid below to improve mobility, strength and coordination. Required moderate visual, verbal and manual cues to promote proper body alignment, promote proper body posture and promote proper body mechanics. Progressed resistance, range and repetitions as indicated.    9/11/17 9/29/17 10/4/17 10/6/17 10/11/17 10/12/17 10/16/17 10/18/17   Activity/Exercise           Education            LTR        5 x 10 sec B     nustep  X 12 min L4 X 13 min level 4  X 12 min level 3  X 10 min level 3  X 10 min level 3 X 10 level 4.5 X 10 min level 4 X 10 min level 4    TAs Encouraged TrA engagement during standing exercises           Supine add sq w/ TAs           Supine TrA with mini march            Supine hip ABD w/ TAs           SKTC           HS/GS str           Anterior/posterior hip stretching   In seated position x 2, 10 sec each using pillow case for assist     Figure 4 supine 4 x 30 sec with increasing intensity      bridge      X 10, 5 sec holds      multif (opp heel and elbow into mat)--supine            Long axis distraction L LE   Manual    Manually x 3 min intermittent      Side stepping  X 4 laps in huff  X 50 feet in all B leading with focus of decreasing ER especially left   X 60ft   2 x 30 ft each direction  2 x 30 feet  2 x 30 ft with OTB    Gait avoiding toe out X 4 laps with weighted ball held at eye level to promote decreased lateral movement  X ~100 ft  X 100 ft X 60 ft  X 60 ft    4 x 60 feet focusing on even step length and decreased lateral movement   Marching with high knees working on hip/core strength and SLS balance X 2 laps in huff    With agility ladder x 5 laps     X 60 ft    Sit-stand from mat at elevated height  X 10 focusing on decreasing ER         Hip ER/IR  Manually  Manually Manually  X 5 seated  Hook lying and in hip extended      Prone ham curls   X 10      X 5              Manual 30 min: to decrease joint stiffness and soft tissue restriction     · Long distraction of left hip  · Medial rotation left hip grade III  · CPA's thoracic intervertebral and costovertebral joints B grade IV and III 2 bouts of 20 sec each joint  · MFR to lumbar and thoracic region all planes   ·   Modalities:    Treatment/Session Assessment: lower-mid T-spine and costal-vertebral joints B stiffness as well as MF tightness present today  · Response to Treatment: improved movement of thoracic soft tissue and vertebral joints noted after manual treatment. Spinal mobility and hip position improved after session today. · Recommendations/Intent for next treatment session: Progress standing exercises and functional strength training as pt is able.  Use hawkgrip on soft tissue of low-mid back if tolerates     Future Appointments  Date Time Provider Jonas Denton   10/20/2017 10:00 AM [facility]   10/23/2017 10:00 AM Tenisha Mckeon [facility]   10/25/2017 11:00 AM MD LUCILA Cristobal   10/27/2017 10:00 AM Alberto Gibson PT [facility]   11/1/2017 1:30 PM Sania Shannon PT [facility]   2/20/2018 8:10 AM [facility]   2/23/2018 3:30 PM MD LUCILA Cristobal       Total Treatment Duration:  PT Patient Time In/Time Out  Time In: 1000  Time Out: [address] [facility], PT

## 2017-10-20 ENCOUNTER — HOSPITAL ENCOUNTER (OUTPATIENT)
Dept: PHYSICAL THERAPY | Age: 57
Discharge: HOME | End: 2017-10-20
Payer: COMMERCIAL

## 2017-10-20 PROCEDURE — 97140 MANUAL THERAPY 1/> REGIONS: CPT

## 2017-10-20 PROCEDURE — 97110 THERAPEUTIC EXERCISES: CPT

## 2017-10-20 NOTE — PROGRESS NOTES
Rosy Carlos  : 1960 [address] at [address], [address] [place] [address], [address]  [phone]   [phone]         OUTPATIENT PHYSICAL THERAPY:Daily Note 10/20/2017    ICD-10: Treatment Diagnosis: low back pain (M54.5)  Precautions/Allergies:   Review of patient's allergies indicates no known allergies. Fall Risk Score: 2 (? 5 = High Risk)  MD Orders: evaluate and treat MEDICAL/REFERRING DIAGNOSIS:  Low back pain [M54.5]   DATE OF ONSET: chronic   REFERRING PHYSICIAN: Corey Hunt MD  RETURN PHYSICIAN APPOINTMENT: 10/9/17     INITIAL ASSESSMENT:  Mr. Len Wilson has attended 13 scheduled PT visits treating low back pain. Overall, he has made slow progress with mobility and strength, however, pain continues to be consistently moderate with occasional exacerbations of low back and left hip. Some symptoms suggested hip pathology as well. He has been educated on body mechanics with house work activities and as shown independence with his HEP and reports compliance with performing at home. Due to left hip and low back pain during weightbearing activates, the ability to progress patient to more functional activities is limited. He would benefit from continued skilled PT 3 x per week to continued working toward d/c goals and return to work, with the addition of aquatic therapy as one of those 3 sessions each week to work on more functional core and hip strength/educrance until able to perform on land. PROBLEM LIST (Impacting functional limitations):  1. Decreased Strength  2. Decreased ADL/Functional Activities  3. Increased Pain  4. Decreased Flexibility/Joint Mobility  5. Decreased [place] with Home Exercise Program INTERVENTIONS PLANNED:  1. Cold  2. Heat  3. Home Exercise Program (HEP)  4. Manual Therapy  5. Neuromuscular Re-education/Strengthening  6. Range of Motion (ROM)  7. Therapeutic Exercise/Strengthening   8.  Aquatic therapy TREATMENT PLAN:  Effective Dates: 10/9/17 TO 12/9/17. Frequency/Duration: 3 times a week for 6 weeks  GOALS: (Goals have been discussed and agreed upon with patient.)  SHORT-TERM FUNCTIONAL GOALS: Time Frame: 2-3 weeks   1. Pt will be independent with HEP focusing on core stability and promoting good spinal alignment. (met)  2. Pt will report no symptoms of LE for 1-2 weeks demonstrating centralization of symptoms. (met)  3. Pt will report pain level does not exceed 5/10 in a 1-2 week period of time to demonstrate pain management. (ongoing)   4. Pt will report improved sleep with less disturbance from back pain. (not met)  DISCHARGE GOALS: Time Frame: 4-6 weeks   1. Pt will improve Oswestry score by at least 10 points. (ongoing)   2. Pt will demonstrate increased AROM of lumbar spine all planes ~ 25% or greater without report of pain to improve functional mobility. (not met)  3. Pt will be able to sustain regular exercise routine including aerobic exercise 3+ times per week without increased symptoms to encourage healthy lifestyle. (not met)   4. Pt will demonstrate increased B LE strength to at least 4+/5 B to aid with functional mobility. (not met)     Rehabilitation Potential For Stated Goals: Good                The information in this section was collected on 8/7/17 (except where otherwise noted). HISTORY:   History of Present Injury/Illness (Reason for Referral):  Pt reports several years low back pain with a severe episode after lifting heavy objects in 2013 causing left low back L LE pain. Pt states that he has been receiving chiropractic treatments since then for pain management however, pain continued to bother him especially during/after work shifts at [employer]. He states that he is on his feet on concrete all day long and recently back pain interfering with job activities. In 3 months ago, pt reports he called off work due to pain too severe to perform work activities.  Went to MD, imaging showing disc protrusion at L3 and stenosis at L1-2,  L5-S1. Referred to neuro specialist. Epidural injection ordered (not scheduled yet). PT referral with Neuro follow up in 4 weeks. Present symptoms (on day of initial evaluation): constant low back aching, mid to low back. Occasional numbness of B LE (R>L)      · Aggravating factors: standing on hard surfaces 5 min, walking, stairs ambulation, bending   · Relieving factors: recliner, pain medication     · Pain level: 6/10 presently, 9/10 worst, 5/10 best     Past Medical History/Comorbidities: Mr. Navneet Celestin  has a past medical history of Allergic rhinitis due to allergen; Arthritis; Colon polyps (8/12/2016); Deficiency, lipoprotein (8/12/2016); [facility] (8/12/2016); Encounter for long-term (current) use of medications (8/12/2016); HLD (hyperlipidemia) (8/12/2016); HTN (hypertension) (8/12/2016); Obesity (8/12/2016); Other abnormal glucose (8/12/2016); Sinusitis, acute maxillary (8/12/2016); and URI (upper respiratory infection) (8/12/2016). Mr. Navneet Celestin  has a past surgical history that includes tonsillectomy; adenoidectomy; and other surgical (2005). Social History/Living Environment:     lives with spouse   Prior Level of Function/Work/Activity:  Currently on disability   Dominant Side:         RIGHT  Other Clinical Tests:          MRI lumbar spine: (copied from radiology)   Disc desiccation and mild degenerative changes and facet arthropathy  throughout the lumbar spine. There is a significant central stenosis at L1-2;  the thecal sac measures 6 to 7 mm in AP diameter. At L4-3-4 there is a  broad-based left lateral disc protrusion impinging the enlarged L3 nerve root. Moderate foraminal stenoses at L5-S1  Previous Treatment Approaches:          Chiropractor   Current Medications:       Current Outpatient Prescriptions:     multivitamin (ONE A DAY) tablet, Take 1 Tab by mouth daily. , Disp: , Rfl:     Insulin Needles, Disposable, (NOVOFINE 32) 32 gauge x 1/4\" ndle, 1 misc daily. For Victoza pen, Disp: 100 Pen Needle, Rfl: 3    traMADol (ULTRAM) 50 mg tablet, Take 1 Tab by mouth every six (6) hours as needed for Pain. Max Daily Amount: 200 mg. Indications: Pain, Disp: 120 Tab, Rfl: 2    metoprolol succinate (TOPROL-XL) 100 mg tablet, Take 1 Tab by mouth daily. , Disp: 90 Tab, Rfl: 3    metroNIDAZOLE (METROGEL) 1 % topical gel, Apply 1 g to affected area daily. Use a thin layer to affected areas after washing, Disp: 60 g, Rfl: 5    lansoprazole (PREVACID) 30 mg capsule, Take 1 Cap by mouth Daily (before breakfast). , Disp: 90 Cap, Rfl: 3    lisinopril-hydroCHLOROthiazide (PRINZIDE, ZESTORETIC) 20-12.5 mg per tablet, Take 1 Tab by mouth daily. , Disp: 90 Tab, Rfl: 3    simvastatin (ZOCOR) 40 mg tablet, Take 1 Tab by mouth daily. , Disp: 90 Tab, Rfl: 3    Liraglutide (VICTOZA) 0.6 mg/0.1 mL (18 mg/3 mL) sub-q pen, 19mg/3ml pen         1.8mg per day  Indications: type 2 diabetes mellitus, Disp: 9 Syringe, Rfl: 3    doxycycline (ADOXA) 100 mg tablet, Take 1 Tab by mouth two (2) times a day. Indications: ACNE ROSACEA, Disp: 100 Tab, Rfl: 2    PEN NEEDLE, DIABETIC (NOVOFINE 32), 32 g by Does Not Apply route daily.  Indications: 32G x 6 MM,, Disp: , Rfl:       gabapentin 300 mg x 3    Date Last Reviewed:  10/20/2017     EXAMINATION: 10/11/17   Observation/Orthostatic Postural Assessment:          Standing:  · Left scap elevated   · B hips in slight flexion   · Decreased lumbar lordotic curve  · Left lumbar shift   Palpation:         Tender of mid lumbar region   ROM:            Lumbar spine Date:  8/9/17 Date:  10/11/17 Date:     Direction  Parameters Parameters Parameters   Flexion  25% cs 75%    Extension  25% 75%    Rotation  R: 25%  L: 15% R: 50%  L: 25%    Side bending  R: 50%  L: 25% cs  R: 50%  L: 50%    Hip IR Very limited B  Mild-mod limitation     Hip ER Mild limitation B cs Mild limitation cs    Hip flexion  R: 90 degrees  L: 100 degrees cs R: 90 deg  L: 100 deg cs    Cs= comparable sign (symptoms provoked)    Strength:            Lower quadrant    DATE  8/9/17 DATE  10/11/17   Hip flexion R: 4-  L: 4- R: 4 +  L: 4 cs   Hip Abduction  R: 4-  L: 4- R: 4  L: 4   Hip Extension  R: --  L: -- R: --  L: --   Knee Flexion  R: 4  L: 4- R: 4+  L: 4+   Knee Extension  R: 4+  L: 4- R: 4+  L:  4+   Ankle Dorsiflexion  R: 4   L: 4 R: 4+  L: 4+   Ankle Plantarflexion  R: 4  L:4  R: 4+  L: 4+          Special Tests:          None tested   Neurological Screen:        Myotomes: intact         Sensation: intact B LE    Functional Mobility:         Gait/Ambulation:  WBOS, ER B hips, increased lateral sway, reduces significantly with verbal cuing each visit- no carry over          Transfers:  Able to sit to stand 5/5 times without use of UE         Stairs:  Not tested    CLINICAL DECISION MAKING:   Outcome Measure: Tool Used: Modified Oswestry Low Back Pain Questionnaire  Score:  Initial: 38/50  Most Recent: 37/50 (Date: 10/11/17 )   Interpretation of Score: Each section is scored on a 0-5 scale, 5 representing the greatest disability. The scores of each section are added together for a total score of 50. Medical Necessity:   · Patient is expected to demonstrate progress in strength and range of motion to decrease assistance required with ADLs as well as work related activities. .  Reason for Services/Other Comments:  · Patient continues to require modification of therapeutic interventions to increase complexity of exercises. TREATMENT:   (In addition to Assessment/Re-Assessment sessions the following treatments were rendered)  Pre-treatment Symptoms/Complaints:pt with no new c/o's today. Continues with low back and hip soreness. He will attend 1 session of aquatic therapy each week.       Pain: Initial: left sided low back and hip pain     6/10  Post Session:  5/10      THERAPEUTIC EXERCISE: (30 minutes):  Exercises per grid below to improve mobility, strength and coordination. Required moderate visual, verbal and manual cues to promote proper body alignment, promote proper body posture and promote proper body mechanics. Progressed resistance, range and repetitions as indicated.    9/11/17 9/29/17 10/4/17 10/6/17 10/11/17 10/12/17 10/16/17 10/18/17 10/20/17   Activity/Exercise            Education             LTR        5 x 10 sec B      nustep  X 12 min L4 X 13 min level 4  X 12 min level 3  X 10 min level 3  X 10 min level 3 X 10 level 4.5 X 10 min level 4 X 10 min level 4  X 12 min level 4.5   TAs Encouraged TrA engagement during standing exercises            Supine add sq w/ TAs            Supine TrA with mini march             Supine hip ABD w/ TAs            SKTC            HS/GS str            Anterior/posterior hip stretching   In seated position x 2, 10 sec each using pillow case for assist     Figure 4 supine 4 x 30 sec with increasing intensity       bridge      X 10, 5 sec holds       multif (opp heel and elbow into mat)--supine             Long axis distraction L LE   Manual    Manually x 3 min intermittent    In prone manually    Side stepping  X 4 laps in huff  X 50 feet in all B leading with focus of decreasing ER especially left   X 60ft   2 x 30 ft each direction  2 x 30 feet  2 x 30 ft with OTB  In ladder x 2 laps no resistance    Gait avoiding toe out X 4 laps with weighted ball held at eye level to promote decreased lateral movement  X ~100 ft  X 100 ft X 60 ft  X 60 ft    4 x 60 feet focusing on even step length and decreased lateral movement In ladder x 3 laps    Marching with high knees working on hip/core strength and SLS balance X 2 laps in huff    With agility ladder x 5 laps     X 60 ft     Sit-stand from mat at elevated height  X 10 focusing on decreasing ER          Hip ER/IR  Manually  Manually Manually  X 5 seated  Hook lying and in hip extended    Manually    Prone ham curls   X 10      X 5 X5               Manual 30 min: to decrease joint stiffness and soft tissue restriction     · Long distraction of left hip  · Medial rotation left hip grade III in prone   · CPA's thoracic intervertebral and costovertebral joints B grade IV and III 2 bouts of 20 sec each joint  · MFR to lumbar and thoracic region all planes   ·   Modalities:    Treatment/Session Assessment: very stiff through costovertebral joints today. · Response to Treatment: improved movement of thoracic soft tissue and vertebral joints noted after manual treatment. Spinal mobility and hip position improved after session today. · Recommendations/Intent for next treatment session: Progress standing exercises and functional strength training as pt is able.  Use hawkgrip on soft tissue of low-mid back if tolerates     Future Appointments  Date Time Provider Jonas Corrie   10/23/2017 10:00 AM Nicci Blackburn, PT [facility]   10/25/2017 11:00 AM MD LUCILA Bassett   10/27/2017 10:00 AM Maulik Ginger, PT [facility]   10/30/2017 10:00 AM Maulik Ginger, PT [facility]   11/1/2017 1:30 PM Sania Mcrae, PT [facility]   11/3/2017 10:00 AM Maulik Ginger, PT [facility]   11/6/2017 10:00 AM Maulik Ginger, PT [facility]   11/13/2017 10:00 AM Maulik Ginger, PT [facility]   11/17/2017 10:00 AM Maulik Ginger, PT [facility]   2/20/2018 8:10 AM [facility]   2/23/2018 3:30 PM MD LUCILA Bassett       Total Treatment Duration:  PT Patient Time In/Time Out  Time In: 1000  Time Out: [facility], PT

## 2017-10-23 ENCOUNTER — HOSPITAL ENCOUNTER (OUTPATIENT)
Dept: PHYSICAL THERAPY | Age: 57
Discharge: HOME | End: 2017-10-23
Payer: COMMERCIAL

## 2017-10-23 PROCEDURE — 97140 MANUAL THERAPY 1/> REGIONS: CPT | Performed by: PHYSICAL THERAPIST

## 2017-10-23 PROCEDURE — 97110 THERAPEUTIC EXERCISES: CPT | Performed by: PHYSICAL THERAPIST

## 2017-10-23 NOTE — PROGRESS NOTES
Bonnie Jamison  : 1960 [id number] [facility],2Nd Floor at [address] [facility], [address] [place] [address], [address]  [phone]   [phone]         OUTPATIENT PHYSICAL THERAPY:Daily Note 10/23/2017    ICD-10: Treatment Diagnosis: low back pain (M54.5)  Precautions/Allergies:   Review of patient's allergies indicates no known allergies. Fall Risk Score: 2 (? 5 = High Risk)  MD Orders: evaluate and treat MEDICAL/REFERRING DIAGNOSIS:  Low back pain [M54.5]   DATE OF ONSET: chronic   REFERRING PHYSICIAN: Gayatri Coles MD  RETURN PHYSICIAN APPOINTMENT: 10/9/17     INITIAL ASSESSMENT:  Mr. Leela Rothman has attended 13 scheduled PT visits treating low back pain. Overall, he has made slow progress with mobility and strength, however, pain continues to be consistently moderate with occasional exacerbations of low back and left hip. Some symptoms suggested hip pathology as well. He has been educated on body mechanics with house work activities and as shown independence with his HEP and reports compliance with performing at home. Due to left hip and low back pain during weightbearing activates, the ability to progress patient to more functional activities is limited. He would benefit from continued skilled PT 3 x per week to continued working toward d/c goals and return to work, with the addition of aquatic therapy as one of those 3 sessions each week to work on more functional core and hip strength/educrance until able to perform on land. PROBLEM LIST (Impacting functional limitations):  1. Decreased Strength  2. Decreased ADL/Functional Activities  3. Increased Pain  4. Decreased Flexibility/Joint Mobility  5. Decreased [place] with Home Exercise Program INTERVENTIONS PLANNED:  1. Cold  2. Heat  3. Home Exercise Program (HEP)  4. Manual Therapy  5. Neuromuscular Re-education/Strengthening  6. Range of Motion (ROM)  7. Therapeutic Exercise/Strengthening   8.  Aquatic therapy TREATMENT PLAN:  Effective Dates: 10/9/17 TO 12/9/17. Frequency/Duration: 3 times a week for 6 weeks  GOALS: (Goals have been discussed and agreed upon with patient.)  SHORT-TERM FUNCTIONAL GOALS: Time Frame: 2-3 weeks   1. Pt will be independent with HEP focusing on core stability and promoting good spinal alignment. (met)  2. Pt will report no symptoms of LE for 1-2 weeks demonstrating centralization of symptoms. (met)  3. Pt will report pain level does not exceed 5/10 in a 1-2 week period of time to demonstrate pain management. (ongoing)   4. Pt will report improved sleep with less disturbance from back pain. (not met)  DISCHARGE GOALS: Time Frame: 4-6 weeks   1. Pt will improve Oswestry score by at least 10 points. (ongoing)   2. Pt will demonstrate increased AROM of lumbar spine all planes ~ 25% or greater without report of pain to improve functional mobility. (not met)  3. Pt will be able to sustain regular exercise routine including aerobic exercise 3+ times per week without increased symptoms to encourage healthy lifestyle. (not met)   4. Pt will demonstrate increased B LE strength to at least 4+/5 B to aid with functional mobility. (not met)     Rehabilitation Potential For Stated Goals: Good                The information in this section was collected on 8/7/17 (except where otherwise noted). HISTORY:   History of Present Injury/Illness (Reason for Referral):  Pt reports several years low back pain with a severe episode after lifting heavy objects in 2013 causing left low back L LE pain. Pt states that he has been receiving chiropractic treatments since then for pain management however, pain continued to bother him especially during/after work shifts at [employer]. He states that he is on his feet on concrete all day long and recently back pain interfering with job activities. In 3 months ago, pt reports he called off work due to pain too severe to perform work activities.  Went to MD, imaging showing disc protrusion at L3 and stenosis at L1-2,  L5-S1. Referred to neuro specialist. Epidural injection ordered (not scheduled yet). PT referral with Neuro follow up in 4 weeks. Present symptoms (on day of initial evaluation): constant low back aching, mid to low back. Occasional numbness of B LE (R>L)      · Aggravating factors: standing on hard surfaces 5 min, walking, stairs ambulation, bending   · Relieving factors: recliner, pain medication     · Pain level: 6/10 presently, 9/10 worst, 5/10 best     Past Medical History/Comorbidities: Mr. Ricardo Ramirez  has a past medical history of Allergic rhinitis due to allergen; Arthritis; Colon polyps (8/12/2016); Deficiency, lipoprotein (8/12/2016); [facility] (8/12/2016); Encounter for long-term (current) use of medications (8/12/2016); HLD (hyperlipidemia) (8/12/2016); HTN (hypertension) (8/12/2016); Obesity (8/12/2016); Other abnormal glucose (8/12/2016); Sinusitis, acute maxillary (8/12/2016); and URI (upper respiratory infection) (8/12/2016). Mr. Ricardo Ramirez  has a past surgical history that includes tonsillectomy; adenoidectomy; and other surgical (2005). Social History/Living Environment:     lives with spouse   Prior Level of Function/Work/Activity:  Currently on disability   Dominant Side:         RIGHT  Other Clinical Tests:          MRI lumbar spine: (copied from radiology)   Disc desiccation and mild degenerative changes and facet arthropathy  throughout the lumbar spine. There is a significant central stenosis at L1-2;  the thecal sac measures 6 to 7 mm in AP diameter. At L4-3-4 there is a  broad-based left lateral disc protrusion impinging the enlarged L3 nerve root. Moderate foraminal stenoses at L5-S1  Previous Treatment Approaches:          Chiropractor   Current Medications:       Current Outpatient Prescriptions:     multivitamin (ONE A DAY) tablet, Take 1 Tab by mouth daily. , Disp: , Rfl:     Insulin Needles, Disposable, (NOVOFINE 32) 32 gauge x 1/4\" ndle, 1 misc daily. For Victoza pen, Disp: 100 Pen Needle, Rfl: 3    traMADol (ULTRAM) 50 mg tablet, Take 1 Tab by mouth every six (6) hours as needed for Pain. Max Daily Amount: 200 mg. Indications: Pain, Disp: 120 Tab, Rfl: 2    metoprolol succinate (TOPROL-XL) 100 mg tablet, Take 1 Tab by mouth daily. , Disp: 90 Tab, Rfl: 3    metroNIDAZOLE (METROGEL) 1 % topical gel, Apply 1 g to affected area daily. Use a thin layer to affected areas after washing, Disp: 60 g, Rfl: 5    lansoprazole (PREVACID) 30 mg capsule, Take 1 Cap by mouth Daily (before breakfast). , Disp: 90 Cap, Rfl: 3    lisinopril-hydroCHLOROthiazide (PRINZIDE, ZESTORETIC) 20-12.5 mg per tablet, Take 1 Tab by mouth daily. , Disp: 90 Tab, Rfl: 3    simvastatin (ZOCOR) 40 mg tablet, Take 1 Tab by mouth daily. , Disp: 90 Tab, Rfl: 3    Liraglutide (VICTOZA) 0.6 mg/0.1 mL (18 mg/3 mL) sub-q pen, 19mg/3ml pen         1.8mg per day  Indications: type 2 diabetes mellitus, Disp: 9 Syringe, Rfl: 3    doxycycline (ADOXA) 100 mg tablet, Take 1 Tab by mouth two (2) times a day. Indications: ACNE ROSACEA, Disp: 100 Tab, Rfl: 2    PEN NEEDLE, DIABETIC (NOVOFINE 32), 32 g by Does Not Apply route daily.  Indications: 32G x 6 MM,, Disp: , Rfl:       gabapentin 300 mg x 3    Date Last Reviewed:  10/23/2017     EXAMINATION: 10/11/17   Observation/Orthostatic Postural Assessment:          Standing:  · Left scap elevated   · B hips in slight flexion   · Decreased lumbar lordotic curve  · Left lumbar shift   Palpation:         Tender of mid lumbar region   ROM:            Lumbar spine Date:  8/9/17 Date:  10/11/17 Date:     Direction  Parameters Parameters Parameters   Flexion  25% cs 75%    Extension  25% 75%    Rotation  R: 25%  L: 15% R: 50%  L: 25%    Side bending  R: 50%  L: 25% cs  R: 50%  L: 50%    Hip IR Very limited B  Mild-mod limitation     Hip ER Mild limitation B cs Mild limitation cs    Hip flexion  R: 90 degrees  L: 100 degrees cs R: 90 deg  L: 100 deg cs    Cs= comparable sign (symptoms provoked)    Strength:            Lower quadrant    DATE  8/9/17 DATE  10/11/17   Hip flexion R: 4-  L: 4- R: 4 +  L: 4 cs   Hip Abduction  R: 4-  L: 4- R: 4  L: 4   Hip Extension  R: --  L: -- R: --  L: --   Knee Flexion  R: 4  L: 4- R: 4+  L: 4+   Knee Extension  R: 4+  L: 4- R: 4+  L:  4+   Ankle Dorsiflexion  R: 4   L: 4 R: 4+  L: 4+   Ankle Plantarflexion  R: 4  L:4  R: 4+  L: 4+          Special Tests:          None tested   Neurological Screen:        Myotomes: intact         Sensation: intact B LE    Functional Mobility:         Gait/Ambulation:  WBOS, ER B hips, increased lateral sway, reduces significantly with verbal cuing each visit- no carry over          Transfers:  Able to sit to stand 5/5 times without use of UE         Stairs:  Not tested    CLINICAL DECISION MAKING:   Outcome Measure: Tool Used: Modified Oswestry Low Back Pain Questionnaire  Score:  Initial: 38/50  Most Recent: 37/50 (Date: 10/11/17 )   Interpretation of Score: Each section is scored on a 0-5 scale, 5 representing the greatest disability. The scores of each section are added together for a total score of 50. Medical Necessity:   · Patient is expected to demonstrate progress in strength and range of motion to decrease assistance required with ADLs as well as work related activities. .  Reason for Services/Other Comments:  · Patient continues to require modification of therapeutic interventions to increase complexity of exercises. TREATMENT:   (In addition to Assessment/Re-Assessment sessions the following treatments were rendered)  Pre-treatment Symptoms/Complaints:Pt reports L side back and hip pain and tightness continues. He reports he's suprisingly better today with the cool, rainy weather than he would have thought. Pt reports he gets another back injection on 11/10/17.    Pain: Initial: left sided low back and hip pain     5-6/10  Post Session:  4-5/10      THERAPEUTIC EXERCISE: (30 minutes):  Exercises per grid below to improve mobility, strength and coordination. Required moderate visual, verbal and manual cues to promote proper body alignment, promote proper body posture and promote proper body mechanics. Progressed resistance, range and repetitions as indicated.    10/6/17 10/11/17 10/12/17 10/16/17 10/18/17 10/20/17 10/23/17   Activity/Exercise          Education        Importance of drinking water after manual therapy today   LTR     5 x 10 sec B       nustep  X 10 min level 3  X 10 min level 3 X 10 level 4.5 X 10 min level 4 X 10 min level 4  X 12 min level 4.5 X 14min level  4.5   TAs          Supine add sq w/ TAs          Supine TrA with mini march           Supine hip ABD w/ TAs          SKTC          HS/GS str          Anterior/posterior hip stretching    Figure 4 supine 4 x 30 sec with increasing intensity        bridge   X 10, 5 sec holds        multif (opp heel and elbow into mat)--supine           Long axis distraction L LE   Manually x 3 min intermittent    In prone manually  In prone manually   Side stepping  X 60ft   2 x 30 ft each direction  2 x 30 feet  2 x 30 ft with OTB  In ladder x 2 laps no resistance  In ladder x 4 laps, no resistance   Diagonals in ladder       In ladder x 2 L, no resistance   Gait avoiding toe out X 60 ft  X 60 ft    4 x 60 feet focusing on even step length and decreased lateral movement In ladder x 3 laps  In ladder x 3 laps, no resistance   Marching with high knees working on hip/core strength and SLS balance With agility ladder x 5 laps     X 60 ft      Sit-stand from mat at elevated height          Hip ER/IR Manually  X 5 seated  Hook lying and in hip extended    Manually  Manually   Prone ham curls     X 5 X5              Manual 30 min: to decrease joint stiffness and soft tissue restriction     · Long distraction of left hip  · Medial rotation left hip grade III in prone   · CPA's thoracic intervertebral and costovertebral joints B grade IV and III 2 bouts of 20 sec each joint  · STM/MFR to lumbar and thoracic region all plane w. And w/out Hawk  tool  · STM/MFR to LS region and L hip w/ \"the stick\" prior to use of tool   · Kinesiotaping for lumbar paraspinal inhibition (2 \"I\" strips)    Treatment/Session Assessment: Limited mobility remains  throughout thoracolumbar spine and L hip. He remains challenged by side-stepping ladder walking, as well as, diagonals with ladder due to core/glute/hip abductor weakness. Pt's pain and deconditioning may limit progress toward current goals. · Response to Treatment: Crepitus and erythema noted along L side paraspinals with use of hawk  tool. Pt subjectively denied pain or soreness during manual treatment today, however, c/o L hip soreness with diagonals. · Recommendations/Intent for next treatment session: Progress standing exercises and functional strength training as pt is able. Assess effectiveness of hawk  tool and kinesiotape. Consider prone mechanical traction to aide with improving mobility.      Future Appointments  Date Time Provider Jonas Denton   10/25/2017 11:00 AM Nathanial Severin, MD [facility]   10/27/2017 10:00 AM Shefali Calle, PT [facility]   10/30/2017 10:00 AM Shefali Calle, PT [facility]   11/1/2017 1:30 PM Sania Hernandez, PT [facility]   11/3/2017 10:00 AM Shefali Calle, PT [facility]   11/6/2017 10:00 AM Shefali Calle, PT [facility]   11/13/2017 10:00 AM Shefali Calle, PT [facility]   11/17/2017 10:00 AM Shefali Calle, PT [facility]   2/20/2018 8:10 AM [facility]   2/23/2018 3:30 PM Nathanial Severin, MD [facility]       Total Treatment Duration:  PT Patient Time In/Time Out  Time In: 1000  Time Out: 100 Erika Clark, PT

## 2017-10-25 ENCOUNTER — APPOINTMENT (OUTPATIENT)
Dept: PHYSICAL THERAPY | Age: 57
End: 2017-10-25
Payer: COMMERCIAL

## 2017-10-27 ENCOUNTER — HOSPITAL ENCOUNTER (OUTPATIENT)
Dept: PHYSICAL THERAPY | Age: 57
Discharge: HOME | End: 2017-10-27
Payer: COMMERCIAL

## 2017-10-27 PROCEDURE — 97110 THERAPEUTIC EXERCISES: CPT

## 2017-10-27 PROCEDURE — 97012 MECHANICAL TRACTION THERAPY: CPT

## 2017-10-27 NOTE — PROGRESS NOTES
Georgia Limb  : 1960 [address] at [address] [facility], [address] [place] [address], [place], [address] [place] [address]  [phone]   [phone]         OUTPATIENT PHYSICAL THERAPY:Daily Note 10/27/2017    ICD-10: Treatment Diagnosis: low back pain (M54.5)  Precautions/Allergies:   Review of patient's allergies indicates no known allergies. Fall Risk Score: 2 (? 5 = High Risk)  MD Orders: evaluate and treat MEDICAL/REFERRING DIAGNOSIS:  Low back pain [M54.5]   DATE OF ONSET: chronic   REFERRING PHYSICIAN: Angelica Ravi MD  RETURN PHYSICIAN APPOINTMENT: 10/9/17     INITIAL ASSESSMENT:  Mr. Mihir Celestin has attended 13 scheduled PT visits treating low back pain. Overall, he has made slow progress with mobility and strength, however, pain continues to be consistently moderate with occasional exacerbations of low back and left hip. Some symptoms suggested hip pathology as well. He has been educated on body mechanics with house work activities and as shown independence with his HEP and reports compliance with performing at home. Due to left hip and low back pain during weightbearing activates, the ability to progress patient to more functional activities is limited. He would benefit from continued skilled PT 3 x per week to continued working toward d/c goals and return to work, with the addition of aquatic therapy as one of those 3 sessions each week to work on more functional core and hip strength/educrance until able to perform on land. PROBLEM LIST (Impacting functional limitations):  1. Decreased Strength  2. Decreased ADL/Functional Activities  3. Increased Pain  4. Decreased Flexibility/Joint Mobility  5. Decreased [place] with Home Exercise Program INTERVENTIONS PLANNED:  1. Cold  2. Heat  3. Home Exercise Program (HEP)  4. Manual Therapy  5. Neuromuscular Re-education/Strengthening  6. Range of Motion (ROM)  7. Therapeutic Exercise/Strengthening   8.  Aquatic therapy TREATMENT PLAN:  Effective Dates: 10/9/17 TO 12/9/17. Frequency/Duration: 3 times a week for 6 weeks  GOALS: (Goals have been discussed and agreed upon with patient.)  SHORT-TERM FUNCTIONAL GOALS: Time Frame: 2-3 weeks   1. Pt will be independent with HEP focusing on core stability and promoting good spinal alignment. (met)  2. Pt will report no symptoms of LE for 1-2 weeks demonstrating centralization of symptoms. (met)  3. Pt will report pain level does not exceed 5/10 in a 1-2 week period of time to demonstrate pain management. (ongoing)   4. Pt will report improved sleep with less disturbance from back pain. (not met)  DISCHARGE GOALS: Time Frame: 4-6 weeks   1. Pt will improve Oswestry score by at least 10 points. (ongoing)   2. Pt will demonstrate increased AROM of lumbar spine all planes ~ 25% or greater without report of pain to improve functional mobility. (not met)  3. Pt will be able to sustain regular exercise routine including aerobic exercise 3+ times per week without increased symptoms to encourage healthy lifestyle. (not met)   4. Pt will demonstrate increased B LE strength to at least 4+/5 B to aid with functional mobility. (not met)     Rehabilitation Potential For Stated Goals: Good                The information in this section was collected on 8/7/17 (except where otherwise noted). HISTORY:   History of Present Injury/Illness (Reason for Referral):  Pt reports several years low back pain with a severe episode after lifting heavy objects in 2013 causing left low back L LE pain. Pt states that he has been receiving chiropractic treatments since then for pain management however, pain continued to bother him especially during/after work shifts at [employer]. He states that he is on his feet on concrete all day long and recently back pain interfering with job activities. In 3 months ago, pt reports he called off work due to pain too severe to perform work activities.  Went to MD, imaging showing disc protrusion at L3 and stenosis at L1-2,  L5-S1. Referred to neuro specialist. Epidural injection ordered (not scheduled yet). PT referral with Neuro follow up in 4 weeks. Present symptoms (on day of initial evaluation): constant low back aching, mid to low back. Occasional numbness of B LE (R>L)      · Aggravating factors: standing on hard surfaces 5 min, walking, stairs ambulation, bending   · Relieving factors: recliner, pain medication     · Pain level: 6/10 presently, 9/10 worst, 5/10 best     Past Medical History/Comorbidities: Mr. Marroquin  has a past medical history of Allergic rhinitis due to allergen; Arthritis; Colon polyps (8/12/2016); Deficiency, lipoprotein (8/12/2016); [facility] (8/12/2016); Encounter for long-term (current) use of medications (8/12/2016); HLD (hyperlipidemia) (8/12/2016); HTN (hypertension) (8/12/2016); Obesity (8/12/2016); Other abnormal glucose (8/12/2016); Sinusitis, acute maxillary (8/12/2016); and URI (upper respiratory infection) (8/12/2016). Mr. Marroquin  has a past surgical history that includes tonsillectomy; adenoidectomy; and other surgical (2005). Social History/Living Environment:     lives with spouse   Prior Level of Function/Work/Activity:  Currently on disability   Dominant Side:         RIGHT  Other Clinical Tests:          MRI lumbar spine: (copied from radiology)   Disc desiccation and mild degenerative changes and facet arthropathy  throughout the lumbar spine. There is a significant central stenosis at L1-2;  the thecal sac measures 6 to 7 mm in AP diameter. At L4-3-4 there is a  broad-based left lateral disc protrusion impinging the enlarged L3 nerve root. Moderate foraminal stenoses at L5-S1. Previous Treatment Approaches:          Chiropractor   Current Medications:       Current Outpatient Prescriptions:     FLUoxetine (PROZAC) 20 mg tablet, Take 1 Tab by mouth daily. , Disp: 90 Tab, Rfl: 3    multivitamin (ONE A DAY) tablet, Take 1 Tab by mouth daily. , Disp: , Rfl:     Insulin Needles, Disposable, (NOVOFINE 32) 32 gauge x 1/4\" ndle, 1 misc daily. For Victoza pen, Disp: 100 Pen Needle, Rfl: 3    traMADol (ULTRAM) 50 mg tablet, Take 1 Tab by mouth every six (6) hours as needed for Pain. Max Daily Amount: 200 mg. Indications: Pain, Disp: 120 Tab, Rfl: 2    metoprolol succinate (TOPROL-XL) 100 mg tablet, Take 1 Tab by mouth daily. , Disp: 90 Tab, Rfl: 3    metroNIDAZOLE (METROGEL) 1 % topical gel, Apply 1 g to affected area daily. Use a thin layer to affected areas after washing, Disp: 60 g, Rfl: 5    lansoprazole (PREVACID) 30 mg capsule, Take 1 Cap by mouth Daily (before breakfast). , Disp: 90 Cap, Rfl: 3    lisinopril-hydroCHLOROthiazide (PRINZIDE, ZESTORETIC) 20-12.5 mg per tablet, Take 1 Tab by mouth daily. , Disp: 90 Tab, Rfl: 3    simvastatin (ZOCOR) 40 mg tablet, Take 1 Tab by mouth daily. , Disp: 90 Tab, Rfl: 3    Liraglutide (VICTOZA) 0.6 mg/0.1 mL (18 mg/3 mL) sub-q pen, 19mg/3ml pen         1.8mg per day  Indications: type 2 diabetes mellitus, Disp: 9 Syringe, Rfl: 3    doxycycline (ADOXA) 100 mg tablet, Take 1 Tab by mouth two (2) times a day. Indications: ACNE ROSACEA, Disp: 100 Tab, Rfl: 2    PEN NEEDLE, DIABETIC (NOVOFINE 32), 32 g by Does Not Apply route daily.  Indications: 32G x 6 MM,, Disp: , Rfl:       gabapentin 300 mg x 3    Date Last Reviewed:  10/27/2017     EXAMINATION: 10/11/17   Observation/Orthostatic Postural Assessment:          Standing:  · Left scap elevated   · B hips in slight flexion   · Decreased lumbar lordotic curve  · Left lumbar shift   Palpation:         Tender of mid lumbar region   ROM:            Lumbar spine Date:  8/9/17 Date:  10/11/17 Date:     Direction  Parameters Parameters Parameters   Flexion  25% cs 75%    Extension  25% 75%    Rotation  R: 25%  L: 15% R: 50%  L: 25%    Side bending  R: 50%  L: 25% cs  R: 50%  L: 50%    Hip IR Very limited B  Mild-mod limitation     Hip ER Mild limitation B cs Mild limitation cs    Hip flexion  R: 90 degrees  L: 100 degrees cs R: 90 deg  L: 100 deg cs    Cs= comparable sign (symptoms provoked)    Strength:            Lower quadrant    DATE  8/9/17 DATE  10/11/17   Hip flexion R: 4-  L: 4- R: 4 +  L: 4 cs   Hip Abduction  R: 4-  L: 4- R: 4  L: 4   Hip Extension  R: --  L: -- R: --  L: --   Knee Flexion  R: 4  L: 4- R: 4+  L: 4+   Knee Extension  R: 4+  L: 4- R: 4+  L:  4+   Ankle Dorsiflexion  R: 4   L: 4 R: 4+  L: 4+   Ankle Plantarflexion  R: 4  L:4  R: 4+  L: 4+          Special Tests:          None tested   Neurological Screen:        Myotomes: intact         Sensation: intact B LE    Functional Mobility:         Gait/Ambulation:  WBOS, ER B hips, increased lateral sway, reduces significantly with verbal cuing each visit- no carry over          Transfers:  Able to sit to stand 5/5 times without use of UE         Stairs:  Not tested    CLINICAL DECISION MAKING:   Outcome Measure: Tool Used: Modified Oswestry Low Back Pain Questionnaire  Score:  Initial: 38/50  Most Recent: 37/50 (Date: 10/11/17 )   Interpretation of Score: Each section is scored on a 0-5 scale, 5 representing the greatest disability. The scores of each section are added together for a total score of 50. Medical Necessity:   · Patient is expected to demonstrate progress in strength and range of motion to decrease assistance required with ADLs as well as work related activities. .  Reason for Services/Other Comments:  · Patient continues to require modification of therapeutic interventions to increase complexity of exercises. TREATMENT:   (In addition to Assessment/Re-Assessment sessions the following treatments were rendered)  Pre-treatment Symptoms/Complaints:pt states that he was less sore after last visit than he usually is. He is not sure if the KT tape has changed his symptoms very much. Agreeable to have trial of mechanical traction today.  Aquatic therapy beginning next week (1 x per week) his family MD agrees with aquatic therapy as well. Pain: Initial: left sided low back and hip pain     7/10  Post Session:  5-6/10      THERAPEUTIC EXERCISE: (25 minutes):  Exercises per grid below to improve mobility, strength and coordination. Required moderate visual, verbal and manual cues to promote proper body alignment, promote proper body posture and promote proper body mechanics. Progressed resistance, range and repetitions as indicated.    10/6/17 10/11/17 10/12/17 10/16/17 10/18/17 10/20/17 10/23/17 10/27/17   Activity/Exercise           Education        Importance of drinking water after manual therapy today Traction treatment    LTR     5 x 10 sec B        nustep  X 10 min level 3  X 10 min level 3 X 10 level 4.5 X 10 min level 4 X 10 min level 4  X 12 min level 4.5 X 14min level  4.5 13 min level 4.5   TAs           Supine add sq w/ TAs           Supine TrA with mini march            Supine hip ABD w/ TAs           SKTC           HS/GS str           Anterior/posterior hip stretching    Figure 4 supine 4 x 30 sec with increasing intensity         bridge   X 10, 5 sec holds      X 5, 10 sec hold    multif (opp heel and elbow into mat)--supine            Long axis distraction L LE   Manually x 3 min intermittent    In prone manually  In prone manually    Side stepping  X 60ft   2 x 30 ft each direction  2 x 30 feet  2 x 30 ft with OTB  In ladder x 2 laps no resistance  In ladder x 4 laps, no resistance    Diagonals in ladder       In ladder x 2 L, no resistance    Gait avoiding toe out X 60 ft  X 60 ft    4 x 60 feet focusing on even step length and decreased lateral movement In ladder x 3 laps  In ladder x 3 laps, no resistance In hallway x 60 ft    Marching with high knees working on hip/core strength and SLS balance With agility ladder x 5 laps     X 60 ft       Sit-stand from mat at elevated height           Hip ER/IR Manually  X 5 seated  Hook lying and in hip extended    Manually  Manually    Prone ham curls     X 5 X5                Manual 0 min: to decrease joint stiffness and soft tissue restriction     · Long distraction of left hip  · Medial rotation left hip grade III in prone   · CPA's thoracic intervertebral and costovertebral joints B grade IV and III 2 bouts of 20 sec each joint  · STM/MFR to lumbar and thoracic region all plane w. And w/out Hawk  tool  · STM/MFR to LS region and L hip w/ \"the stick\" prior to use of tool   · Kinesiotaping for lumbar paraspinal inhibition (2 \"I\" strips)    Modalities: total of 25 min with set up   Mechanical lumbar traction x 15 min, 76 lbs/30 lbs, 60 sec/ 30 sec  --To improve intervertebral joint mechanics and decrease stress upon discs and nerve roots. Ice to lumbar and sacral regions       Treatment/Session Assessment: traction trial today to relieve intervertebral pressure. · Response to Treatment:  Decrease pain level after traction but reports central mid back soreness post treatment. Encouraged pt to ice at home frequently over next 24 hours to reduce soreness. · Recommendations/Intent for next treatment session:  Assess effects of traction treatment and perform again making modifications as appropriate. Continue exercises for stretching and strengthening focusing on T-L spine as well as hip joint.      Future Appointments  Date Time Provider Jonas Denton   10/30/2017 10:00 AM Russell Osorio PT [facility] [place] [facility]   11/1/2017 1:30 PM [facility]   11/3/2017 10:00 AM [facility]   11/6/2017 10:00 AM Russell Osorio PT [facility]   11/13/2017 10:00 AM Russell Osorio PT [facility]   11/17/2017 10:00 AM Russell Osorio PT [facility]   1/18/2018 9:20 AM P[facility] LAB PIE PIE   1/25/2018 2:30 PM MD LUCILA Germain   2/20/2018 8:10 AM PIE LAB PIE PIE   2/23/2018 3:30 PM MD LUCILA Germain       Total Treatment Duration:  PT Patient Time In/Time Out  Time In: 1000  Time Out: Jessenia 606

## 2017-10-30 ENCOUNTER — HOSPITAL ENCOUNTER (OUTPATIENT)
Dept: PHYSICAL THERAPY | Age: 57
Discharge: HOME | End: 2017-10-30
Payer: COMMERCIAL

## 2017-10-30 NOTE — PROGRESS NOTES
Matt Shen  : 1960  Payor: Norma Mcgraw / Plan: [facility] / Product Type: PPO /  [id number] [facility] [address]. [address] [place] [address] [address] [place] [address], [address]  [phone]   [phone]        OUTPATIENT DAILY NOTE    NAME/AGE/GENDER: Matt Shen is a 61 y.o. male. DATE: 10/30/2017    Patient cancelled for appointment today due to illness. Will plan to follow up on next scheduled visit.     Nevaeh Quintero, PT

## 2017-11-01 ENCOUNTER — HOSPITAL ENCOUNTER (OUTPATIENT)
Dept: PHYSICAL THERAPY | Age: 57
Discharge: HOME | End: 2017-11-01
Payer: COMMERCIAL

## 2017-11-01 PROCEDURE — 97113 AQUATIC THERAPY/EXERCISES: CPT

## 2017-11-01 NOTE — PROGRESS NOTES
Genora Quivers  : 1960 [address] at [address], [address] [place] [address], [address]  [phone]   [phone]         OUTPATIENT PHYSICAL THERAPY:Daily Note 2017    ICD-10: Treatment Diagnosis: low back pain (M54.5)  Precautions/Allergies:   Review of patient's allergies indicates no known allergies. Fall Risk Score: 2 (? 5 = High Risk)  MD Orders: evaluate and treat MEDICAL/REFERRING DIAGNOSIS:  Low back pain [M54.5]   DATE OF ONSET: chronic   REFERRING PHYSICIAN: Danielle Hidalgo MD  RETURN PHYSICIAN APPOINTMENT: 10/9/17     INITIAL ASSESSMENT:  Mr. Aminata Rodriguez has attended 13 scheduled PT visits treating low back pain. Overall, he has made slow progress with mobility and strength, however, pain continues to be consistently moderate with occasional exacerbations of low back and left hip. Some symptoms suggested hip pathology as well. He has been educated on body mechanics with house work activities and as shown independence with his HEP and reports compliance with performing at home. Due to left hip and low back pain during weightbearing activates, the ability to progress patient to more functional activities is limited. He would benefit from continued skilled PT 3 x per week to continued working toward d/c goals and return to work, with the addition of aquatic therapy as one of those 3 sessions each week to work on more functional core and hip strength/educrance until able to perform on land. PROBLEM LIST (Impacting functional limitations):  1. Decreased Strength  2. Decreased ADL/Functional Activities  3. Increased Pain  4. Decreased Flexibility/Joint Mobility  5. Decreased Sieper with Home Exercise Program INTERVENTIONS PLANNED:  1. Cold  2. Heat  3. Home Exercise Program (HEP)  4. Manual Therapy  5. Neuromuscular Re-education/Strengthening  6. Range of Motion (ROM)  7. Therapeutic Exercise/Strengthening   8.  Aquatic therapy TREATMENT PLAN:  Effective Dates: 10/9/17 TO 12/9/17. Frequency/Duration: 3 times a week for 6 weeks  GOALS: (Goals have been discussed and agreed upon with patient.)  SHORT-TERM FUNCTIONAL GOALS: Time Frame: 2-3 weeks   1. Pt will be independent with HEP focusing on core stability and promoting good spinal alignment. (met)  2. Pt will report no symptoms of LE for 1-2 weeks demonstrating centralization of symptoms. (met)  3. Pt will report pain level does not exceed 5/10 in a 1-2 week period of time to demonstrate pain management. (ongoing)   4. Pt will report improved sleep with less disturbance from back pain. (not met)  DISCHARGE GOALS: Time Frame: 4-6 weeks   1. Pt will improve Oswestry score by at least 10 points. (ongoing)   2. Pt will demonstrate increased AROM of lumbar spine all planes ~ 25% or greater without report of pain to improve functional mobility. (not met)  3. Pt will be able to sustain regular exercise routine including aerobic exercise 3+ times per week without increased symptoms to encourage healthy lifestyle. (not met)   4. Pt will demonstrate increased B LE strength to at least 4+/5 B to aid with functional mobility. (not met)     Rehabilitation Potential For Stated Goals: Good                The information in this section was collected on 8/7/17 (except where otherwise noted). HISTORY:   History of Present Injury/Illness (Reason for Referral):  Pt reports several years low back pain with a severe episode after lifting heavy objects in 2013 causing left low back L LE pain. Pt states that he has been receiving chiropractic treatments since then for pain management however, pain continued to bother him especially during/after work shifts at [employer]. He states that he is on his feet on concrete all day long and recently back pain interfering with job activities. In 3 months ago, pt reports he called off work due to pain too severe to perform work activities.  Went to MD, imaging showing disc protrusion at L3 and stenosis at L1-2,  L5-S1. Referred to neuro specialist. Epidural injection ordered (not scheduled yet). PT referral with Neuro follow up in 4 weeks. Present symptoms (on day of initial evaluation): constant low back aching, mid to low back. Occasional numbness of B LE (R>L)      · Aggravating factors: standing on hard surfaces 5 min, walking, stairs ambulation, bending   · Relieving factors: recliner, pain medication     · Pain level: 6/10 presently, 9/10 worst, 5/10 best     Past Medical History/Comorbidities: Mr. Leela Rothman  has a past medical history of Allergic rhinitis due to allergen; Arthritis; Colon polyps (8/12/2016); Deficiency, lipoprotein (8/12/2016); Diabetes [facility] type 2, [facility] (8/12/2016); Encounter for long-term (current) use of medications (8/12/2016); HLD (hyperlipidemia) (8/12/2016); HTN (hypertension) (8/12/2016); Obesity (8/12/2016); Other abnormal glucose (8/12/2016); Sinusitis, acute maxillary (8/12/2016); and URI (upper respiratory infection) (8/12/2016). Mr. Leela Rothman  has a past surgical history that includes tonsillectomy; adenoidectomy; and other surgical (2005). Social History/Living Environment:     lives with spouse   Prior Level of Function/Work/Activity:  Currently on disability   Dominant Side:         RIGHT  Other Clinical Tests:          MRI lumbar spine: (copied from radiology)   Disc desiccation and mild degenerative changes and facet arthropathy  throughout the lumbar spine. There is a significant central stenosis at L1-2;  the thecal sac measures 6 to 7 mm in AP diameter. At L4-3-4 there is a  broad-based left lateral disc protrusion impinging the enlarged L3 nerve root. Moderate foraminal stenoses at L5-S1. Previous Treatment Approaches:          Chiropractor   Current Medications:       Current Outpatient Prescriptions:     FLUoxetine (PROZAC) 20 mg tablet, Take 1 Tab by mouth daily. , Disp: 90 Tab, Rfl: 3    multivitamin (ONE A DAY) tablet, Take 1 Tab by mouth daily. , Disp: , Rfl:     Insulin Needles, Disposable, (NOVOFINE 32) 32 gauge x 1/4\" ndle, 1 misc daily. For Victoza pen, Disp: 100 Pen Needle, Rfl: 3    traMADol (ULTRAM) 50 mg tablet, Take 1 Tab by mouth every six (6) hours as needed for Pain. Max Daily Amount: 200 mg. Indications: Pain, Disp: 120 Tab, Rfl: 2    metoprolol succinate (TOPROL-XL) 100 mg tablet, Take 1 Tab by mouth daily. , Disp: 90 Tab, Rfl: 3    metroNIDAZOLE (METROGEL) 1 % topical gel, Apply 1 g to affected area daily. Use a thin layer to affected areas after washing, Disp: 60 g, Rfl: 5    lansoprazole (PREVACID) 30 mg capsule, Take 1 Cap by mouth Daily (before breakfast). , Disp: 90 Cap, Rfl: 3    lisinopril-hydroCHLOROthiazide (PRINZIDE, ZESTORETIC) 20-12.5 mg per tablet, Take 1 Tab by mouth daily. , Disp: 90 Tab, Rfl: 3    simvastatin (ZOCOR) 40 mg tablet, Take 1 Tab by mouth daily. , Disp: 90 Tab, Rfl: 3    Liraglutide (VICTOZA) 0.6 mg/0.1 mL (18 mg/3 mL) sub-q pen, 19mg/3ml pen         1.8mg per day  Indications: type 2 diabetes mellitus, Disp: 9 Syringe, Rfl: 3    doxycycline (ADOXA) 100 mg tablet, Take 1 Tab by mouth two (2) times a day. Indications: ACNE ROSACEA, Disp: 100 Tab, Rfl: 2    PEN NEEDLE, DIABETIC (NOVOFINE 32), 32 g by Does Not Apply route daily.  Indications: 32G x 6 MM,, Disp: , Rfl:       gabapentin 300 mg x 3    Date Last Reviewed:  11/1/2017     EXAMINATION: 10/11/17   Observation/Orthostatic Postural Assessment:          Standing:  · Left scap elevated   · B hips in slight flexion   · Decreased lumbar lordotic curve  · Left lumbar shift   Palpation:         Tender of mid lumbar region   ROM:            Lumbar spine Date:  8/9/17 Date:  10/11/17 Date:     Direction  Parameters Parameters Parameters   Flexion  25% cs 75%    Extension  25% 75%    Rotation  R: 25%  L: 15% R: 50%  L: 25%    Side bending  R: 50%  L: 25% cs  R: 50%  L: 50%    Hip IR Very limited B  Mild-mod limitation     Hip ER Mild limitation B cs Mild limitation cs    Hip flexion  R: 90 degrees  L: 100 degrees cs R: 90 deg  L: 100 deg cs    Cs= comparable sign (symptoms provoked)    Strength:            Lower quadrant    DATE  8/9/17 DATE  10/11/17   Hip flexion R: 4-  L: 4- R: 4 +  L: 4 cs   Hip Abduction  R: 4-  L: 4- R: 4  L: 4   Hip Extension  R: --  L: -- R: --  L: --   Knee Flexion  R: 4  L: 4- R: 4+  L: 4+   Knee Extension  R: 4+  L: 4- R: 4+  L:  4+   Ankle Dorsiflexion  R: 4   L: 4 R: 4+  L: 4+   Ankle Plantarflexion  R: 4  L:4  R: 4+  L: 4+          Special Tests:          None tested   Neurological Screen:        Myotomes: intact         Sensation: intact B LE    Functional Mobility:         Gait/Ambulation:  WBOS, ER B hips, increased lateral sway, reduces significantly with verbal cuing each visit- no carry over          Transfers:  Able to sit to stand 5/5 times without use of UE         Stairs:  Not tested    CLINICAL DECISION MAKING:   Outcome Measure: Tool Used: Modified Oswestry Low Back Pain Questionnaire  Score:  Initial: 38/50  Most Recent: 37/50 (Date: 10/11/17 )   Interpretation of Score: Each section is scored on a 0-5 scale, 5 representing the greatest disability. The scores of each section are added together for a total score of 50. Medical Necessity:   · Patient is expected to demonstrate progress in strength and range of motion to decrease assistance required with ADLs as well as work related activities. .  Reason for Services/Other Comments:  · Patient continues to require modification of therapeutic interventions to increase complexity of exercises. TREATMENT:   (In addition to Assessment/Re-Assessment sessions the following treatments were rendered)  Pre-treatment Symptoms/Complaints:pt states that he was sore after traction but he expected that. Pain: Initial: left sided low back and hip pain     7/10  Post Session:  5/10 back,  No hip pain      Aquatic Therapy (45 minutes):  Aquatic treatment performed per flow grid for Decreased activity endurance, Decompression, Ease of movement and Low impact and reduced weight bearing activity. Cues provided for posture, ex and gait. Aquatic Exercise Log       Date  11/1 Date   Date   Date   Date     Activity/ Exercise Parameters Parameters Parameters Parameters Parameters   Walking forward 6       Walking backward 4       Walking sideways 4         Marching 4         Goose Step          Tip toes          Heels          Lunges        Side step squats        LE Exercises          Hip Flex/Ext 10         Hip Abd/Add 10         Hip IR/ER          Calf raises 10 with stretch off edge of step         Knee Flex 10         Squats          Leg Circles 10/10         Step Ups 10       UE Exercises          Squeeze In          Push Down          Pull Down          Bicep/Tricep        Rows/Press outs         Chi Positions          Trunk Rotation        Deep H2O/ Noodles          Stabilization          Arms only          Legs only        Andrea Thomas walk        Lower abdominal   work           Cardio          Jogging        Lap   Swimming          Stretches          Hamstrings 3 x 20 sec         Heelcords In lunge position on step 3 x 20sec         Piriformis          Neck            Treatment/Session Assessment: Pt felt better after pool therapy. · Response to Treatment:  Decrease pain level after traction but reports central mid back soreness post treatment. Encouraged pt to ice at home frequently over next 24 hours to reduce soreness. · Recommendations/Intent for next treatment session:  Assess effects of traction treatment and perform again making modifications as appropriate. Continue exercises for stretching and strengthening focusing on T-L spine as well as hip joint. Continue aquatic once per week.     Future Appointments  Date Time Provider Jonas Denton   11/3/2017 10:00 AM Harry Ballard, PT [facility]   11/6/2017 10:00 AM Mary Kay Avila, PT [facility]   11/13/2017 10:00 AM Mary Kay Avila, PT [facility]   11/17/2017 10:00 AM Mary Kay Avila, PT [facility]   1/18/2018 9:20 AM [facility]   1/25/2018 2:30 PM Nat Ivy MD [facility]   2/20/2018 8:10 AM [facility]   2/23/2018 3:30 PM Nat Ivy MD P[facility]       Total Treatment Duration:  45 min       Felix Robbins, PT

## 2017-11-03 ENCOUNTER — HOSPITAL ENCOUNTER (OUTPATIENT)
Dept: PHYSICAL THERAPY | Age: 57
Discharge: HOME | End: 2017-11-03
Payer: COMMERCIAL

## 2017-11-03 PROCEDURE — 97110 THERAPEUTIC EXERCISES: CPT

## 2017-11-03 PROCEDURE — 97012 MECHANICAL TRACTION THERAPY: CPT

## 2017-11-03 NOTE — PROGRESS NOTES
Vishal Burnett  : 1960 [id number] at [address] [place] [address], [address] [place] [address]  [phone]   [phone]         OUTPATIENT PHYSICAL THERAPY:Daily Note 11/3/2017    ICD-10: Treatment Diagnosis: low back pain (M54.5)  Precautions/Allergies:   Review of patient's allergies indicates no known allergies. Fall Risk Score: 2 (? 5 = High Risk)  MD Orders: evaluate and treat MEDICAL/REFERRING DIAGNOSIS:  Low back pain [M54.5]   DATE OF ONSET: chronic   REFERRING PHYSICIAN: Corona Luz MD  RETURN PHYSICIAN APPOINTMENT: 10/9/17     INITIAL ASSESSMENT:  Mr. Bee Miranda has attended 13 scheduled PT visits treating low back pain. Overall, he has made slow progress with mobility and strength, however, pain continues to be consistently moderate with occasional exacerbations of low back and left hip. Some symptoms suggested hip pathology as well. He has been educated on body mechanics with house work activities and as shown independence with his HEP and reports compliance with performing at home. Due to left hip and low back pain during weightbearing activates, the ability to progress patient to more functional activities is limited. He would benefit from continued skilled PT 3 x per week to continued working toward d/c goals and return to work, with the addition of aquatic therapy as one of those 3 sessions each week to work on more functional core and hip strength/educrance until able to perform on land. PROBLEM LIST (Impacting functional limitations):  1. Decreased Strength  2. Decreased ADL/Functional Activities  3. Increased Pain  4. Decreased Flexibility/Joint Mobility  5. Decreased [place] with Home Exercise Program INTERVENTIONS PLANNED:  1. Cold  2. Heat  3. Home Exercise Program (HEP)  4. Manual Therapy  5. Neuromuscular Re-education/Strengthening  6. Range of Motion (ROM)  7. Therapeutic Exercise/Strengthening   8.  Aquatic therapy TREATMENT PLAN:  Effective Dates: 10/9/17 TO 12/9/17. Frequency/Duration: 3 times a week for 6 weeks  GOALS: (Goals have been discussed and agreed upon with patient.)  SHORT-TERM FUNCTIONAL GOALS: Time Frame: 2-3 weeks   1. Pt will be independent with HEP focusing on core stability and promoting good spinal alignment. (met)  2. Pt will report no symptoms of LE for 1-2 weeks demonstrating centralization of symptoms. (met)  3. Pt will report pain level does not exceed 5/10 in a 1-2 week period of time to demonstrate pain management. (ongoing)   4. Pt will report improved sleep with less disturbance from back pain. (not met)  DISCHARGE GOALS: Time Frame: 4-6 weeks   1. Pt will improve Oswestry score by at least 10 points. (ongoing)   2. Pt will demonstrate increased AROM of lumbar spine all planes ~ 25% or greater without report of pain to improve functional mobility. (not met)  3. Pt will be able to sustain regular exercise routine including aerobic exercise 3+ times per week without increased symptoms to encourage healthy lifestyle. (not met)   4. Pt will demonstrate increased B LE strength to at least 4+/5 B to aid with functional mobility. (not met)     Rehabilitation Potential For Stated Goals: Good                The information in this section was collected on 8/7/17 (except where otherwise noted). HISTORY:   History of Present Injury/Illness (Reason for Referral):  Pt reports several years low back pain with a severe episode after lifting heavy objects in 2013 causing left low back L LE pain. Pt states that he has been receiving chiropractic treatments since then for pain management however, pain continued to bother him especially during/after work shifts at [employer]. He states that he is on his feet on concrete all day long and recently back pain interfering with job activities. In 3 months ago, pt reports he called off work due to pain too severe to perform work activities.  Went to MD, imaging showing disc protrusion at L3 and stenosis at L1-2,  L5-S1. Referred to neuro specialist. Epidural injection ordered (not scheduled yet). PT referral with Neuro follow up in 4 weeks. Present symptoms (on day of initial evaluation): constant low back aching, mid to low back. Occasional numbness of B LE (R>L)      · Aggravating factors: standing on hard surfaces 5 min, walking, stairs ambulation, bending   · Relieving factors: recliner, pain medication     · Pain level: 6/10 presently, 9/10 worst, 5/10 best     Past Medical History/Comorbidities: Mr. Julia Pantoja  has a past medical history of Allergic rhinitis due to allergen; Arthritis; Colon polyps (8/12/2016); Deficiency, lipoprotein (8/12/2016); Diabetes mellitus type 2, controlled ([facility] [place] [facility] (8/12/2016); Encounter for long-term (current) use of medications (8/12/2016); HLD (hyperlipidemia) (8/12/2016); HTN (hypertension) (8/12/2016); Obesity (8/12/2016); Other abnormal glucose (8/12/2016); Sinusitis, acute maxillary (8/12/2016); and URI (upper respiratory infection) (8/12/2016). Mr. Julia Pantoja  has a past surgical history that includes tonsillectomy; adenoidectomy; and other surgical (2005). Social History/Living Environment:     lives with spouse   Prior Level of Function/Work/Activity:  Currently on disability   Dominant Side:         RIGHT  Other Clinical Tests:          MRI lumbar spine: (copied from radiology)   Disc desiccation and mild degenerative changes and facet arthropathy  throughout the lumbar spine. There is a significant central stenosis at L1-2;  the thecal sac measures 6 to 7 mm in AP diameter. At L4-3-4 there is a  broad-based left lateral disc protrusion impinging the enlarged L3 nerve root. Moderate foraminal stenoses at L5-S1. Previous Treatment Approaches:          Chiropractor   Current Medications:       Current Outpatient Prescriptions:     FLUoxetine (PROZAC) 20 mg tablet, Take 1 Tab by mouth daily. , Disp: 90 Tab, Rfl: 3    multivitamin (ONE A DAY) tablet, Take 1 Tab by mouth daily. , Disp: , Rfl:     Insulin Needles, Disposable, (NOVOFINE 32) 32 gauge x 1/4\" ndle, 1 misc daily. For Victoza pen, Disp: 100 Pen Needle, Rfl: 3    traMADol (ULTRAM) 50 mg tablet, Take 1 Tab by mouth every six (6) hours as needed for Pain. Max Daily Amount: 200 mg. Indications: Pain, Disp: 120 Tab, Rfl: 2    metoprolol succinate (TOPROL-XL) 100 mg tablet, Take 1 Tab by mouth daily. , Disp: 90 Tab, Rfl: 3    metroNIDAZOLE (METROGEL) 1 % topical gel, Apply 1 g to affected area daily. Use a thin layer to affected areas after washing, Disp: 60 g, Rfl: 5    lansoprazole (PREVACID) 30 mg capsule, Take 1 Cap by mouth Daily (before breakfast). , Disp: 90 Cap, Rfl: 3    lisinopril-hydroCHLOROthiazide (PRINZIDE, ZESTORETIC) 20-12.5 mg per tablet, Take 1 Tab by mouth daily. , Disp: 90 Tab, Rfl: 3    simvastatin (ZOCOR) 40 mg tablet, Take 1 Tab by mouth daily. , Disp: 90 Tab, Rfl: 3    Liraglutide (VICTOZA) 0.6 mg/0.1 mL (18 mg/3 mL) sub-q pen, 19mg/3ml pen         1.8mg per day  Indications: type 2 diabetes mellitus, Disp: 9 Syringe, Rfl: 3    doxycycline (ADOXA) 100 mg tablet, Take 1 Tab by mouth two (2) times a day. Indications: ACNE ROSACEA, Disp: 100 Tab, Rfl: 2    PEN NEEDLE, DIABETIC (NOVOFINE 32), 32 g by Does Not Apply route daily.  Indications: 32G x 6 MM,, Disp: , Rfl:       gabapentin 300 mg x 3    Date Last Reviewed:  11/3/2017     EXAMINATION: 10/11/17   Observation/Orthostatic Postural Assessment:          Standing:  · Left scap elevated   · B hips in slight flexion   · Decreased lumbar lordotic curve  · Left lumbar shift   Palpation:         Tender of mid lumbar region   ROM:            Lumbar spine Date:  8/9/17 Date:  10/11/17 Date:     Direction  Parameters Parameters Parameters   Flexion  25% cs 75%    Extension  25% 75%    Rotation  R: 25%  L: 15% R: 50%  L: 25%    Side bending  R: 50%  L: 25% cs  R: 50%  L: 50%    Hip IR Very limited B  Mild-mod limitation     Hip ER Mild limitation B cs Mild limitation cs    Hip flexion  R: 90 degrees  L: 100 degrees cs R: 90 deg  L: 100 deg cs    Cs= comparable sign (symptoms provoked)    Strength:            Lower quadrant    DATE  8/9/17 DATE  10/11/17   Hip flexion R: 4-  L: 4- R: 4 +  L: 4 cs   Hip Abduction  R: 4-  L: 4- R: 4  L: 4   Hip Extension  R: --  L: -- R: --  L: --   Knee Flexion  R: 4  L: 4- R: 4+  L: 4+   Knee Extension  R: 4+  L: 4- R: 4+  L:  4+   Ankle Dorsiflexion  R: 4   L: 4 R: 4+  L: 4+   Ankle Plantarflexion  R: 4  L:4  R: 4+  L: 4+          Special Tests:          None tested   Neurological Screen:        Myotomes: intact         Sensation: intact B LE    Functional Mobility:         Gait/Ambulation:  WBOS, ER B hips, increased lateral sway, reduces significantly with verbal cuing each visit- no carry over          Transfers:  Able to sit to stand 5/5 times without use of UE         Stairs:  Not tested    CLINICAL DECISION MAKING:   Outcome Measure: Tool Used: Modified Oswestry Low Back Pain Questionnaire  Score:  Initial: 38/50  Most Recent: 37/50 (Date: 10/11/17 )   Interpretation of Score: Each section is scored on a 0-5 scale, 5 representing the greatest disability. The scores of each section are added together for a total score of 50. Medical Necessity:   · Patient is expected to demonstrate progress in strength and range of motion to decrease assistance required with ADLs as well as work related activities. .  Reason for Services/Other Comments:  · Patient continues to require modification of therapeutic interventions to increase complexity of exercises. TREATMENT:   (In addition to Assessment/Re-Assessment sessions the following treatments were rendered)  Pre-treatment Symptoms/Complaints:pt states that he enjoyed the aquatic therapy session but was very sore after completed. Traction also caused low back and hip soreness for <24 hours.   Pt reports that he left the KT tape on for over 8-9 days and it was difficult to pull off his back. I reinforced that 5 days is th most he should leave that tape on his skin. Pain: Initial: left sided low back and hip pain     6/10  Post Session:  5/10      THERAPEUTIC EXERCISE: (30 minutes):  Exercises per grid below to improve mobility, strength and coordination. Required moderate visual, verbal and manual cues to promote proper body alignment, promote proper body posture and promote proper body mechanics. Progressed resistance, range and repetitions as indicated.    10/11/17 10/12/17 10/16/17 10/18/17 10/20/17 10/23/17 10/27/17 11/3/17   Activity/Exercise           Education       Importance of drinking water after manual therapy today Traction treatment     LTR    5 x 10 sec B         nustep  X 10 min level 3 X 10 level 4.5 X 10 min level 4 X 10 min level 4  X 12 min level 4.5 X 14min level  4.5 X 13 min level 4.5 X 13 min level 4.5    TAs           Supine add sq w/ TAs           Supine TrA with mini march            Supine hip ABD w/ TAs           SKTC           HS/GS str           Anterior/posterior hip stretching   Figure 4 supine 4 x 30 sec with increasing intensity          bridge  X 10, 5 sec holds      X 5, 10 sec hold  X 2    multif (opp heel and elbow into mat)--supine            Long axis distraction L LE  Manually x 3 min intermittent    In prone manually  In prone manually     Side stepping   2 x 30 ft each direction  2 x 30 feet  2 x 30 ft with OTB  In ladder x 2 laps no resistance  In ladder x 4 laps, no resistance  In ladder x 4 laps, no resist   Diagonals in ladder      In ladder x 2 L, no resistance  X 3 laps    Gait avoiding toe out  X 60 ft    4 x 60 feet focusing on even step length and decreased lateral movement In ladder x 3 laps  In ladder x 3 laps, no resistance In hallway x 60 ft  In ladder x 5 laps, varied step length    Marching with high knees working on hip/core strength and SLS balance    X 60 ft     Side stepping with high knees Sit-stand from mat at elevated height           Hip ER/IR X 5 seated  Hook lying and in hip extended    Manually  Manually     Prone ham curls    X 5 X5                 Manual 0 min: to decrease joint stiffness and soft tissue restriction     · Long distraction of left hip  · Medial rotation left hip grade III in prone   · CPA's thoracic intervertebral and costovertebral joints B grade IV and III 2 bouts of 20 sec each joint  · STM/MFR to lumbar and thoracic region all plane w. And w/out Hawk  tool  · STM/MFR to LS region and L hip w/ \"the stick\" prior to use of tool   · Kinesiotaping for lumbar paraspinal inhibition (2 \"I\" strips)    Modalities: to decrease stress upon lumbar structures    Mechanical lumbar traction x 10 min, 70 lbs/30 lbs, 60 sec/ 30 sec  --To improve intervertebral joint mechanics and decrease stress upon discs and nerve roots. Treatment/Session Assessment: decreased traction time and force due to residual soreness. · Response to Treatment: no soreness after traction today. Performed exercises well with minimal cuing needed. · Recommendations/Intent for next treatment session:  Assess effects of traction treatment and perform again making modifications as appropriate. Continue exercises for stretching and strengthening focusing on T-L spine as well as hip joint.      Future Appointments  Date Time Provider Jonas Denton   11/8/2017 9:00 AM Slime Fulton PT [facility]   11/10/2017 1:15 PM [facility]   11/13/2017 10:00 AM [facility]   11/15/2017 1:45 PM San[facility] Excell Galloway[facility]   11/17/2017 10:00 AM Slime Fulton[facility]   11/22/2017 1:00 PM Sania Excell Galloway[facility]   11/29/2017 1:00 PM Chester Cavanaugh[facility]   1/18/2018 9:20 AM [facility] P[facility]   1/25/2018 2:30 PM India Riley MD P[facility] P[facility]   2/20/2018 8:10 AM P[facility] LAB PIE PIE   2/23/2018 3:30 PM Bailee Slater MD LUCILA Jacob PIE       Total Treatment Duration:  PT Patient Time In/Time Out  Time In: 1000  Time Out: 207 [facility], PT

## 2017-11-08 ENCOUNTER — HOSPITAL ENCOUNTER (OUTPATIENT)
Dept: PHYSICAL THERAPY | Age: 57
Discharge: HOME | End: 2017-11-08
Payer: COMMERCIAL

## 2017-11-08 PROCEDURE — 97012 MECHANICAL TRACTION THERAPY: CPT

## 2017-11-08 PROCEDURE — 97110 THERAPEUTIC EXERCISES: CPT

## 2017-11-08 NOTE — PROGRESS NOTES
Matt Shen  : 1960 [address] at [address], [address] [place] [address], [address]  [phone]   [phone]         OUTPATIENT PHYSICAL THERAPY:Daily Note 2017    ICD-10: Treatment Diagnosis: low back pain (M54.5)  Precautions/Allergies:   Review of patient's allergies indicates no known allergies. Fall Risk Score: 2 (? 5 = High Risk)  MD Orders: evaluate and treat MEDICAL/REFERRING DIAGNOSIS:  Low back pain [M54.5]   DATE OF ONSET: chronic   REFERRING PHYSICIAN: Harlan Park MD  RETURN PHYSICIAN APPOINTMENT: 10/9/17     INITIAL ASSESSMENT:  Mr. Janette Sal has attended 13 scheduled PT visits treating low back pain. Overall, he has made slow progress with mobility and strength, however, pain continues to be consistently moderate with occasional exacerbations of low back and left hip. Some symptoms suggested hip pathology as well. He has been educated on body mechanics with house work activities and as shown independence with his HEP and reports compliance with performing at home. Due to left hip and low back pain during weightbearing activates, the ability to progress patient to more functional activities is limited. He would benefit from continued skilled PT 3 x per week to continued working toward d/c goals and return to work, with the addition of aquatic therapy as one of those 3 sessions each week to work on more functional core and hip strength/educrance until able to perform on land. PROBLEM LIST (Impacting functional limitations):  1. Decreased Strength  2. Decreased ADL/Functional Activities  3. Increased Pain  4. Decreased Flexibility/Joint Mobility  5. Decreased [place] with Home Exercise Program INTERVENTIONS PLANNED:  1. Cold  2. Heat  3. Home Exercise Program (HEP)  4. Manual Therapy  5. Neuromuscular Re-education/Strengthening  6. Range of Motion (ROM)  7. Therapeutic Exercise/Strengthening   8.  Aquatic therapy TREATMENT PLAN:  Effective Dates: 10/9/17 TO 12/9/17. Frequency/Duration: 3 times a week for 6 weeks  GOALS: (Goals have been discussed and agreed upon with patient.)  SHORT-TERM FUNCTIONAL GOALS: Time Frame: 2-3 weeks   1. Pt will be independent with HEP focusing on core stability and promoting good spinal alignment. (met)  2. Pt will report no symptoms of LE for 1-2 weeks demonstrating centralization of symptoms. (met)  3. Pt will report pain level does not exceed 5/10 in a 1-2 week period of time to demonstrate pain management. (ongoing)   4. Pt will report improved sleep with less disturbance from back pain. (not met)  DISCHARGE GOALS: Time Frame: 4-6 weeks   1. Pt will improve Oswestry score by at least 10 points. (ongoing)   2. Pt will demonstrate increased AROM of lumbar spine all planes ~ 25% or greater without report of pain to improve functional mobility. (not met)  3. Pt will be able to sustain regular exercise routine including aerobic exercise 3+ times per week without increased symptoms to encourage healthy lifestyle. (not met)   4. Pt will demonstrate increased B LE strength to at least 4+/5 B to aid with functional mobility. (not met)     Rehabilitation Potential For Stated Goals: Good                The information in this section was collected on 8/7/17 (except where otherwise noted). HISTORY:   History of Present Injury/Illness (Reason for Referral):  Pt reports several years low back pain with a severe episode after lifting heavy objects in 2013 causing left low back L LE pain. Pt states that he has been receiving chiropractic treatments since then for pain management however, pain continued to bother him especially during/after work shifts at [employer]. He states that he is on his feet on concrete all day long and recently back pain interfering with job activities. In 3 months ago, pt reports he called off work due to pain too severe to perform work activities.  Went to MD, imaging showing disc protrusion at L3 and stenosis at L1-2,  L5-S1. Referred to neuro specialist. Epidural injection ordered (not scheduled yet). PT referral with Neuro follow up in 4 weeks. Present symptoms (on day of initial evaluation): constant low back aching, mid to low back. Occasional numbness of B LE (R>L)      · Aggravating factors: standing on hard surfaces 5 min, walking, stairs ambulation, bending   · Relieving factors: recliner, pain medication     · Pain level: 6/10 presently, 9/10 worst, 5/10 best     Past Medical History/Comorbidities: Mr. Meño Herbert  has a past medical history of Allergic rhinitis due to allergen; Arthritis; Colon polyps (8/12/2016); Deficiency, lipoprotein (8/12/2016); Diabetes mellitus type 2, [facility] ([facility] (8/12/2016); Encounter for long-term (current) use of medications (8/12/2016); HLD (hyperlipidemia) (8/12/2016); HTN (hypertension) (8/12/2016); Obesity (8/12/2016); Other abnormal glucose (8/12/2016); Sinusitis, acute maxillary (8/12/2016); and URI (upper respiratory infection) (8/12/2016). Mr. Meño Herbert  has a past surgical history that includes tonsillectomy; adenoidectomy; and other surgical (2005). Social History/Living Environment:     lives with spouse   Prior Level of Function/Work/Activity:  Currently on disability   Dominant Side:         RIGHT  Other Clinical Tests:          MRI lumbar spine: (copied from radiology)   Disc desiccation and mild degenerative changes and facet arthropathy  throughout the lumbar spine. There is a significant central stenosis at L1-2;  the thecal sac measures 6 to 7 mm in AP diameter. At L4-3-4 there is a  broad-based left lateral disc protrusion impinging the enlarged L3 nerve root. Moderate foraminal stenoses at L5-S1. Previous Treatment Approaches:          Chiropractor   Current Medications:       Current Outpatient Prescriptions:     FLUoxetine (PROZAC) 20 mg tablet, Take 1 Tab by mouth daily. , Disp: 90 Tab, Rfl: 3    multivitamin (ONE A DAY) tablet, Take 1 Tab by mouth daily. , Disp: , Rfl:     Insulin Needles, Disposable, (NOVOFINE 32) 32 gauge x 1/4\" ndle, 1 misc daily. For Victoza pen, Disp: 100 Pen Needle, Rfl: 3    traMADol (ULTRAM) 50 mg tablet, Take 1 Tab by mouth every six (6) hours as needed for Pain. Max Daily Amount: 200 mg. Indications: Pain, Disp: 120 Tab, Rfl: 2    metoprolol succinate (TOPROL-XL) 100 mg tablet, Take 1 Tab by mouth daily. , Disp: 90 Tab, Rfl: 3    metroNIDAZOLE (METROGEL) 1 % topical gel, Apply 1 g to affected area daily. Use a thin layer to affected areas after washing, Disp: 60 g, Rfl: 5    lansoprazole (PREVACID) 30 mg capsule, Take 1 Cap by mouth Daily (before breakfast). , Disp: 90 Cap, Rfl: 3    lisinopril-hydroCHLOROthiazide (PRINZIDE, ZESTORETIC) 20-12.5 mg per tablet, Take 1 Tab by mouth daily. , Disp: 90 Tab, Rfl: 3    simvastatin (ZOCOR) 40 mg tablet, Take 1 Tab by mouth daily. , Disp: 90 Tab, Rfl: 3    Liraglutide (VICTOZA) 0.6 mg/0.1 mL (18 mg/3 mL) sub-q pen, 19mg/3ml pen         1.8mg per day  Indications: type 2 diabetes mellitus, Disp: 9 Syringe, Rfl: 3    doxycycline (ADOXA) 100 mg tablet, Take 1 Tab by mouth two (2) times a day. Indications: ACNE ROSACEA, Disp: 100 Tab, Rfl: 2    PEN NEEDLE, DIABETIC (NOVOFINE 32), 32 g by Does Not Apply route daily.  Indications: 32G x 6 MM,, Disp: , Rfl:       gabapentin 300 mg x 3    Date Last Reviewed:  11/8/2017     EXAMINATION: 10/11/17   Observation/Orthostatic Postural Assessment:          Standing:  · Left scap elevated   · B hips in slight flexion   · Decreased lumbar lordotic curve  · Left lumbar shift   Palpation:         Tender of mid lumbar region   ROM:            Lumbar spine Date:  8/9/17 Date:  10/11/17 Date:     Direction  Parameters Parameters Parameters   Flexion  25% cs 75%    Extension  25% 75%    Rotation  R: 25%  L: 15% R: 50%  L: 25%    Side bending  R: 50%  L: 25% cs  R: 50%  L: 50%    Hip IR Very limited B  Mild-mod limitation     Hip ER Mild limitation B cs Mild limitation cs    Hip flexion  R: 90 degrees  L: 100 degrees cs R: 90 deg  L: 100 deg cs    Cs= comparable sign (symptoms provoked)    Strength:            Lower quadrant    DATE  8/9/17 DATE  10/11/17   Hip flexion R: 4-  L: 4- R: 4 +  L: 4 cs   Hip Abduction  R: 4-  L: 4- R: 4  L: 4   Hip Extension  R: --  L: -- R: --  L: --   Knee Flexion  R: 4  L: 4- R: 4+  L: 4+   Knee Extension  R: 4+  L: 4- R: 4+  L:  4+   Ankle Dorsiflexion  R: 4   L: 4 R: 4+  L: 4+   Ankle Plantarflexion  R: 4  L:4  R: 4+  L: 4+          Special Tests:          None tested   Neurological Screen:        Myotomes: intact         Sensation: intact B LE    Functional Mobility:         Gait/Ambulation:  WBOS, ER B hips, increased lateral sway, reduces significantly with verbal cuing each visit- no carry over          Transfers:  Able to sit to stand 5/5 times without use of UE         Stairs:  Not tested    CLINICAL DECISION MAKING:   Outcome Measure: Tool Used: Modified Oswestry Low Back Pain Questionnaire  Score:  Initial: 38/50  Most Recent: 37/50 (Date: 10/11/17 )   Interpretation of Score: Each section is scored on a 0-5 scale, 5 representing the greatest disability. The scores of each section are added together for a total score of 50. Medical Necessity:   · Patient is expected to demonstrate progress in strength and range of motion to decrease assistance required with ADLs as well as work related activities. .  Reason for Services/Other Comments:  · Patient continues to require modification of therapeutic interventions to increase complexity of exercises. TREATMENT:   (In addition to Assessment/Re-Assessment sessions the following treatments were rendered)  Pre-treatment Symptoms/Complaints:pt states soreness 24 hours after last session but then decreased.       Pain: Initial: left sided low back and hip pain     6/10  Post Session:  4/10      THERAPEUTIC EXERCISE: (40 minutes):  Exercises per grid below to improve mobility, strength and coordination. Required moderate visual, verbal and manual cues to promote proper body alignment, promote proper body posture and promote proper body mechanics. Progressed resistance, range and repetitions as indicated.    10/12/17 10/16/17 10/18/17 10/20/17 10/23/17 10/27/17 11/3/17 11/8/17   Activity/Exercise           Education      Importance of drinking water after manual therapy today Traction treatment      LTR 5 x 10 sec B          nustep  X 10 level 4.5 X 10 min level 4 X 10 min level 4  X 12 min level 4.5 X 14min level  4.5 X 13 min level 4.5 X 13 min level 4.5  X 14 min level 4.0    TAs           Supine add sq w/ TAs           Supine TrA with mini march            Supine hip ABD w/ TAs           SKTC           HS/GS str           Anterior/posterior hip stretching  Figure 4 supine 4 x 30 sec with increasing intensity           bridge X 10, 5 sec holds      X 5, 10 sec hold  X 2     multif (opp heel and elbow into mat)--supine            Long axis distraction L LE Manually x 3 min intermittent    In prone manually  In prone manually      Side stepping  2 x 30 ft each direction  2 x 30 feet  2 x 30 ft with OTB  In ladder x 2 laps no resistance  In ladder x 4 laps, no resistance  In ladder x 4 laps, no resist In ladder x 4 laps    Diagonals in ladder     In ladder x 2 L, no resistance  X 3 laps  X 3 laps in ladder    Gait avoiding toe out X 60 ft    4 x 60 feet focusing on even step length and decreased lateral movement In ladder x 3 laps  In ladder x 3 laps, no resistance In hallway x 60 ft  In ladder x 5 laps, varied step length  In ladder x 5 laps    Marching with high knees working on hip/core strength and SLS balance   X 60 ft     Side stepping with high knees     Sit-stand from mat at elevated height           Hip ER/IR Hook lying and in hip extended    Manually  Manually      Prone ham curls   X 5 X5                    Manual 0 min: to decrease joint stiffness and soft tissue restriction     · Long distraction of left hip  · Medial rotation left hip grade III in prone   · CPA's thoracic intervertebral and costovertebral joints B grade IV and III 2 bouts of 20 sec each joint  · STM/MFR to lumbar and thoracic region all plane w. And w/out Hawk  tool  · STM/MFR to LS region and L hip w/ \"the stick\" prior to use of tool   · Kinesiotaping for lumbar paraspinal inhibition (2 \"I\" strips)    Modalities: to decrease stress upon lumbar structures    Mechanical lumbar traction x 10 min, 70 lbs/30 lbs, 60 sec/ 30 sec  --To improve intervertebral joint mechanics and decrease stress upon discs and nerve roots. Treatment/Session Assessment: parameters same on traction due to less soreness after last treatment. · Response to Treatment: no soreness after traction today. Performed exercises well with minimal cuing needed. · Recommendations/Intent for next treatment session:  Assess effects of traction treatment and perform again making modifications as appropriate. Continue exercises for stretching and strengthening focusing on T-L spine as well as hip joint. Functional activities as able.      Future Appointments  Date Time Provider Jonas Denton   11/13/2017 10:00 AM Gabriel Slater, PT [facility] [place] [facility]   11/15/2017 1:45 PM Sanialondon Landers Sleight, [facility]   11/17/2017 10:00 AM Gabriel Slater, PT [facility]   11/22/2017 1:00 PM Sania Mylinda Sleight, PT [facility]   11/29/2017 1:00 PM Sanialondon Walteright, PT [facility]   1/18/2018 9:20 AM P[facility] LAB PIE PIE   1/25/2018 2:30 PM MD LUCILA Bruner   2/20/2018 8:10 AM PIE LAB PIE PIE   2/23/2018 3:30 PM MD LUCILA Bruner       Total Treatment Duration:  PT Patient Time In/Time Out  Time In: 0900  Time Out: [id number], PT

## 2017-11-10 ENCOUNTER — APPOINTMENT (OUTPATIENT)
Dept: PHYSICAL THERAPY | Age: 57
End: 2017-11-10
Payer: COMMERCIAL

## 2017-11-13 ENCOUNTER — HOSPITAL ENCOUNTER (OUTPATIENT)
Dept: PHYSICAL THERAPY | Age: 57
Discharge: HOME | End: 2017-11-13
Payer: COMMERCIAL

## 2017-11-13 PROCEDURE — 97012 MECHANICAL TRACTION THERAPY: CPT

## 2017-11-13 PROCEDURE — 97110 THERAPEUTIC EXERCISES: CPT

## 2017-11-13 NOTE — PROGRESS NOTES
Matthew Beatty  : 1960 [address] at [place] [address] [address] [place] [address], [facility], [address] [place] [address]  [phone]   [phone]         OUTPATIENT PHYSICAL THERAPY:Daily Note 2017    ICD-10: Treatment Diagnosis: low back pain (M54.5)  Precautions/Allergies:   Review of patient's allergies indicates no known allergies. Fall Risk Score: 2 (? 5 = High Risk)  MD Orders: evaluate and treat MEDICAL/REFERRING DIAGNOSIS:  Low back pain [M54.5]   DATE OF ONSET: chronic   REFERRING PHYSICIAN: Rahul Mackenzie MD  RETURN PHYSICIAN APPOINTMENT: 10/9/17     INITIAL ASSESSMENT:  Mr. Heidy Padilla has attended 13 scheduled PT visits treating low back pain. Overall, he has made slow progress with mobility and strength, however, pain continues to be consistently moderate with occasional exacerbations of low back and left hip. Some symptoms suggested hip pathology as well. He has been educated on body mechanics with house work activities and as shown independence with his HEP and reports compliance with performing at home. Due to left hip and low back pain during weightbearing activates, the ability to progress patient to more functional activities is limited. He would benefit from continued skilled PT 3 x per week to continued working toward d/c goals and return to work, with the addition of aquatic therapy as one of those 3 sessions each week to work on more functional core and hip strength/educrance until able to perform on land. PROBLEM LIST (Impacting functional limitations):  1. Decreased Strength  2. Decreased ADL/Functional Activities  3. Increased Pain  4. Decreased Flexibility/Joint Mobility  5. Decreased [place] with Home Exercise Program INTERVENTIONS PLANNED:  1. Cold  2. Heat  3. Home Exercise Program (HEP)  4. Manual Therapy  5. Neuromuscular Re-education/Strengthening  6. Range of Motion (ROM)  7. Therapeutic Exercise/Strengthening   8.  Aquatic therapy TREATMENT PLAN:  Effective Dates: 10/9/17 TO 12/9/17. Frequency/Duration: 3 times a week for 6 weeks  GOALS: (Goals have been discussed and agreed upon with patient.)  SHORT-TERM FUNCTIONAL GOALS: Time Frame: 2-3 weeks   1. Pt will be independent with HEP focusing on core stability and promoting good spinal alignment. (met)  2. Pt will report no symptoms of LE for 1-2 weeks demonstrating centralization of symptoms. (met)  3. Pt will report pain level does not exceed 5/10 in a 1-2 week period of time to demonstrate pain management. (ongoing)   4. Pt will report improved sleep with less disturbance from back pain. (not met)  DISCHARGE GOALS: Time Frame: 4-6 weeks   1. Pt will improve Oswestry score by at least 10 points. (ongoing)   2. Pt will demonstrate increased AROM of lumbar spine all planes ~ 25% or greater without report of pain to improve functional mobility. (not met)  3. Pt will be able to sustain regular exercise routine including aerobic exercise 3+ times per week without increased symptoms to encourage healthy lifestyle. (not met)   4. Pt will demonstrate increased B LE strength to at least 4+/5 B to aid with functional mobility. (not met)     Rehabilitation Potential For Stated Goals: Good                The information in this section was collected on 8/7/17 (except where otherwise noted). HISTORY:   History of Present Injury/Illness (Reason for Referral):  Pt reports several years low back pain with a severe episode after lifting heavy objects in 2013 causing left low back L LE pain. Pt states that he has been receiving chiropractic treatments since then for pain management however, pain continued to bother him especially during/after work shifts at [employer]. He states that he is on his feet on concrete all day long and recently back pain interfering with job activities. In 3 months ago, pt reports he called off work due to pain too severe to perform work activities.  Went to MD, imaging showing disc protrusion at L3 and stenosis at L1-2,  L5-S1. Referred to neuro specialist. Epidural injection ordered (not scheduled yet). PT referral with Neuro follow up in 4 weeks. Present symptoms (on day of initial evaluation): constant low back aching, mid to low back. Occasional numbness of B LE (R>L)      · Aggravating factors: standing on hard surfaces 5 min, walking, stairs ambulation, bending   · Relieving factors: recliner, pain medication     · Pain level: 6/10 presently, 9/10 worst, 5/10 best     Past Medical History/Comorbidities: Mr. Abdi Strickland  has a past medical history of Allergic rhinitis due to allergen; Arthritis; Colon polyps (8/12/2016); Deficiency, lipoprotein (8/12/2016); [facility] (8/12/2016); Encounter for long-term (current) use of medications (8/12/2016); HLD (hyperlipidemia) (8/12/2016); HTN (hypertension) (8/12/2016); Obesity (8/12/2016); Other abnormal glucose (8/12/2016); Sinusitis, acute maxillary (8/12/2016); and URI (upper respiratory infection) (8/12/2016). Mr. Abdi [place]  has a past surgical history that includes tonsillectomy; adenoidectomy; and other surgical (2005). Social History/Living Environment:     lives with spouse   Prior Level of Function/Work/Activity:  Currently on disability   Dominant Side:         RIGHT  Other Clinical Tests:          MRI lumbar spine: (copied from radiology)   Disc desiccation and mild degenerative changes and facet arthropathy  throughout the lumbar spine. There is a significant central stenosis at L1-2;  the thecal sac measures 6 to 7 mm in AP diameter. At L4-3-4 there is a  broad-based left lateral disc protrusion impinging the enlarged L3 nerve root. Moderate foraminal stenoses at L5-S1. Previous Treatment Approaches:          Chiropractor   Current Medications:       Current Outpatient Prescriptions:     FLUoxetine (PROZAC) 20 mg tablet, Take 1 Tab by mouth daily. , Disp: 90 Tab, Rfl: 3    multivitamin (ONE A DAY) tablet, Take 1 Tab by mouth daily. , Disp: , Rfl:     Insulin Needles, Disposable, (NOVOFINE 32) 32 gauge x 1/4\" ndle, 1 misc daily. For Victoza pen, Disp: 100 Pen Needle, Rfl: 3    traMADol (ULTRAM) 50 mg tablet, Take 1 Tab by mouth every six (6) hours as needed for Pain. Max Daily Amount: 200 mg. Indications: Pain, Disp: 120 Tab, Rfl: 2    metoprolol succinate (TOPROL-XL) 100 mg tablet, Take 1 Tab by mouth daily. , Disp: 90 Tab, Rfl: 3    metroNIDAZOLE (METROGEL) 1 % topical gel, Apply 1 g to affected area daily. Use a thin layer to affected areas after washing, Disp: 60 g, Rfl: 5    lansoprazole (PREVACID) 30 mg capsule, Take 1 Cap by mouth Daily (before breakfast). , Disp: 90 Cap, Rfl: 3    lisinopril-hydroCHLOROthiazide (PRINZIDE, ZESTORETIC) 20-12.5 mg per tablet, Take 1 Tab by mouth daily. , Disp: 90 Tab, Rfl: 3    simvastatin (ZOCOR) 40 mg tablet, Take 1 Tab by mouth daily. , Disp: 90 Tab, Rfl: 3    Liraglutide (VICTOZA) 0.6 mg/0.1 mL (18 mg/3 mL) sub-q pen, 19mg/3ml pen         1.8mg per day  Indications: type 2 diabetes mellitus, Disp: 9 Syringe, Rfl: 3    doxycycline (ADOXA) 100 mg tablet, Take 1 Tab by mouth two (2) times a day. Indications: ACNE ROSACEA, Disp: 100 Tab, Rfl: 2    PEN NEEDLE, DIABETIC (NOVOFINE 32), 32 g by Does Not Apply route daily.  Indications: 32G x 6 MM,, Disp: , Rfl:       gabapentin 300 mg x 3    Date Last Reviewed:  11/13/2017     EXAMINATION: 10/11/17   Observation/Orthostatic Postural Assessment:          Standing:  · Left scap elevated   · B hips in slight flexion   · Decreased lumbar lordotic curve  · Left lumbar shift   Palpation:         Tender of mid lumbar region   ROM:            Lumbar spine Date:  8/9/17 Date:  10/11/17 Date:     Direction  Parameters Parameters Parameters   Flexion  25% cs 75%    Extension  25% 75%    Rotation  R: 25%  L: 15% R: 50%  L: 25%    Side bending  R: 50%  L: 25% cs  R: 50%  L: 50%    Hip IR Very limited B  Mild-mod limitation     Hip ER Mild limitation B cs Mild limitation cs    Hip flexion  R: 90 degrees  L: 100 degrees cs R: 90 deg  L: 100 deg cs    Cs= comparable sign (symptoms provoked)    Strength:            Lower quadrant    DATE  8/9/17 DATE  10/11/17   Hip flexion R: 4-  L: 4- R: 4 +  L: 4 cs   Hip Abduction  R: 4-  L: 4- R: 4  L: 4   Hip Extension  R: --  L: -- R: --  L: --   Knee Flexion  R: 4  L: 4- R: 4+  L: 4+   Knee Extension  R: 4+  L: 4- R: 4+  L:  4+   Ankle Dorsiflexion  R: 4   L: 4 R: 4+  L: 4+   Ankle Plantarflexion  R: 4  L:4  R: 4+  L: 4+          Special Tests:          None tested   Neurological Screen:        Myotomes: intact         Sensation: intact B LE    Functional Mobility:         Gait/Ambulation:  WBOS, ER B hips, increased lateral sway, reduces significantly with verbal cuing each visit- no carry over          Transfers:  Able to sit to stand 5/5 times without use of UE         Stairs:  Not tested    CLINICAL DECISION MAKING:   Outcome Measure: Tool Used: Modified Oswestry Low Back Pain Questionnaire  Score:  Initial: 38/50  Most Recent: 37/50 (Date: 10/11/17 )   Interpretation of Score: Each section is scored on a 0-5 scale, 5 representing the greatest disability. The scores of each section are added together for a total score of 50. Medical Necessity:   · Patient is expected to demonstrate progress in strength and range of motion to decrease assistance required with ADLs as well as work related activities. .  Reason for Services/Other Comments:  · Patient continues to require modification of therapeutic interventions to increase complexity of exercises. TREATMENT:   (In addition to Assessment/Re-Assessment sessions the following treatments were rendered)  Pre-treatment Symptoms/Complaints: pt states he had an Injection on Friday and states that it was pretty painful this time compared to last. He also states that he was sore for about 1 day after session.   Pt to attend aquatic session on wednesday    Pain: Initial:low back only      8/10  Post Session:  6/10      THERAPEUTIC EXERCISE: (40 minutes):  Exercises per grid below to improve mobility, strength and coordination. Required moderate visual, verbal and manual cues to promote proper body alignment, promote proper body posture and promote proper body mechanics. Progressed resistance, range and repetitions as indicated.    10/16/17 10/18/17 10/20/17 10/23/17 10/27/17 11/3/17 11/8/17 11/13/17   Activity/Exercise           Education     Importance of drinking water after manual therapy today Traction treatment       LTR           nustep  X 10 min level 4 X 10 min level 4  X 12 min level 4.5 X 14min level  4.5 X 13 min level 4.5 X 13 min level 4.5  X 14 min level 4.0  14 min level 4.0     Supine add sq w/ TAs           Supine TrA with mini march            Supine hip ABD w/ TAs           SKTC           HS/GS str           Anterior/posterior hip stretching            bridge     X 5, 10 sec hold  X 2   X 3    multif (opp heel and elbow into mat)--supine            Long axis distraction L LE   In prone manually  In prone manually       Side stepping  2 x 30 feet  2 x 30 ft with OTB  In ladder x 2 laps no resistance  In ladder x 4 laps, no resistance  In ladder x 4 laps, no resist In ladder x 4 laps  X 4 laps    Diagonals in ladder    In ladder x 2 L, no resistance  X 3 laps  X 3 laps in ladder  X 4 laps in ladder   Gait avoiding toe out   4 x 60 feet focusing on even step length and decreased lateral movement In ladder x 3 laps  In ladder x 3 laps, no resistance In hallway x 60 ft  In ladder x 5 laps, varied step length  In ladder x 5 laps  In ladder x 5 laps    Marching with high knees working on hip/core strength and SLS balance  X 60 ft     Side stepping with high knees   Side stepping with high knees  X 4 laps   Sit-stand from mat at elevated height           Hip ER/IR   Manually  Manually       Prone ham curls  X 5 X5                     Manual 0 min: to decrease joint stiffness and soft tissue restriction     · Long distraction of left hip  · Medial rotation left hip grade III in prone   · CPA's thoracic intervertebral and costovertebral joints B grade IV and III 2 bouts of 20 sec each joint  · STM/MFR to lumbar and thoracic region all plane w. And w/out Hawk  tool  · STM/MFR to LS region and L hip w/ \"the stick\" prior to use of tool   · Kinesiotaping for lumbar paraspinal inhibition (2 \"I\" strips)    Modalities: to decrease stress upon lumbar structures    Mechanical lumbar traction x 10 min, 73 lbs/30 lbs, 60 sec/ 30 sec  --To improve intervertebral joint mechanics and decrease stress upon discs and nerve roots. Treatment/Session Assessment: parameters modified on traction due to decreased soreness after last session. · Response to Treatment: no soreness after traction today. Performed exercises well with minimal cuing needed. · Recommendations/Intent for next treatment session:  Assess effects of traction treatment and perform again making modifications as appropriate. Continue exercises for stretching and strengthening focusing on T-L spine as well as hip joint. Functional activities as able.      Future Appointments  Date Time Provider Jonas [place]   11/15/2017 8:45 AM Sania Tolliver, PT [facility]   11/17/2017 10:00 AM Curtis Zheng PT [facility]   11/22/2017 1:00 PM Sania Tolliver, PT [facility]   11/29/2017 1:00 PM Sania Tolliver, PT [facility]   1/18/2018 9:20 AM [facility]   1/25/2018 2:30 PM MD LUCILA Gamez   2/20/2018 8:10 AM [facility] P[facility]   2/23/2018 3:30 PM MD LUCILA Gamez       Total Treatment Duration:  PT Patient Time In/Time Out  Time In: 1000  Time Out: 207 [facility], PT

## 2017-11-15 ENCOUNTER — APPOINTMENT (OUTPATIENT)
Dept: PHYSICAL THERAPY | Age: 57
End: 2017-11-15
Payer: COMMERCIAL

## 2017-11-15 ENCOUNTER — HOSPITAL ENCOUNTER (OUTPATIENT)
Dept: PHYSICAL THERAPY | Age: 57
Discharge: HOME | End: 2017-11-15
Payer: COMMERCIAL

## 2017-11-15 PROCEDURE — 97113 AQUATIC THERAPY/EXERCISES: CPT

## 2017-11-17 ENCOUNTER — HOSPITAL ENCOUNTER (OUTPATIENT)
Dept: PHYSICAL THERAPY | Age: 57
Discharge: HOME | End: 2017-11-17
Payer: COMMERCIAL

## 2017-11-17 PROCEDURE — 97012 MECHANICAL TRACTION THERAPY: CPT

## 2017-11-17 PROCEDURE — 97110 THERAPEUTIC EXERCISES: CPT

## 2017-11-17 NOTE — PROGRESS NOTES
Karen Haley  : 1960 [address] [facility] at [address] [place] [address] [address] [place] [address], [address]  [phone]   [phone]         OUTPATIENT PHYSICAL THERAPY:Daily Note 2017    ICD-10: Treatment Diagnosis: low back pain (M54.5)  Precautions/Allergies:   Review of patient's allergies indicates no known allergies. Fall Risk Score: 2 (? 5 = High Risk)  MD Orders: evaluate and treat MEDICAL/REFERRING DIAGNOSIS:  Low back pain [M54.5]   DATE OF ONSET: chronic   REFERRING PHYSICIAN: Laith Pinzon MD  RETURN PHYSICIAN APPOINTMENT: 10/9/17     INITIAL ASSESSMENT:  Mr. Abdi Strickland has attended 13 scheduled PT visits treating low back pain. Overall, he has made slow progress with mobility and strength, however, pain continues to be consistently moderate with occasional exacerbations of low back and left hip. Some symptoms suggested hip pathology as well. He has been educated on body mechanics with house work activities and as shown independence with his HEP and reports compliance with performing at home. Due to left hip and low back pain during weightbearing activates, the ability to progress patient to more functional activities is limited. He would benefit from continued skilled PT 3 x per week to continued working toward d/c goals and return to work, with the addition of aquatic therapy as one of those 3 sessions each week to work on more functional core and hip strength/educrance until able to perform on land. PROBLEM LIST (Impacting functional limitations):  1. Decreased Strength  2. Decreased ADL/Functional Activities  3. Increased Pain  4. Decreased Flexibility/Joint Mobility  5. Decreased [place] with Home Exercise Program INTERVENTIONS PLANNED:  1. Cold  2. Heat  3. Home Exercise Program (HEP)  4. Manual Therapy  5. Neuromuscular Re-education/Strengthening  6. Range of Motion (ROM)  7. Therapeutic Exercise/Strengthening   8.  Aquatic therapy TREATMENT PLAN:  Effective Dates: 10/9/17 TO 12/9/17. Frequency/Duration: 3 times a week for 6 weeks  GOALS: (Goals have been discussed and agreed upon with patient.)  SHORT-TERM FUNCTIONAL GOALS: Time Frame: 2-3 weeks   1. Pt will be independent with HEP focusing on core stability and promoting good spinal alignment. (met)  2. Pt will report no symptoms of LE for 1-2 weeks demonstrating centralization of symptoms. (met)  3. Pt will report pain level does not exceed 5/10 in a 1-2 week period of time to demonstrate pain management. (ongoing)   4. Pt will report improved sleep with less disturbance from back pain. (not met)  DISCHARGE GOALS: Time Frame: 4-6 weeks   1. Pt will improve Oswestry score by at least 10 points. (ongoing)   2. Pt will demonstrate increased AROM of lumbar spine all planes ~ 25% or greater without report of pain to improve functional mobility. (not met)  3. Pt will be able to sustain regular exercise routine including aerobic exercise 3+ times per week without increased symptoms to encourage healthy lifestyle. (not met)   4. Pt will demonstrate increased B LE strength to at least 4+/5 B to aid with functional mobility. (not met)     Rehabilitation Potential For Stated Goals: Good                The information in this section was collected on 8/7/17 (except where otherwise noted). HISTORY:   History of Present Injury/Illness (Reason for Referral):  Pt reports several years low back pain with a severe episode after lifting heavy objects in 2013 causing left low back L LE pain. Pt states that he has been receiving chiropractic treatments since then for pain management however, pain continued to bother him especially during/after work shifts at [employer]. He states that he is on his feet on concrete all day long and recently back pain interfering with job activities. In 3 months ago, pt reports he called off work due to pain too severe to perform work activities.  Went to MD, imaging showing disc protrusion at L3 and stenosis at L1-2,  L5-S1. Referred to neuro specialist. Epidural injection ordered (not scheduled yet). PT referral with Neuro follow up in 4 weeks. Present symptoms (on day of initial evaluation): constant low back aching, mid to low back. Occasional numbness of B LE (R>L)      · Aggravating factors: standing on hard surfaces 5 min, walking, stairs ambulation, bending   · Relieving factors: recliner, pain medication     · Pain level: 6/10 presently, 9/10 worst, 5/10 best     Past Medical History/Comorbidities: Mr. David Dozier  has a past medical history of Allergic rhinitis due to allergen; Arthritis; Colon polyps (8/12/2016); Deficiency, lipoprotein (8/12/2016); [facility] (8/12/2016); Encounter for long-term (current) use of medications (8/12/2016); HLD (hyperlipidemia) (8/12/2016); HTN (hypertension) (8/12/2016); Obesity (8/12/2016); Other abnormal glucose (8/12/2016); Sinusitis, acute maxillary (8/12/2016); and URI (upper respiratory infection) (8/12/2016). Mr. David Dozier  has a past surgical history that includes tonsillectomy; adenoidectomy; and other surgical (2005). Social History/Living Environment:     lives with spouse   Prior Level of Function/Work/Activity:  Currently on disability   Dominant Side:         RIGHT  Other Clinical Tests:          MRI lumbar spine: (copied from radiology)   Disc desiccation and mild degenerative changes and facet arthropathy  throughout the lumbar spine. There is a significant central stenosis at L1-2;  the thecal sac measures 6 to 7 mm in AP diameter. At L4-3-4 there is a  broad-based left lateral disc protrusion impinging the enlarged L3 nerve root. Moderate foraminal stenoses at L5-S1. Previous Treatment Approaches:          Chiropractor   Current Medications:       Current Outpatient Prescriptions:     FLUoxetine (PROZAC) 20 mg tablet, Take 1 Tab by mouth daily. , Disp: 90 Tab, Rfl: 3    multivitamin (ONE A DAY) tablet, Take 1 Tab by mouth daily. , Disp: , Rfl:     Insulin Needles, Disposable, (NOVOFINE 32) 32 gauge x 1/4\" ndle, 1 misc daily. For Victoza pen, Disp: 100 Pen Needle, Rfl: 3    traMADol (ULTRAM) 50 mg tablet, Take 1 Tab by mouth every six (6) hours as needed for Pain. Max Daily Amount: 200 mg. Indications: Pain, Disp: 120 Tab, Rfl: 2    metoprolol succinate (TOPROL-XL) 100 mg tablet, Take 1 Tab by mouth daily. , Disp: 90 Tab, Rfl: 3    metroNIDAZOLE (METROGEL) 1 % topical gel, Apply 1 g to affected area daily. Use a thin layer to affected areas after washing, Disp: 60 g, Rfl: 5    lansoprazole (PREVACID) 30 mg capsule, Take 1 Cap by mouth Daily (before breakfast). , Disp: 90 Cap, Rfl: 3    lisinopril-hydroCHLOROthiazide (PRINZIDE, ZESTORETIC) 20-12.5 mg per tablet, Take 1 Tab by mouth daily. , Disp: 90 Tab, Rfl: 3    simvastatin (ZOCOR) 40 mg tablet, Take 1 Tab by mouth daily. , Disp: 90 Tab, Rfl: 3    Liraglutide (VICTOZA) 0.6 mg/0.1 mL (18 mg/3 mL) sub-q pen, 19mg/3ml pen         1.8mg per day  Indications: type 2 diabetes mellitus, Disp: 9 Syringe, Rfl: 3    doxycycline (ADOXA) 100 mg tablet, Take 1 Tab by mouth two (2) times a day. Indications: ACNE ROSACEA, Disp: 100 Tab, Rfl: 2    PEN NEEDLE, DIABETIC (NOVOFINE 32), 32 g by Does Not Apply route daily.  Indications: 32G x 6 MM,, Disp: , Rfl:       gabapentin 300 mg x 3    Date Last Reviewed:  11/17/2017     EXAMINATION: 10/11/17   Observation/Orthostatic Postural Assessment:          Standing:  · Left scap elevated   · B hips in slight flexion   · Decreased lumbar lordotic curve  · Left lumbar shift   Palpation:         Tender of mid lumbar region   ROM:            Lumbar spine Date:  8/9/17 Date:  10/11/17 Date:     Direction  Parameters Parameters Parameters   Flexion  25% cs 75%    Extension  25% 75%    Rotation  R: 25%  L: 15% R: 50%  L: 25%    Side bending  R: 50%  L: 25% cs  R: 50%  L: 50%    Hip IR Very limited B  Mild-mod limitation     Hip ER Mild limitation B cs Mild limitation cs    Hip flexion  R: 90 degrees  L: 100 degrees cs R: 90 deg  L: 100 deg cs    Cs= comparable sign (symptoms provoked)    Strength:            Lower quadrant    DATE  8/9/17 DATE  10/11/17   Hip flexion R: 4-  L: 4- R: 4 +  L: 4 cs   Hip Abduction  R: 4-  L: 4- R: 4  L: 4   Hip Extension  R: --  L: -- R: --  L: --   Knee Flexion  R: 4  L: 4- R: 4+  L: 4+   Knee Extension  R: 4+  L: 4- R: 4+  L:  4+   Ankle Dorsiflexion  R: 4   L: 4 R: 4+  L: 4+   Ankle Plantarflexion  R: 4  L:4  R: 4+  L: 4+          Special Tests:          None tested   Neurological Screen:        Myotomes: intact         Sensation: intact B LE    Functional Mobility:         Gait/Ambulation:  WBOS, ER B hips, increased lateral sway, reduces significantly with verbal cuing each visit- no carry over          Transfers:  Able to sit to stand 5/5 times without use of UE         Stairs:  Not tested    CLINICAL DECISION MAKING:   Outcome Measure: Tool Used: Modified Oswestry Low Back Pain Questionnaire  Score:  Initial: 38/50  Most Recent: 37/50 (Date: 10/11/17 )   Interpretation of Score: Each section is scored on a 0-5 scale, 5 representing the greatest disability. The scores of each section are added together for a total score of 50. Medical Necessity:   · Patient is expected to demonstrate progress in strength and range of motion to decrease assistance required with ADLs as well as work related activities. .  Reason for Services/Other Comments:  · Patient continues to require modification of therapeutic interventions to increase complexity of exercises. TREATMENT:   (In addition to Assessment/Re-Assessment sessions the following treatments were rendered)  Pre-treatment Symptoms/Complaints: pt reports he enjoyed the aquatic session last visit was very fatigued after exercises.  Pt states he continues to have increased pain in AM.     Pain: Initial:low back and hip    8/10  Post Session:  8/10 low back only     THERAPEUTIC EXERCISE: (40 minutes):  Exercises per grid below to improve mobility, strength and coordination. Required moderate visual, verbal and manual cues to promote proper body alignment, promote proper body posture and promote proper body mechanics. Progressed resistance, range and repetitions as indicated.    10/16/17 10/18/17 10/20/17 10/23/17 10/27/17 11/3/17 11/8/17 11/13/17 11/17/17   Activity/Exercise            Education     Importance of drinking water after manual therapy today Traction treatment        LTR            nustep  X 10 min level 4 X 10 min level 4  X 12 min level 4.5 X 14min level  4.5 X 13 min level 4.5 X 13 min level 4.5  X 14 min level 4.0  14 min level 4.0   11 min level 4   Supine add sq w/ TAs            Supine TrA with mini march             Supine hip ABD w/ TAs            SKTC            HS/GS str            Anterior/posterior hip stretching             bridge     X 5, 10 sec hold  X 2   X 3     multif (opp heel and elbow into mat)--supine             Long axis distraction L LE   In prone manually  In prone manually        Side stepping  2 x 30 feet  2 x 30 ft with OTB  In ladder x 2 laps no resistance  In ladder x 4 laps, no resistance  In ladder x 4 laps, no resist In ladder x 4 laps  X 4 laps  X 5 laps over cones in agility ladder    Diagonals in ladder    In ladder x 2 L, no resistance  X 3 laps  X 3 laps in ladder  X 4 laps in ladder X 5 laps   Gait avoiding toe out   4 x 60 feet focusing on even step length and decreased lateral movement In ladder x 3 laps  In ladder x 3 laps, no resistance In hallway x 60 ft  In ladder x 5 laps, varied step length  In ladder x 5 laps  In ladder x 5 laps     Marching with high knees working on hip/core strength and SLS balance  X 60 ft     Side stepping with high knees   Side stepping with high knees  X 4 laps Over cones x 5 laps in ladder    Sit-stand from mat at elevated height            Hip ER/IR   Manually  Manually Prone ham curls  X 5 X5                       Manual 0 min: to decrease joint stiffness and soft tissue restriction     · Long distraction of left hip  · Medial rotation left hip grade III in prone   · CPA's thoracic intervertebral and costovertebral joints B grade IV and III 2 bouts of 20 sec each joint  · STM/MFR to lumbar and thoracic region all plane w. And w/out Hawk  tool  · STM/MFR to LS region and L hip w/ \"the stick\" prior to use of tool   · Kinesiotaping for lumbar paraspinal inhibition (2 \"I\" strips)    Modalities: to decrease stress upon lumbar structures    Mechanical lumbar traction x 15 min, 73 lbs/30 lbs, 60 sec/ 30 sec  --To improve intervertebral joint mechanics and decrease stress upon discs and nerve roots. Treatment/Session Assessment:     · Response to Treatment:  Performed exercises well, able to balance during sls activities on left LE better overall. · Recommendations/Intent for next treatment session:  Continues to assess effects of traction treatment and perform again making modifications as appropriate. Continue exercises for stretching and strengthening focusing on T-L spine as well as hip joint. Functional activities as able.      Future Appointments  Date Time Provider Jonas Denton   11/22/2017 1:00 PM Deirdre SAINT-DIÉ, JANELEL HAMMER [facility]   11/29/2017 1:00 PM Deirdre SAINT-DIÉ, PT SFORPTWD [facility]   1/18/2018 9:20 AM PIE LAB PIE PIE   1/25/2018 2:30 PM MD LUCILA Perez   2/20/2018 8:10 AM PIE LAB PIE PIE   2/23/2018 3:30 PM MD LUCILA Perez       Total Treatment Duration:       Mario Szymanski, PT

## 2017-11-22 ENCOUNTER — HOSPITAL ENCOUNTER (OUTPATIENT)
Dept: PHYSICAL THERAPY | Age: 57
Discharge: HOME | End: 2017-11-22
Payer: COMMERCIAL

## 2017-11-22 PROCEDURE — 97113 AQUATIC THERAPY/EXERCISES: CPT

## 2017-11-22 NOTE — PROGRESS NOTES
Radha Serra  : 1960 [id number] [facility],[address] at [address], [address] [place] [address], [address]  [phone]   [phone]         OUTPATIENT PHYSICAL THERAPY:Daily Note 2017    ICD-10: Treatment Diagnosis: low back pain (M54.5)  Precautions/Allergies:   Review of patient's allergies indicates no known allergies. Fall Risk Score: 2 (? 5 = High Risk)  MD Orders: evaluate and treat MEDICAL/REFERRING DIAGNOSIS:  Low back pain [M54.5]   DATE OF ONSET: chronic   REFERRING PHYSICIAN: Gloria Israel MD  RETURN PHYSICIAN APPOINTMENT: 10/9/17     INITIAL ASSESSMENT:  Mr. Nella Clemons has attended 13 scheduled PT visits treating low back pain. Overall, he has made slow progress with mobility and strength, however, pain continues to be consistently moderate with occasional exacerbations of low back and left hip. Some symptoms suggested hip pathology as well. He has been educated on body mechanics with house work activities and as shown independence with his HEP and reports compliance with performing at home. Due to left hip and low back pain during weightbearing activates, the ability to progress patient to more functional activities is limited. He would benefit from continued skilled PT 3 x per week to continued working toward d/c goals and return to work, with the addition of aquatic therapy as one of those 3 sessions each week to work on more functional core and hip strength/educrance until able to perform on land. PROBLEM LIST (Impacting functional limitations):  1. Decreased Strength  2. Decreased ADL/Functional Activities  3. Increased Pain  4. Decreased Flexibility/Joint Mobility  5. Decreased [place] with Home Exercise Program INTERVENTIONS PLANNED:  1. Cold  2. Heat  3. Home Exercise Program (HEP)  4. Manual Therapy  5. Neuromuscular Re-education/Strengthening  6. Range of Motion (ROM)  7. Therapeutic Exercise/Strengthening   8.  Aquatic therapy TREATMENT PLAN:  Effective Dates: 10/9/17 TO 12/9/17. Frequency/Duration: 3 times a week for 6 weeks  GOALS: (Goals have been discussed and agreed upon with patient.)  SHORT-TERM FUNCTIONAL GOALS: Time Frame: 2-3 weeks   1. Pt will be independent with HEP focusing on core stability and promoting good spinal alignment. (met)  2. Pt will report no symptoms of LE for 1-2 weeks demonstrating centralization of symptoms. (met)  3. Pt will report pain level does not exceed 5/10 in a 1-2 week period of time to demonstrate pain management. (ongoing)   4. Pt will report improved sleep with less disturbance from back pain. (not met)  DISCHARGE GOALS: Time Frame: 4-6 weeks   1. Pt will improve Oswestry score by at least 10 points. (ongoing)   2. Pt will demonstrate increased AROM of lumbar spine all planes ~ 25% or greater without report of pain to improve functional mobility. (not met)  3. Pt will be able to sustain regular exercise routine including aerobic exercise 3+ times per week without increased symptoms to encourage healthy lifestyle. (not met)   4. Pt will demonstrate increased B LE strength to at least 4+/5 B to aid with functional mobility. (not met)     Rehabilitation Potential For Stated Goals: Good                The information in this section was collected on 8/7/17 (except where otherwise noted). HISTORY:   History of Present Injury/Illness (Reason for Referral):  Pt reports several years low back pain with a severe episode after lifting heavy objects in 2013 causing left low back L LE pain. Pt states that he has been receiving chiropractic treatments since then for pain management however, pain continued to bother him especially during/after work shifts at [employer]. He states that he is on his feet on concrete all day long and recently back pain interfering with job activities. In 3 months ago, pt reports he called off work due to pain too severe to perform work activities.  Went to MD, imaging showing disc protrusion at L3 and stenosis at L1-2,  L5-S1. Referred to neuro specialist. Epidural injection ordered (not scheduled yet). PT referral with Neuro follow up in 4 weeks. Present symptoms (on day of initial evaluation): constant low back aching, mid to low back. Occasional numbness of B LE (R>L)      · Aggravating factors: standing on hard surfaces 5 min, walking, stairs ambulation, bending   · Relieving factors: recliner, pain medication     · Pain level: 6/10 presently, 9/10 worst, 5/10 best     Past Medical History/Comorbidities: Mr. Wing Lennon  has a past medical history of Allergic rhinitis due to allergen; Arthritis; Colon polyps (8/12/2016); Deficiency, lipoprotein (8/12/2016); [facility] (8/12/2016); Encounter for long-term (current) use of medications (8/12/2016); HLD (hyperlipidemia) (8/12/2016); HTN (hypertension) (8/12/2016); Obesity (8/12/2016); Other abnormal glucose (8/12/2016); Sinusitis, acute maxillary (8/12/2016); and URI (upper respiratory infection) (8/12/2016). Mr. Wing Lennon  has a past surgical history that includes tonsillectomy; adenoidectomy; and other surgical (2005). Social History/Living Environment:     lives with spouse   Prior Level of Function/Work/Activity:  Currently on disability   Dominant Side:         RIGHT  Other Clinical Tests:          MRI lumbar spine: (copied from radiology)   Disc desiccation and mild degenerative changes and facet arthropathy  throughout the lumbar spine. There is a significant central stenosis at L1-2;  the thecal sac measures 6 to 7 mm in AP diameter. At L4-3-4 there is a  broad-based left lateral disc protrusion impinging the enlarged L3 nerve root. Moderate foraminal stenoses at L5-S1. Previous Treatment Approaches:          Chiropractor   Current Medications:       Current Outpatient Prescriptions:     FLUoxetine (PROZAC) 20 mg tablet, Take 1 Tab by mouth daily. , Disp: 90 Tab, Rfl: 3    multivitamin (ONE A DAY) tablet, Take 1 Tab by mouth daily. , Disp: , Rfl:     Insulin Needles, Disposable, (NOVOFINE 32) 32 gauge x 1/4\" ndle, 1 misc daily. For Victoza pen, Disp: 100 Pen Needle, Rfl: 3    traMADol (ULTRAM) 50 mg tablet, Take 1 Tab by mouth every six (6) hours as needed for Pain. Max Daily Amount: 200 mg. Indications: Pain, Disp: 120 Tab, Rfl: 2    metoprolol succinate (TOPROL-XL) 100 mg tablet, Take 1 Tab by mouth daily. , Disp: 90 Tab, Rfl: 3    metroNIDAZOLE (METROGEL) 1 % topical gel, Apply 1 g to affected area daily. Use a thin layer to affected areas after washing, Disp: 60 g, Rfl: 5    lansoprazole (PREVACID) 30 mg capsule, Take 1 Cap by mouth Daily (before breakfast). , Disp: 90 Cap, Rfl: 3    lisinopril-hydroCHLOROthiazide (PRINZIDE, ZESTORETIC) 20-12.5 mg per tablet, Take 1 Tab by mouth daily. , Disp: 90 Tab, Rfl: 3    simvastatin (ZOCOR) 40 mg tablet, Take 1 Tab by mouth daily. , Disp: 90 Tab, Rfl: 3    Liraglutide (VICTOZA) 0.6 mg/0.1 mL (18 mg/3 mL) sub-q pen, 19mg/3ml pen         1.8mg per day  Indications: type 2 diabetes mellitus, Disp: 9 Syringe, Rfl: 3    doxycycline (ADOXA) 100 mg tablet, Take 1 Tab by mouth two (2) times a day. Indications: ACNE ROSACEA, Disp: 100 Tab, Rfl: 2    PEN NEEDLE, DIABETIC (NOVOFINE 32), 32 g by Does Not Apply route daily.  Indications: 32G x 6 MM,, Disp: , Rfl:       gabapentin 300 mg x 3    Date Last Reviewed:  11/22/2017     EXAMINATION: 10/11/17   Observation/Orthostatic Postural Assessment:          Standing:  · Left scap elevated   · B hips in slight flexion   · Decreased lumbar lordotic curve  · Left lumbar shift   Palpation:         Tender of mid lumbar region   ROM:            Lumbar spine Date:  8/9/17 Date:  10/11/17 Date:     Direction  Parameters Parameters Parameters   Flexion  25% cs 75%    Extension  25% 75%    Rotation  R: 25%  L: 15% R: 50%  L: 25%    Side bending  R: 50%  L: 25% cs  R: 50%  L: 50%    Hip IR Very limited B  Mild-mod limitation     Hip ER Mild limitation B cs Mild limitation cs    Hip flexion  R: 90 degrees  L: 100 degrees cs R: 90 deg  L: 100 deg cs    Cs= comparable sign (symptoms provoked)    Strength:            Lower quadrant    DATE  8/9/17 DATE  10/11/17   Hip flexion R: 4-  L: 4- R: 4 +  L: 4 cs   Hip Abduction  R: 4-  L: 4- R: 4  L: 4   Hip Extension  R: --  L: -- R: --  L: --   Knee Flexion  R: 4  L: 4- R: 4+  L: 4+   Knee Extension  R: 4+  L: 4- R: 4+  L:  4+   Ankle Dorsiflexion  R: 4   L: 4 R: 4+  L: 4+   Ankle Plantarflexion  R: 4  L:4  R: 4+  L: 4+          Special Tests:          None tested   Neurological Screen:        Myotomes: intact         Sensation: intact B LE    Functional Mobility:         Gait/Ambulation:  WBOS, ER B hips, increased lateral sway, reduces significantly with verbal cuing each visit- no carry over          Transfers:  Able to sit to stand 5/5 times without use of UE         Stairs:  Not tested    CLINICAL DECISION MAKING:   Outcome Measure: Tool Used: Modified Oswestry Low Back Pain Questionnaire  Score:  Initial: 38/50  Most Recent: 37/50 (Date: 10/11/17 )   Interpretation of Score: Each section is scored on a 0-5 scale, 5 representing the greatest disability. The scores of each section are added together for a total score of 50. Medical Necessity:   · Patient is expected to demonstrate progress in strength and range of motion to decrease assistance required with ADLs as well as work related activities. .  Reason for Services/Other Comments:  · Patient continues to require modification of therapeutic interventions to increase complexity of exercises. TREATMENT:   (In addition to Assessment/Re-Assessment sessions the following treatments were rendered)  Pre-treatment Symptoms/Complaints: Pt states he is doing ok today. He woke up with pain 8/10 and is about 7/10 at this time. Pain: Initial:low back and hip    7/10  Post Session:  4/10 low back only     Aquatic Therapy (45 minutes):  Aquatic treatment performed per flow grid for Decreased static/dynamic balance and reactive control, Decreased range of motion, Decreased activity endurance, Decompression, Ease of movement and Low impact and reduced weight bearing activity. Cues provided for gait, posture and ex. Aquatic Exercise Log       Date  11/22 Date   Date   Date   Date     Activity/ Exercise Parameters Parameters Parameters Parameters Parameters   Walking forward 6       Walking backward 6       Walking sideways 6         Marching 6         Goose Step 6         Tip toes 3         Heels 3         Lunges        Side step squats        LE Exercises 4#         Hip Flex/Ext 12         Hip Abd/Add 12         Hip IR/ER          Calf raises 15         Knee Flex 15         Squats          Leg Circles 10/10         Step Ups 10       UE Exercises          Squeeze In          Push Down          Pull Down          Bicep/Tricep        Rows/Press outs         Chi Positions          Trunk Rotation        Deep H2O/ Noodles 7' with 4#         Stabilization          Arms only          Legs only Jog 2 min       Cross   [place] 2 min         Scissors 2 min         Crab walk        Lower abdominal   work           Cardio          Jogging        Lap   Swimming          Stretches          Hamstrings          Heelcords          Piriformis          Neck                     Treatment/Session Assessment:     · Response to Treatment: Did well with aquatic therapy. Has decreased pain in deeper levels of water. Reported pain 4/10 after session. · Recommendations/Intent for next treatment session:  Continues to assess effects of traction treatment and perform again making modifications as appropriate. Continue exercises for stretching and strengthening focusing on T-L spine as well as hip joint. Functional activities as able.      Future Appointments  Date Time Provider Jonas Denton   11/29/2017 1:00 PM Deirdre SAINT-DIÉ, PT [facility]   1/18/2018 9:20 AM PIE LAB PIE PIE   1/25/2018 2:30 PM MD LUCILA Burton   2/20/2018 8:10 AM PIE LAB PIE PIE   2/23/2018 3:30 PM MD LUCILA Burotn       Total Treatment Duration:       Estephanie Olson, PT

## 2017-11-22 NOTE — PROGRESS NOTES
Frank Ortega  : 1960 [address] [address] at [address], [address] [place] [address], [address]  [phone]   [phone]         OUTPATIENT PHYSICAL THERAPY:Daily Note 2017    ICD-10: Treatment Diagnosis: low back pain (M54.5)  Precautions/Allergies:   Review of patient's allergies indicates no known allergies. Fall Risk Score: 2 (? 5 = High Risk)  MD Orders: evaluate and treat MEDICAL/REFERRING DIAGNOSIS:  Low back pain [M54.5]   DATE OF ONSET: chronic   REFERRING PHYSICIAN: Jamar Morales MD  RETURN PHYSICIAN APPOINTMENT: 10/9/17     INITIAL ASSESSMENT:  Mr. Louise Ordonez has attended 13 scheduled PT visits treating low back pain. Overall, he has made slow progress with mobility and strength, however, pain continues to be consistently moderate with occasional exacerbations of low back and left hip. Some symptoms suggested hip pathology as well. He has been educated on body mechanics with house work activities and as shown independence with his HEP and reports compliance with performing at home. Due to left hip and low back pain during weightbearing activates, the ability to progress patient to more functional activities is limited. He would benefit from continued skilled PT 3 x per week to continued working toward d/c goals and return to work, with the addition of aquatic therapy as one of those 3 sessions each week to work on more functional core and hip strength/educrance until able to perform on land. PROBLEM LIST (Impacting functional limitations):  1. Decreased Strength  2. Decreased ADL/Functional Activities  3. Increased Pain  4. Decreased Flexibility/Joint Mobility  5. Decreased [place] with Home Exercise Program INTERVENTIONS PLANNED:  1. Cold  2. Heat  3. Home Exercise Program (HEP)  4. Manual Therapy  5. Neuromuscular Re-education/Strengthening  6. Range of Motion (ROM)  7. Therapeutic Exercise/Strengthening   8.  Aquatic therapy TREATMENT PLAN:  Effective Dates: 10/9/17 TO 12/9/17. Frequency/Duration: 3 times a week for 6 weeks  GOALS: (Goals have been discussed and agreed upon with patient.)  SHORT-TERM FUNCTIONAL GOALS: Time Frame: 2-3 weeks   1. Pt will be independent with HEP focusing on core stability and promoting good spinal alignment. (met)  2. Pt will report no symptoms of LE for 1-2 weeks demonstrating centralization of symptoms. (met)  3. Pt will report pain level does not exceed 5/10 in a 1-2 week period of time to demonstrate pain management. (ongoing)   4. Pt will report improved sleep with less disturbance from back pain. (not met)  DISCHARGE GOALS: Time Frame: 4-6 weeks   1. Pt will improve Oswestry score by at least 10 points. (ongoing)   2. Pt will demonstrate increased AROM of lumbar spine all planes ~ 25% or greater without report of pain to improve functional mobility. (not met)  3. Pt will be able to sustain regular exercise routine including aerobic exercise 3+ times per week without increased symptoms to encourage healthy lifestyle. (not met)   4. Pt will demonstrate increased B LE strength to at least 4+/5 B to aid with functional mobility. (not met)     Rehabilitation Potential For Stated Goals: Good                The information in this section was collected on 8/7/17 (except where otherwise noted). HISTORY:   History of Present Injury/Illness (Reason for Referral):  Pt reports several years low back pain with a severe episode after lifting heavy objects in 2013 causing left low back L LE pain. Pt states that he has been receiving chiropractic treatments since then for pain management however, pain continued to bother him especially during/after work shifts at [employer]. He states that he is on his feet on concrete all day long and recently back pain interfering with job activities. In 3 months ago, pt reports he called off work due to pain too severe to perform work activities.  Went to MD, imaging showing disc protrusion at L3 and stenosis at L1-2,  L5-S1. Referred to neuro specialist. Epidural injection ordered (not scheduled yet). PT referral with Neuro follow up in 4 weeks. Present symptoms (on day of initial evaluation): constant low back aching, mid to low back. Occasional numbness of B LE (R>L)      · Aggravating factors: standing on hard surfaces 5 min, walking, stairs ambulation, bending   · Relieving factors: recliner, pain medication     · Pain level: 6/10 presently, 9/10 worst, 5/10 best     Past Medical History/Comorbidities: Mr. Marcela Ty  has a past medical history of Allergic rhinitis due to allergen; Arthritis; Colon polyps (8/12/2016); Deficiency, lipoprotein (8/12/2016); Diabetes mellitus type 2, [facility] ([facility] (8/12/2016); Encounter for long-term (current) use of medications (8/12/2016); HLD (hyperlipidemia) (8/12/2016); HTN (hypertension) (8/12/2016); Obesity (8/12/2016); Other abnormal glucose (8/12/2016); Sinusitis, acute maxillary (8/12/2016); and URI (upper respiratory infection) (8/12/2016). Mr. Marcela Ty  has a past surgical history that includes tonsillectomy; adenoidectomy; and other surgical (2005). Social History/Living Environment:     lives with spouse   Prior Level of Function/Work/Activity:  Currently on disability   Dominant Side:         RIGHT  Other Clinical Tests:          MRI lumbar spine: (copied from radiology)   Disc desiccation and mild degenerative changes and facet arthropathy  throughout the lumbar spine. There is a significant central stenosis at L1-2;  the thecal sac measures 6 to 7 mm in AP diameter. At L4-3-4 there is a  broad-based left lateral disc protrusion impinging the enlarged L3 nerve root. Moderate foraminal stenoses at L5-S1. Previous Treatment Approaches:          Chiropractor   Current Medications:       Current Outpatient Prescriptions:     FLUoxetine (PROZAC) 20 mg tablet, Take 1 Tab by mouth daily. , Disp: 90 Tab, Rfl: 3    multivitamin (ONE A DAY) tablet, Take 1 Tab by mouth daily. , Disp: , Rfl:     Insulin Needles, Disposable, (NOVOFINE 32) 32 gauge x 1/4\" ndle, 1 misc daily. For Victoza pen, Disp: 100 Pen Needle, Rfl: 3    traMADol (ULTRAM) 50 mg tablet, Take 1 Tab by mouth every six (6) hours as needed for Pain. Max Daily Amount: 200 mg. Indications: Pain, Disp: 120 Tab, Rfl: 2    metoprolol succinate (TOPROL-XL) 100 mg tablet, Take 1 Tab by mouth daily. , Disp: 90 Tab, Rfl: 3    metroNIDAZOLE (METROGEL) 1 % topical gel, Apply 1 g to affected area daily. Use a thin layer to affected areas after washing, Disp: 60 g, Rfl: 5    lansoprazole (PREVACID) 30 mg capsule, Take 1 Cap by mouth Daily (before breakfast). , Disp: 90 Cap, Rfl: 3    lisinopril-hydroCHLOROthiazide (PRINZIDE, ZESTORETIC) 20-12.5 mg per tablet, Take 1 Tab by mouth daily. , Disp: 90 Tab, Rfl: 3    simvastatin (ZOCOR) 40 mg tablet, Take 1 Tab by mouth daily. , Disp: 90 Tab, Rfl: 3    Liraglutide (VICTOZA) 0.6 mg/0.1 mL (18 mg/3 mL) sub-q pen, 19mg/3ml pen         1.8mg per day  Indications: type 2 diabetes mellitus, Disp: 9 Syringe, Rfl: 3    doxycycline (ADOXA) 100 mg tablet, Take 1 Tab by mouth two (2) times a day. Indications: ACNE ROSACEA, Disp: 100 Tab, Rfl: 2    PEN NEEDLE, DIABETIC (NOVOFINE 32), 32 g by Does Not Apply route daily.  Indications: 32G x 6 MM,, Disp: , Rfl:       gabapentin 300 mg x 3    Date Last Reviewed:  11/15/2017     EXAMINATION: 10/11/17   Observation/Orthostatic Postural Assessment:          Standing:  · Left scap elevated   · B hips in slight flexion   · Decreased lumbar lordotic curve  · Left lumbar shift   Palpation:         Tender of mid lumbar region   ROM:            Lumbar spine Date:  8/9/17 Date:  10/11/17 Date:     Direction  Parameters Parameters Parameters   Flexion  25% cs 75%    Extension  25% 75%    Rotation  R: 25%  L: 15% R: 50%  L: 25%    Side bending  R: 50%  L: 25% cs  R: 50%  L: 50%    Hip IR Very limited B  Mild-mod limitation     Hip ER Mild limitation B cs Mild limitation cs    Hip flexion  R: 90 degrees  L: 100 degrees cs R: 90 deg  L: 100 deg cs    Cs= comparable sign (symptoms provoked)    Strength:            Lower quadrant    DATE  8/9/17 DATE  10/11/17   Hip flexion R: 4-  L: 4- R: 4 +  L: 4 cs   Hip Abduction  R: 4-  L: 4- R: 4  L: 4   Hip Extension  R: --  L: -- R: --  L: --   Knee Flexion  R: 4  L: 4- R: 4+  L: 4+   Knee Extension  R: 4+  L: 4- R: 4+  L:  4+   Ankle Dorsiflexion  R: 4   L: 4 R: 4+  L: 4+   Ankle Plantarflexion  R: 4  L:4  R: 4+  L: 4+          Special Tests:          None tested   Neurological Screen:        Myotomes: intact         Sensation: intact B LE    Functional Mobility:         Gait/Ambulation:  WBOS, ER B hips, increased lateral sway, reduces significantly with verbal cuing each visit- no carry over          Transfers:  Able to sit to stand 5/5 times without use of UE         Stairs:  Not tested    CLINICAL DECISION MAKING:   Outcome Measure: Tool Used: Modified Oswestry Low Back Pain Questionnaire  Score:  Initial: 38/50  Most Recent: 37/50 (Date: 10/11/17 )   Interpretation of Score: Each section is scored on a 0-5 scale, 5 representing the greatest disability. The scores of each section are added together for a total score of 50. Medical Necessity:   · Patient is expected to demonstrate progress in strength and range of motion to decrease assistance required with ADLs as well as work related activities. .  Reason for Services/Other Comments:  · Patient continues to require modification of therapeutic interventions to increase complexity of exercises. TREATMENT:   (In addition to Assessment/Re-Assessment sessions the following treatments were rendered)  Pre-treatment Symptoms/Complaints:pt states he had injection and has been hurting since. Pain: Initial: left sided low back and hip pain     8/10  Post Session:  5/10 back,       Aquatic Therapy (45 minutes):  Aquatic treatment performed per flow grid for Decreased activity endurance, Decompression, Ease of movement and Low impact and reduced weight bearing activity. Cues provided for posture, ex and gait. Aquatic Exercise Log       Date  11/15 Date   Date   Date   Date     Activity/ Exercise Parameters Parameters Parameters Parameters Parameters   Walking forward 6       Walking backward 4       Walking sideways 4         Marching 4         Goose Step          Tip toes          Heels          Lunges        Side step squats        LE Exercises          Hip Flex/Ext 10         Hip Abd/Add 10         Hip IR/ER          Calf raises 10 with stretch off edge of step         Knee Flex 10         Squats          Leg Circles 10/10         Step Ups 10       UE Exercises          Squeeze In          Push Down          Pull Down          Bicep/Tricep        Rows/Press outs         Chi Positions          Trunk Rotation        Deep H2O/ Noodles          Stabilization          Arms only          Legs only        Mendezdami Thomas walk        Lower abdominal   work           Cardio          Jogging        Lap   Swimming          Stretches          Hamstrings 3 x 20 sec         Heelcords In lunge position on step 3 x 20sec         Piriformis          Neck            Treatment/Session Assessment: Pt did well with pool therapy. · Response to Treatment:  Pt stated he felt looser after session. · Recommendations/Intent for next treatment session:  Assess effects of traction treatment and perform again making modifications as appropriate. Continue exercises for stretching and strengthening focusing on T-L spine as well as hip joint. Continue aquatic once per week.     Future Appointments  Date Time Provider Jonas Denton   11/22/2017 1:00 PM Deirdre SAINT-DIÉ, PT [facility]   11/29/2017 1:00 PM JANELLE [facility]   1/18/2018 9:20 AM PIE LAB PIE PIE   1/25/2018 2:30 PM MD LUCILA Morin   2/20/2018 8:10 AM PIE LAB PIE PIE   2/23/2018 3:30 PM MD LUCILA Morin       Total Treatment Duration:  45 min       Ramon Zabala, PT

## 2017-11-29 ENCOUNTER — APPOINTMENT (OUTPATIENT)
Dept: PHYSICAL THERAPY | Age: 57
End: 2017-11-29
Payer: COMMERCIAL

## 2017-11-29 ENCOUNTER — HOSPITAL ENCOUNTER (OUTPATIENT)
Dept: PHYSICAL THERAPY | Age: 57
Discharge: HOME | End: 2017-11-29
Payer: COMMERCIAL

## 2017-12-01 ENCOUNTER — HOSPITAL ENCOUNTER (OUTPATIENT)
Dept: PHYSICAL THERAPY | Age: 57
Discharge: HOME | End: 2017-12-01
Payer: COMMERCIAL

## 2017-12-01 PROCEDURE — 97110 THERAPEUTIC EXERCISES: CPT

## 2017-12-01 NOTE — PROGRESS NOTES
Dennis Ramirez  : 1960 [id number] at [place] [address] [address] [place] [address], [facility], [address] [place] [address]  [phone]   [phone]         OUTPATIENT PHYSICAL THERAPY:Daily Note 2017    ICD-10: Treatment Diagnosis: low back pain (M54.5)  Precautions/Allergies:   Review of patient's allergies indicates no known allergies. Fall Risk Score: 2 (? 5 = High Risk)  MD Orders: evaluate and treat MEDICAL/REFERRING DIAGNOSIS:  Low back pain [M54.5]   DATE OF ONSET: chronic   REFERRING PHYSICIAN: Garcia Leger MD  RETURN PHYSICIAN APPOINTMENT: 10/9/17     INITIAL ASSESSMENT:  Mr. Rachel Cardenas    has attended 13 scheduled PT visits treating low back pain. Overall, he has made slow progress with mobility and strength, however, pain continues to be consistently moderate with occasional exacerbations of low back and left hip. Some symptoms suggested hip pathology as well. He has been educated on body mechanics with house work activities and as shown independence with his HEP and reports compliance with performing at home. Due to left hip and low back pain during weightbearing activates, the ability to progress patient to more functional activities is limited. He would benefit from continued skilled PT 3 x per week to continued working toward d/c goals and return to work, with the addition of aquatic therapy as one of those 3 sessions each week to work on more functional core and hip strength/educrance until able to perform on land. GOALS: (Goals have been discussed and agreed upon with patient.)  SHORT-TERM FUNCTIONAL GOALS:   1. Pt will be independent with HEP focusing on core stability and promoting good spinal alignment. (met)  2. Pt will report no symptoms of LE for 1-2 weeks demonstrating centralization of symptoms. (met)  3. Pt will report pain level does not exceed 5/10 in a 1-2 week period of time to demonstrate pain management. (met)   4.  Pt will report improved sleep with less disturbance from back pain. (not met)  DISCHARGE GOALS:   1. Pt will improve Oswestry score by at least 10 points. (not met)   2. Pt will demonstrate increased AROM of lumbar spine all planes ~ 25% or greater without report of pain to improve functional mobility. (not met)  3. Pt will be able to sustain regular exercise routine including aerobic exercise 3+ times per week without increased symptoms to encourage healthy lifestyle. (not met)   4. Pt will demonstrate increased B LE strength to at least 4+/5 B to aid with functional mobility. (not met)     Rehabilitation Potential For Stated Goals: Good                The information in this section was collected on 8/7/17 (except where otherwise noted). HISTORY:   History of Present Injury/Illness (Reason for Referral):  Pt reports several years low back pain with a severe episode after lifting heavy objects in 2013 causing left low back L LE pain. Pt states that he has been receiving chiropractic treatments since then for pain management however, pain continued to bother him especially during/after work shifts at [employer]. He states that he is on his feet on concrete all day long and recently back pain interfering with job activities. In 3 months ago, pt reports he called off work due to pain too severe to perform work activities. Went to MD, imaging showing disc protrusion at L3 and stenosis at L1-2,  L5-S1. Referred to neuro specialist. Epidural injection ordered (not scheduled yet). PT referral with Neuro follow up in 4 weeks. Present symptoms (on day of initial evaluation): constant low back aching, mid to low back.   Occasional numbness of B LE (R>L)      · Aggravating factors: standing on hard surfaces 5 min, walking, stairs ambulation, bending   · Relieving factors: recliner, pain medication     · Pain level: 6/10 presently, 9/10 worst, 5/10 best     Past Medical History/Comorbidities: Mr. Forde Patient  has a past medical history of Allergic rhinitis due to allergen; Arthritis; Colon polyps (8/12/2016); Deficiency, lipoprotein (8/12/2016); Diabetes mellitus type 2, controlled ([facility] [address] (8/12/2016); Encounter for long-term (current) use of medications (8/12/2016); HLD (hyperlipidemia) (8/12/2016); HTN (hypertension) (8/12/2016); Obesity (8/12/2016); Other abnormal glucose (8/12/2016); Sinusitis, acute maxillary (8/12/2016); and URI (upper respiratory infection) (8/12/2016). Mr. Rylan Jennings  has a past surgical history that includes tonsillectomy; adenoidectomy; and other surgical (2005). Social History/Living Environment:     lives with spouse   Prior Level of Function/Work/Activity:  Currently on disability   Dominant Side:         RIGHT  Other Clinical Tests:          MRI lumbar spine: (copied from radiology)   Disc desiccation and mild degenerative changes and facet arthropathy  throughout the lumbar spine. There is a significant central stenosis at L1-2;  the thecal sac measures 6 to 7 mm in AP diameter. At L4-3-4 there is a  broad-based left lateral disc protrusion impinging the enlarged L3 nerve root. Moderate foraminal stenoses at L5-S1. Previous Treatment Approaches:          Chiropractor   Current Medications:       Current Outpatient Prescriptions:     FLUoxetine (PROZAC) 20 mg tablet, Take 1 Tab by mouth daily. , Disp: 90 Tab, Rfl: 3    multivitamin (ONE A DAY) tablet, Take 1 Tab by mouth daily. , Disp: , Rfl:     Insulin Needles, Disposable, (NOVOFINE 32) 32 gauge x 1/4\" ndle, 1 misc daily. For Victoza pen, Disp: 100 Pen Needle, Rfl: 3    traMADol (ULTRAM) 50 mg tablet, Take 1 Tab by mouth every six (6) hours as needed for Pain. Max Daily Amount: 200 mg. Indications: Pain, Disp: 120 Tab, Rfl: 2    metoprolol succinate (TOPROL-XL) 100 mg tablet, Take 1 Tab by mouth daily. , Disp: 90 Tab, Rfl: 3    metroNIDAZOLE (METROGEL) 1 % topical gel, Apply 1 g to affected area daily.  Use a thin layer to affected areas after washing, Disp: 60 g, Rfl: 5   lansoprazole (PREVACID) 30 mg capsule, Take 1 Cap by mouth Daily (before breakfast). , Disp: 90 Cap, Rfl: 3    lisinopril-hydroCHLOROthiazide (PRINZIDE, ZESTORETIC) 20-12.5 mg per tablet, Take 1 Tab by mouth daily. , Disp: 90 Tab, Rfl: 3    simvastatin (ZOCOR) 40 mg tablet, Take 1 Tab by mouth daily. , Disp: 90 Tab, Rfl: 3    Liraglutide (VICTOZA) 0.6 mg/0.1 mL (18 mg/3 mL) sub-q pen, 19mg/3ml pen         1.8mg per day  Indications: type 2 diabetes mellitus, Disp: 9 Syringe, Rfl: 3    doxycycline (ADOXA) 100 mg tablet, Take 1 Tab by mouth two (2) times a day. Indications: ACNE ROSACEA, Disp: 100 Tab, Rfl: 2    PEN NEEDLE, DIABETIC (NOVOFINE 32), 32 g by Does Not Apply route daily.  Indications: 32G x 6 MM,, Disp: , Rfl:       gabapentin 300 mg x 3    Date Last Reviewed:  12/1/2017     EXAMINATION: 10/11/17   Observation/Orthostatic Postural Assessment:          Standing:  · Left scap elevated   · B hips in slight flexion   · Decreased lumbar lordotic curve  · Left lumbar shift   Palpation:         Tender of mid lumbar region   ROM:            Lumbar spine Date:  8/9/17 Date:  10/11/17 Date:  12/1/17   Direction  Parameters Parameters Parameters   Flexion  25% cs 75% 75%   Extension  25% 75% 75%   Rotation  R: 25%  L: 15% R: 50%  L: 25% R: 50%  L: 50%    Side bending  R: 50%  L: 25% cs  R: 50%  L: 50% R: 50%  L: 50    Hip IR Very limited B  Mild-mod limitation     Hip ER Mild limitation B cs Mild limitation cs    Hip flexion  R: 90 degrees  L: 100 degrees cs R: 90 deg  L: 100 deg cs    Cs= comparable sign (symptoms provoked)    Strength:            Lower quadrant    DATE  8/9/17 DATE  12/1/17   Hip flexion R: 4-  L: 4- R: 4 +  L: 4 cs   Hip Abduction  R: 4-  L: 4- R: 4  L: 4   Hip Extension  R: --  L: -- R: --  L: --   Knee Flexion  R: 4  L: 4- R: 4+  L: 4+   Knee Extension  R: 4+  L: 4- R: 4+  L:  4+   Ankle Dorsiflexion  R: 4   L: 4 R: 4+  L: 4+   Ankle Plantarflexion  R: 4  L:4  R: 4+  L: 4+          Special Tests:          None tested   Neurological Screen:        Myotomes: intact         Sensation: intact B LE    Functional Mobility:         Gait/Ambulation:  WBOS, ER B hips, increased lateral sway, reduces significantly with verbal cuing each visit- no carry over          Transfers:  Able to sit to stand 5/5 times without use of UE         Stairs:  Not tested    CLINICAL DECISION MAKING:   Outcome Measure: Tool Used: Modified Oswestry Low Back Pain Questionnaire  Score:  Initial: 38/50  Most Recent: 35/50 (Date: 12/1/17 )   Interpretation of Score: Each section is scored on a 0-5 scale, 5 representing the greatest disability. The scores of each section are added together for a total score of 50. Medical Necessity:   · Patient is expected to demonstrate progress in strength and range of motion to decrease assistance required with ADLs as well as work related activities. .  Reason for Services/Other Comments:  · Patient continues to require modification of therapeutic interventions to increase complexity of exercises. Treatment: 12/1/2017    Subjective: pt states no changes with low back and hip pain over the last 6 weeks. He enjoys therapy and feels temporary relief but no long term. Re-evaluated pt today and will discharge after next aquatic session (within dates of POC). THERAPEUTIC EXERCISE: (30 min  minutes):  Exercises per grid below to improve mobility, strength and coordination. Required moderate visual, verbal, manual and tactile cues to promote proper body alignment, promote proper body posture and promote proper body breathing techniques. Progressed resistance, range, repetitions and complexity of movement as indicated.       Date:  12/1/17 Date:   Date:     Activity/Exercise Parameters Parameters Parameters   Education  HEP      AROM Lumbar and t-spine all planes      nustep  x 15 min level 4     Gait  5 min                             Yolanda Koo, PT

## 2017-12-04 ENCOUNTER — APPOINTMENT (OUTPATIENT)
Dept: PHYSICAL THERAPY | Age: 57
End: 2017-12-04
Payer: COMMERCIAL

## 2017-12-06 ENCOUNTER — HOSPITAL ENCOUNTER (OUTPATIENT)
Dept: PHYSICAL THERAPY | Age: 57
Discharge: HOME | End: 2017-12-06
Payer: COMMERCIAL

## 2017-12-06 PROCEDURE — 97113 AQUATIC THERAPY/EXERCISES: CPT

## 2017-12-06 NOTE — PROGRESS NOTES
Melody Lebron  : 1960 [address] at [place] [address] [address] [place] [address], [facility], [address] [place] [address] Road  [phone]   [phone]         OUTPATIENT PHYSICAL THERAPY:Daily Note 2017    ICD-10: Treatment Diagnosis: low back pain (M54.5)  Precautions/Allergies:   Review of patient's allergies indicates no known allergies. Fall Risk Score: 2 (? 5 = High Risk)  MD Orders: evaluate and treat MEDICAL/REFERRING DIAGNOSIS:  Low back pain [M54.5]   DATE OF ONSET: chronic   REFERRING PHYSICIAN: Dori Gonzáles MD  RETURN PHYSICIAN APPOINTMENT: 10/9/17     INITIAL ASSESSMENT:  Mr. Wing Lennon has attended 13 scheduled PT visits treating low back pain. Overall, he has made slow progress with mobility and strength, however, pain continues to be consistently moderate with occasional exacerbations of low back and left hip. Some symptoms suggested hip pathology as well. He has been educated on body mechanics with house work activities and as shown independence with his HEP and reports compliance with performing at home. Due to left hip and low back pain during weightbearing activates, the ability to progress patient to more functional activities is limited. He would benefit from continued skilled PT 3 x per week to continued working toward d/c goals and return to work, with the addition of aquatic therapy as one of those 3 sessions each week to work on more functional core and hip strength/educrance until able to perform on land. PROBLEM LIST (Impacting functional limitations):  1. Decreased Strength  2. Decreased ADL/Functional Activities  3. Increased Pain  4. Decreased Flexibility/Joint Mobility  5. Decreased Gordon with Home Exercise Program INTERVENTIONS PLANNED:  1. Cold  2. Heat  3. Home Exercise Program (HEP)  4. Manual Therapy  5. Neuromuscular Re-education/Strengthening  6. Range of Motion (ROM)  7. Therapeutic Exercise/Strengthening   8.  Aquatic therapy TREATMENT PLAN:  Effective Dates: 10/9/17 TO 12/9/17. Frequency/Duration: 3 times a week for 6 weeks  GOALS: (Goals have been discussed and agreed upon with patient.)  SHORT-TERM FUNCTIONAL GOALS: Time Frame: 2-3 weeks   1. Pt will be independent with HEP focusing on core stability and promoting good spinal alignment. (met)  2. Pt will report no symptoms of LE for 1-2 weeks demonstrating centralization of symptoms. (met)  3. Pt will report pain level does not exceed 5/10 in a 1-2 week period of time to demonstrate pain management. (ongoing)   4. Pt will report improved sleep with less disturbance from back pain. (not met)  DISCHARGE GOALS: Time Frame: 4-6 weeks   1. Pt will improve Oswestry score by at least 10 points. (ongoing)   2. Pt will demonstrate increased AROM of lumbar spine all planes ~ 25% or greater without report of pain to improve functional mobility. (not met)  3. Pt will be able to sustain regular exercise routine including aerobic exercise 3+ times per week without increased symptoms to encourage healthy lifestyle. (not met)   4. Pt will demonstrate increased B LE strength to at least 4+/5 B to aid with functional mobility. (not met)     Rehabilitation Potential For Stated Goals: Good                The information in this section was collected on 8/7/17 (except where otherwise noted). HISTORY:   History of Present Injury/Illness (Reason for Referral):  Pt reports several years low back pain with a severe episode after lifting heavy objects in 2013 causing left low back L LE pain. Pt states that he has been receiving chiropractic treatments since then for pain management however, pain continued to bother him especially during/after work shifts at [employer]. He states that he is on his feet on concrete all day long and recently back pain interfering with job activities. In 3 months ago, pt reports he called off work due to pain too severe to perform work activities.  Went to MD, imaging showing disc protrusion at L3 and stenosis at L1-2,  L5-S1. Referred to neuro specialist. Epidural injection ordered (not scheduled yet). PT referral with Neuro follow up in 4 weeks. Present symptoms (on day of initial evaluation): constant low back aching, mid to low back. Occasional numbness of B LE (R>L)      · Aggravating factors: standing on hard surfaces 5 min, walking, stairs ambulation, bending   · Relieving factors: recliner, pain medication     · Pain level: 6/10 presently, 9/10 worst, 5/10 best     Past Medical History/Comorbidities: Mr. Bee Miranda  has a past medical history of Allergic rhinitis due to allergen; Arthritis; Colon polyps (8/12/2016); Deficiency, lipoprotein (8/12/2016); [facility] (8/12/2016); Encounter for long-term (current) use of medications (8/12/2016); HLD (hyperlipidemia) (8/12/2016); HTN (hypertension) (8/12/2016); Obesity (8/12/2016); Other abnormal glucose (8/12/2016); Sinusitis, acute maxillary (8/12/2016); and URI (upper respiratory infection) (8/12/2016). Mr. Bee Miranda  has a past surgical history that includes tonsillectomy; adenoidectomy; and other surgical (2005). Social History/Living Environment:     lives with spouse   Prior Level of Function/Work/Activity:  Currently on disability   Dominant Side:         RIGHT  Other Clinical Tests:          MRI lumbar spine: (copied from radiology)   Disc desiccation and mild degenerative changes and facet arthropathy  throughout the lumbar spine. There is a significant central stenosis at L1-2;  the thecal sac measures 6 to 7 mm in AP diameter. At L4-3-4 there is a  broad-based left lateral disc protrusion impinging the enlarged L3 nerve root. Moderate foraminal stenoses at L5-S1. Previous Treatment Approaches:          Chiropractor   Current Medications:       Current Outpatient Prescriptions:     FLUoxetine (PROZAC) 20 mg tablet, Take 1 Tab by mouth daily. , Disp: 90 Tab, Rfl: 3    multivitamin (ONE A DAY) tablet, Take 1 Tab by mouth daily. , Disp: , Rfl:     Insulin Needles, Disposable, (NOVOFINE 32) 32 gauge x 1/4\" ndle, 1 misc daily. For Victoza pen, Disp: 100 Pen Needle, Rfl: 3    traMADol (ULTRAM) 50 mg tablet, Take 1 Tab by mouth every six (6) hours as needed for Pain. Max Daily Amount: 200 mg. Indications: Pain, Disp: 120 Tab, Rfl: 2    metoprolol succinate (TOPROL-XL) 100 mg tablet, Take 1 Tab by mouth daily. , Disp: 90 Tab, Rfl: 3    metroNIDAZOLE (METROGEL) 1 % topical gel, Apply 1 g to affected area daily. Use a thin layer to affected areas after washing, Disp: 60 g, Rfl: 5    lansoprazole (PREVACID) 30 mg capsule, Take 1 Cap by mouth Daily (before breakfast). , Disp: 90 Cap, Rfl: 3    lisinopril-hydroCHLOROthiazide (PRINZIDE, ZESTORETIC) 20-12.5 mg per tablet, Take 1 Tab by mouth daily. , Disp: 90 Tab, Rfl: 3    simvastatin (ZOCOR) 40 mg tablet, Take 1 Tab by mouth daily. , Disp: 90 Tab, Rfl: 3    Liraglutide (VICTOZA) 0.6 mg/0.1 mL (18 mg/3 mL) sub-q pen, 19mg/3ml pen         1.8mg per day  Indications: type 2 diabetes mellitus, Disp: 9 Syringe, Rfl: 3    doxycycline (ADOXA) 100 mg tablet, Take 1 Tab by mouth two (2) times a day. Indications: ACNE ROSACEA, Disp: 100 Tab, Rfl: 2    PEN NEEDLE, DIABETIC (NOVOFINE 32), 32 g by Does Not Apply route daily.  Indications: 32G x 6 MM,, Disp: , Rfl:       gabapentin 300 mg x 3    Date Last Reviewed:  12/6/2017     EXAMINATION: 10/11/17   Observation/Orthostatic Postural Assessment:          Standing:  · Left scap elevated   · B hips in slight flexion   · Decreased lumbar lordotic curve  · Left lumbar shift   Palpation:         Tender of mid lumbar region   ROM:            Lumbar spine Date:  8/9/17 Date:  10/11/17 Date:     Direction  Parameters Parameters Parameters   Flexion  25% cs 75%    Extension  25% 75%    Rotation  R: 25%  L: 15% R: 50%  L: 25%    Side bending  R: 50%  L: 25% cs  R: 50%  L: 50%    Hip IR Very limited B  Mild-mod limitation     Hip ER Mild limitation B cs Mild limitation cs    Hip flexion  R: 90 degrees  L: 100 degrees cs R: 90 deg  L: 100 deg cs    Cs= comparable sign (symptoms provoked)    Strength:            Lower quadrant    DATE  8/9/17 DATE  10/11/17   Hip flexion R: 4-  L: 4- R: 4 +  L: 4 cs   Hip Abduction  R: 4-  L: 4- R: 4  L: 4   Hip Extension  R: --  L: -- R: --  L: --   Knee Flexion  R: 4  L: 4- R: 4+  L: 4+   Knee Extension  R: 4+  L: 4- R: 4+  L:  4+   Ankle Dorsiflexion  R: 4   L: 4 R: 4+  L: 4+   Ankle Plantarflexion  R: 4  L:4  R: 4+  L: 4+          Special Tests:          None tested   Neurological Screen:        Myotomes: intact         Sensation: intact B LE    Functional Mobility:         Gait/Ambulation:  WBOS, ER B hips, increased lateral sway, reduces significantly with verbal cuing each visit- no carry over          Transfers:  Able to sit to stand 5/5 times without use of UE         Stairs:  Not tested    CLINICAL DECISION MAKING:   Outcome Measure: Tool Used: Modified Oswestry Low Back Pain Questionnaire  Score:  Initial: 38/50  Most Recent: 37/50 (Date: 10/11/17 )   Interpretation of Score: Each section is scored on a 0-5 scale, 5 representing the greatest disability. The scores of each section are added together for a total score of 50. Medical Necessity:   · Patient is expected to demonstrate progress in strength and range of motion to decrease assistance required with ADLs as well as work related activities. .  Reason for Services/Other Comments:  · Patient continues to require modification of therapeutic interventions to increase complexity of exercises. TREATMENT:   (In addition to Assessment/Re-Assessment sessions the following treatments were rendered)  Pre-treatment Symptoms/Complaints: Pt states he is not doing much better- about the same. Pain: Initial:    7/10  Post Session:  4/10      Aquatic Therapy (45 minutes):  Aquatic treatment performed per flow grid for Decreased static/dynamic balance and reactive control, Decreased range of motion, Decreased activity endurance, Decompression, Ease of movement and Low impact and reduced weight bearing activity. Cues provided for gait, posture and ex. Aquatic Exercise Log       Date  11/22 Date  12/6 Date   Date   Date     Activity/ Exercise Parameters Parameters Parameters Parameters Parameters   Walking forward 6 6      Walking backward 6 6      Walking sideways 6 6        Marching 6 6        Goose Step 6 6        Tip toes 3 3        Heels 3 3        Lunges        Side step squats        LE Exercises 4# 4#        Hip Flex/Ext 12 12        Hip Abd/Add 12 12        Hip IR/ER          Calf raises 15 12        Knee Flex 15 12        Squats          Leg Circles 10/10 10/10        Step Ups 10 10      UE Exercises          Squeeze In          Push Down          Pull Down          Bicep/Tricep        Rows/Press outs         Chi Positions          Trunk Rotation        Deep H2O/ Noodles 7' with 4# 7' with 4#        Stabilization          Arms only          Legs only Jog 2 min Jog 2 min      Cross   Country 2 min 2 min        Scissors 2 min 2 min        Crab walk        Lower abdominal   work           Cardio          Jogging        Lap   Swimming          Stretches          Hamstrings          Heelcords          Piriformis          Neck                     Treatment/Session Assessment:     · Response to Treatment: Did well with aquatic therapy. · Recommendations/Intent for next treatment session:  Continue with aquatic therapy.      Future Appointments  Date Time Provider Jonas Denton   12/13/2017 2:30 PM Deirdre SAINT-DIÉ, PT JAQUELIN [facility]   12/20/2017 1:45 PM Deirdre SAINT-DIÉ, PT JAQUELIN [facility]   12/27/2017 1:00 PM Dio Piper, JANELLE HAMMER [facility]   1/18/2018 9:20 AM PIE LAB PIE PIE   1/25/2018 2:30 PM MD LUCILA Martin   2/20/2018 8:10 AM PIE LAB PIE PIE   2/23/2018 3:30 PM MD LUCILA Martin       Total Treatment Duration: 45 min  PT Patient Time In/Time Out  Time In: 5259  Time Out: 0400    Jacob Liu PT

## 2017-12-07 ENCOUNTER — APPOINTMENT (OUTPATIENT)
Dept: PHYSICAL THERAPY | Age: 57
End: 2017-12-07
Payer: COMMERCIAL

## 2017-12-11 ENCOUNTER — APPOINTMENT (OUTPATIENT)
Dept: PHYSICAL THERAPY | Age: 57
End: 2017-12-11
Payer: COMMERCIAL

## 2017-12-12 NOTE — PROGRESS NOTES
Amarilis Omer  : 1960 [address] at [address], [address] [place] [address], [address] [place] [address]  [phone]   [phone]         OUTPATIENT PHYSICAL THERAPY:Daily Note 2017    ICD-10: Treatment Diagnosis: low back pain (M54.5)  Precautions/Allergies:   Review of patient's allergies indicates no known allergies. Fall Risk Score: 2 (? 5 = High Risk)  MD Orders: evaluate and treat MEDICAL/REFERRING DIAGNOSIS:  Low back pain [M54.5]   DATE OF ONSET: chronic   REFERRING PHYSICIAN: Raiza Suresh MD  RETURN PHYSICIAN APPOINTMENT: 10/9/17     INITIAL ASSESSMENT:  Mr. To Naranjo has attended 23 scheduled PT visits treating low back pain. Overall, he has made slow progress with mobility and strength, however, pain continues to be consistently moderate with occasional exacerbations of low back and left hip. Some symptoms suggested hip pathology as well. Pt states no changes with low back and hip pain over the last 6 weeks despite addition of aquatic therapy and mechanical traction. He enjoys therapy and feels temporary relief but no long term. Recent objective measures seem to confirm no significant clinical changes. He has been educated on body mechanics with house work activities and as shown independence with his HEP and reports compliance with performing at home. Due to left hip and low back pain during weightbearing activates, the ability to progress patient to more functional activities is limited. Pt will be discharged at this time to continue HEP on his own. Thank you for the referral,  Myriam Valera, PT          GOALS: (Goals have been discussed and agreed upon with patient.)  SHORT-TERM FUNCTIONAL GOALS:   1. Pt will be independent with HEP focusing on core stability and promoting good spinal alignment. (met)  2. Pt will report no symptoms of LE for 1-2 weeks demonstrating centralization of symptoms. (met)  3.  Pt will report pain level does not exceed 5/10 in a 1-2 week period of time to demonstrate pain management. (met)   4. Pt will report improved sleep with less disturbance from back pain. (not met)  DISCHARGE GOALS:   1. Pt will improve Oswestry score by at least 10 points. (not met)   2. Pt will demonstrate increased AROM of lumbar spine all planes ~ 25% or greater without report of pain to improve functional mobility. (not met)  3. Pt will be able to sustain regular exercise routine including aerobic exercise 3+ times per week without increased symptoms to encourage healthy lifestyle. (not met)   4. Pt will demonstrate increased B LE strength to at least 4+/5 B to aid with functional mobility. (not met)                 The information in this section was collected on 8/7/17 (except where otherwise noted). HISTORY:   History of Present Injury/Illness (Reason for Referral):  Pt reports several years low back pain with a severe episode after lifting heavy objects in 2013 causing left low back L LE pain. Pt states that he has been receiving chiropractic treatments since then for pain management however, pain continued to bother him especially during/after work shifts at [employer]. He states that he is on his feet on concrete all day long and recently back pain interfering with job activities. In 3 months ago, pt reports he called off work due to pain too severe to perform work activities. Went to MD, imaging showing disc protrusion at L3 and stenosis at L1-2,  L5-S1. Referred to neuro specialist. Epidural injection ordered (not scheduled yet). PT referral with Neuro follow up in 4 weeks. Present symptoms (on day of initial evaluation): constant low back aching, mid to low back.   Occasional numbness of B LE (R>L)      · Aggravating factors: standing on hard surfaces 5 min, walking, stairs ambulation, bending   · Relieving factors: recliner, pain medication     · Pain level: 6/10 presently, 9/10 worst, 5/10 best     Past Medical History/Comorbidities: Mr. Ish Duque  has a past medical history of Allergic rhinitis due to allergen; Arthritis; Colon polyps (8/12/2016); Deficiency, lipoprotein (8/12/2016); [facility] (8/12/2016); Encounter for long-term (current) use of medications (8/12/2016); HLD (hyperlipidemia) (8/12/2016); HTN (hypertension) (8/12/2016); Obesity (8/12/2016); Other abnormal glucose (8/12/2016); Sinusitis, acute maxillary (8/12/2016); and URI (upper respiratory infection) (8/12/2016). Mr. Ish Duque  has a past surgical history that includes tonsillectomy; adenoidectomy; and other surgical (2005). Social History/Living Environment:     lives with spouse   Prior Level of Function/Work/Activity:  Currently on disability   Dominant Side:         RIGHT  Other Clinical Tests:          MRI lumbar spine: (copied from radiology)   Disc desiccation and mild degenerative changes and facet arthropathy  throughout the lumbar spine. There is a significant central stenosis at L1-2;  the thecal sac measures 6 to 7 mm in AP diameter. At L4-3-4 there is a  broad-based left lateral disc protrusion impinging the enlarged L3 nerve root. Moderate foraminal stenoses at L5-S1. Previous Treatment Approaches:          Chiropractor   Current Medications:       Current Outpatient Prescriptions:     FLUoxetine (PROZAC) 20 mg tablet, Take 1 Tab by mouth daily. , Disp: 90 Tab, Rfl: 3    multivitamin (ONE A DAY) tablet, Take 1 Tab by mouth daily. , Disp: , Rfl:     Insulin Needles, Disposable, (NOVOFINE 32) 32 gauge x 1/4\" ndle, 1 misc daily. For Victoza pen, Disp: 100 Pen Needle, Rfl: 3    traMADol (ULTRAM) 50 mg tablet, Take 1 Tab by mouth every six (6) hours as needed for Pain. Max Daily Amount: 200 mg. Indications: Pain, Disp: 120 Tab, Rfl: 2    metoprolol succinate (TOPROL-XL) 100 mg tablet, Take 1 Tab by mouth daily. , Disp: 90 Tab, Rfl: 3    metroNIDAZOLE (METROGEL) 1 % topical gel, Apply 1 g to affected area daily.  Use a thin layer to affected areas after washing, Disp: 60 g, Rfl: 5    lansoprazole (PREVACID) 30 mg capsule, Take 1 Cap by mouth Daily (before breakfast). , Disp: 90 Cap, Rfl: 3    lisinopril-hydroCHLOROthiazide (PRINZIDE, ZESTORETIC) 20-12.5 mg per tablet, Take 1 Tab by mouth daily. , Disp: 90 Tab, Rfl: 3    simvastatin (ZOCOR) 40 mg tablet, Take 1 Tab by mouth daily. , Disp: 90 Tab, Rfl: 3    Liraglutide (VICTOZA) 0.6 mg/0.1 mL (18 mg/3 mL) sub-q pen, 19mg/3ml pen         1.8mg per day  Indications: type 2 diabetes mellitus, Disp: 9 Syringe, Rfl: 3    doxycycline (ADOXA) 100 mg tablet, Take 1 Tab by mouth two (2) times a day. Indications: ACNE ROSACEA, Disp: 100 Tab, Rfl: 2    PEN NEEDLE, DIABETIC (NOVOFINE 32), 32 g by Does Not Apply route daily.  Indications: 32G x 6 MM,, Disp: , Rfl:       gabapentin 300 mg x 3    Date Last Reviewed:  11/17/2017     EXAMINATION: 11/17/17   Observation/Orthostatic Postural Assessment:          Standing:  · Left scap elevated   · B hips in slight flexion   · Decreased lumbar lordotic curve  · Left lumbar shift   Palpation:         Tender of mid lumbar region   ROM:            Lumbar spine Date:  8/9/17 Date:  10/11/17 Date:  12/1/17   Direction  Parameters Parameters Parameters   Flexion  25% cs 75% 75%   Extension  25% 75% 75%   Rotation  R: 25%  L: 15% R: 50%  L: 25% R: 50%  L: 50%    Side bending  R: 50%  L: 25% cs  R: 50%  L: 50% R: 50%  L: 50    Hip IR Very limited B  Mild-mod limitation  Mild-mod limitation    Hip ER Mild limitation B cs Mild limitation cs Mild limitation cs   Hip flexion  R: 90 degrees  L: 100 degrees cs R: 90 deg  L: 100 deg cs R: 90 deg  L: 100 deg cs   Cs= comparable sign (symptoms provoked)    Strength:            Lower quadrant    DATE  8/9/17 DATE  12/1/17   Hip flexion R: 4-  L: 4- R: 4 +  L: 4 cs   Hip Abduction  R: 4-  L: 4- R: 4  L: 4   Hip Extension  R: --  L: -- R: --  L: --   Knee Flexion  R: 4  L: 4- R: 4+  L: 4+   Knee Extension  R: 4+  L: 4- R: 4+  L:  4+ Ankle Dorsiflexion  R: 4   L: 4 R: 4+  L: 4+   Ankle Plantarflexion  R: 4  L:4  R: 4+  L: 4+          Special Tests:          None tested   Neurological Screen:        Myotomes: intact         Sensation: intact B LE    Functional Mobility:         Gait/Ambulation:  WBOS, ER B hips, increased lateral sway, reduces significantly with verbal cuing each visit- no carry over          Transfers:  Able to sit to stand 5/5 times without use of UE         Stairs:  Not tested

## 2017-12-13 ENCOUNTER — HOSPITAL ENCOUNTER (OUTPATIENT)
Dept: PHYSICAL THERAPY | Age: 57
Discharge: HOME | End: 2017-12-13
Payer: COMMERCIAL

## 2017-12-13 PROCEDURE — 97113 AQUATIC THERAPY/EXERCISES: CPT

## 2017-12-13 NOTE — PROGRESS NOTES
Genora Quivers  : 1960 [address] [facility] [place] [address] [address] [place] [address] [address] [place] [address], [facility], [address] [place] [address]  [phone]   [phone]         OUTPATIENT PHYSICAL THERAPY:Daily Note 2017    ICD-10: Treatment Diagnosis: low back pain (M54.5)  Precautions/Allergies:   Review of patient's allergies indicates no known allergies. Fall Risk Score: 2 (? 5 = High Risk)  MD Orders: evaluate and treat MEDICAL/REFERRING DIAGNOSIS:  Low back pain [M54.5]   DATE OF ONSET: chronic   REFERRING PHYSICIAN: Danielle Hidalgo MD  RETURN PHYSICIAN APPOINTMENT: 10/9/17     INITIAL ASSESSMENT:  Mr. Aminata Rodriguez has attended 13 scheduled PT visits treating low back pain. Overall, he has made slow progress with mobility and strength, however, pain continues to be consistently moderate with occasional exacerbations of low back and left hip. Some symptoms suggested hip pathology as well. He has been educated on body mechanics with house work activities and as shown independence with his HEP and reports compliance with performing at home. Due to left hip and low back pain during weightbearing activates, the ability to progress patient to more functional activities is limited. He would benefit from continued skilled PT 3 x per week to continued working toward d/c goals and return to work, with the addition of aquatic therapy as one of those 3 sessions each week to work on more functional core and hip strength/educrance until able to perform on land. PROBLEM LIST (Impacting functional limitations):  1. Decreased Strength  2. Decreased ADL/Functional Activities  3. Increased Pain  4. Decreased Flexibility/Joint Mobility  5. Decreased [place] with Home Exercise Program INTERVENTIONS PLANNED:  1. Cold  2. Heat  3. Home Exercise Program (HEP)  4. Manual Therapy  5. Neuromuscular Re-education/Strengthening  6. Range of Motion (ROM)  7. Therapeutic Exercise/Strengthening   8.  Aquatic therapy TREATMENT PLAN:  Effective Dates: 10/9/17 TO 12/9/17. Frequency/Duration: 3 times a week for 6 weeks  GOALS: (Goals have been discussed and agreed upon with patient.)  SHORT-TERM FUNCTIONAL GOALS: Time Frame: 2-3 weeks   1. Pt will be independent with HEP focusing on core stability and promoting good spinal alignment. (met)  2. Pt will report no symptoms of LE for 1-2 weeks demonstrating centralization of symptoms. (met)  3. Pt will report pain level does not exceed 5/10 in a 1-2 week period of time to demonstrate pain management. (ongoing)   4. Pt will report improved sleep with less disturbance from back pain. (not met)  DISCHARGE GOALS: Time Frame: 4-6 weeks   1. Pt will improve Oswestry score by at least 10 points. (ongoing)   2. Pt will demonstrate increased AROM of lumbar spine all planes ~ 25% or greater without report of pain to improve functional mobility. (not met)  3. Pt will be able to sustain regular exercise routine including aerobic exercise 3+ times per week without increased symptoms to encourage healthy lifestyle. (not met)   4. Pt will demonstrate increased B LE strength to at least 4+/5 B to aid with functional mobility. (not met)     Rehabilitation Potential For Stated Goals: Good                The information in this section was collected on 8/7/17 (except where otherwise noted). HISTORY:   History of Present Injury/Illness (Reason for Referral):  Pt reports several years low back pain with a severe episode after lifting heavy objects in 2013 causing left low back L LE pain. Pt states that he has been receiving chiropractic treatments since then for pain management however, pain continued to bother him especially during/after work shifts at [employer]. He states that he is on his feet on concrete all day long and recently back pain interfering with job activities. In 3 months ago, pt reports he called off work due to pain too severe to perform work activities.  Went to MD, imaging showing disc protrusion at L3 and stenosis at L1-2,  L5-S1. Referred to neuro specialist. Epidural injection ordered (not scheduled yet). PT referral with Neuro follow up in 4 weeks. Present symptoms (on day of initial evaluation): constant low back aching, mid to low back. Occasional numbness of B LE (R>L)      · Aggravating factors: standing on hard surfaces 5 min, walking, stairs ambulation, bending   · Relieving factors: recliner, pain medication     · Pain level: 6/10 presently, 9/10 worst, 5/10 best     Past Medical History/Comorbidities: Mr. Julia Pantoja  has a past medical history of Allergic rhinitis due to allergen; Arthritis; Colon polyps (8/12/2016); Deficiency, lipoprotein (8/12/2016); [facility] (8/12/2016); Encounter for long-term (current) use of medications (8/12/2016); HLD (hyperlipidemia) (8/12/2016); HTN (hypertension) (8/12/2016); Obesity (8/12/2016); Other abnormal glucose (8/12/2016); Sinusitis, acute maxillary (8/12/2016); and URI (upper respiratory infection) (8/12/2016). Mr. Julia Pantoja  has a past surgical history that includes tonsillectomy; adenoidectomy; and other surgical (2005). Social History/Living Environment:     lives with spouse   Prior Level of Function/Work/Activity:  Currently on disability   Dominant Side:         RIGHT  Other Clinical Tests:          MRI lumbar spine: (copied from radiology)   Disc desiccation and mild degenerative changes and facet arthropathy  throughout the lumbar spine. There is a significant central stenosis at L1-2;  the thecal sac measures 6 to 7 mm in AP diameter. At L4-3-4 there is a  broad-based left lateral disc protrusion impinging the enlarged L3 nerve root. Moderate foraminal stenoses at L5-S1. Previous Treatment Approaches:          Chiropractor   Current Medications:       Current Outpatient Prescriptions:     FLUoxetine (PROZAC) 20 mg tablet, Take 1 Tab by mouth daily. , Disp: 90 Tab, Rfl: 3    multivitamin (ONE A DAY) tablet, Take 1 Tab by mouth daily. , Disp: , Rfl:     Insulin Needles, Disposable, (NOVOFINE 32) 32 gauge x 1/4\" ndle, 1 misc daily. For Victoza pen, Disp: 100 Pen Needle, Rfl: 3    traMADol (ULTRAM) 50 mg tablet, Take 1 Tab by mouth every six (6) hours as needed for Pain. Max Daily Amount: 200 mg. Indications: Pain, Disp: 120 Tab, Rfl: 2    metoprolol succinate (TOPROL-XL) 100 mg tablet, Take 1 Tab by mouth daily. , Disp: 90 Tab, Rfl: 3    metroNIDAZOLE (METROGEL) 1 % topical gel, Apply 1 g to affected area daily. Use a thin layer to affected areas after washing, Disp: 60 g, Rfl: 5    lansoprazole (PREVACID) 30 mg capsule, Take 1 Cap by mouth Daily (before breakfast). , Disp: 90 Cap, Rfl: 3    lisinopril-hydroCHLOROthiazide (PRINZIDE, ZESTORETIC) 20-12.5 mg per tablet, Take 1 Tab by mouth daily. , Disp: 90 Tab, Rfl: 3    simvastatin (ZOCOR) 40 mg tablet, Take 1 Tab by mouth daily. , Disp: 90 Tab, Rfl: 3    Liraglutide (VICTOZA) 0.6 mg/0.1 mL (18 mg/3 mL) sub-q pen, 19mg/3ml pen         1.8mg per day  Indications: type 2 diabetes mellitus, Disp: 9 Syringe, Rfl: 3    doxycycline (ADOXA) 100 mg tablet, Take 1 Tab by mouth two (2) times a day. Indications: ACNE ROSACEA, Disp: 100 Tab, Rfl: 2    PEN NEEDLE, DIABETIC (NOVOFINE 32), 32 g by Does Not Apply route daily.  Indications: 32G x 6 MM,, Disp: , Rfl:       gabapentin 300 mg x 3    Date Last Reviewed:  12/13/2017     EXAMINATION: 10/11/17   Observation/Orthostatic Postural Assessment:          Standing:  · Left scap elevated   · B hips in slight flexion   · Decreased lumbar lordotic curve  · Left lumbar shift   Palpation:         Tender of mid lumbar region   ROM:            Lumbar spine Date:  8/9/17 Date:  10/11/17 Date:     Direction  Parameters Parameters Parameters   Flexion  25% cs 75%    Extension  25% 75%    Rotation  R: 25%  L: 15% R: 50%  L: 25%    Side bending  R: 50%  L: 25% cs  R: 50%  L: 50%    Hip IR Very limited B  Mild-mod limitation     Hip ER Mild limitation B cs Mild limitation cs    Hip flexion  R: 90 degrees  L: 100 degrees cs R: 90 deg  L: 100 deg cs    Cs= comparable sign (symptoms provoked)    Strength:            Lower quadrant    DATE  8/9/17 DATE  10/11/17   Hip flexion R: 4-  L: 4- R: 4 +  L: 4 cs   Hip Abduction  R: 4-  L: 4- R: 4  L: 4   Hip Extension  R: --  L: -- R: --  L: --   Knee Flexion  R: 4  L: 4- R: 4+  L: 4+   Knee Extension  R: 4+  L: 4- R: 4+  L:  4+   Ankle Dorsiflexion  R: 4   L: 4 R: 4+  L: 4+   Ankle Plantarflexion  R: 4  L:4  R: 4+  L: 4+          Special Tests:          None tested   Neurological Screen:        Myotomes: intact         Sensation: intact B LE    Functional Mobility:         Gait/Ambulation:  WBOS, ER B hips, increased lateral sway, reduces significantly with verbal cuing each visit- no carry over          Transfers:  Able to sit to stand 5/5 times without use of UE         Stairs:  Not tested    CLINICAL DECISION MAKING:   Outcome Measure: Tool Used: Modified Oswestry Low Back Pain Questionnaire  Score:  Initial: 38/50  Most Recent: 37/50 (Date: 10/11/17 )   Interpretation of Score: Each section is scored on a 0-5 scale, 5 representing the greatest disability. The scores of each section are added together for a total score of 50. Medical Necessity:   · Patient is expected to demonstrate progress in strength and range of motion to decrease assistance required with ADLs as well as work related activities. .  Reason for Services/Other Comments:  · Patient continues to require modification of therapeutic interventions to increase complexity of exercises. TREATMENT:   (In addition to Assessment/Re-Assessment sessions the following treatments were rendered)  Pre-treatment Symptoms/Complaints: Pt states he has been hurting more. He thinks it may be because he is not attending land therapy any more but he has had more pain this week.   Pain: Initial:    7/10  Post Session:  4/10      Aquatic Therapy (45 minutes): Aquatic treatment performed per flow grid for Decreased static/dynamic balance and reactive control, Decreased range of motion, Decreased activity endurance, Decompression, Ease of movement and Low impact and reduced weight bearing activity. Cues provided for gait, posture and ex. Aquatic Exercise Log       Date  11/22 Date  12/6 Date  12/13 Date   Date     Activity/ Exercise Parameters Parameters Parameters Parameters Parameters   Walking forward 6 6 6     Walking backward 6 6 6     Walking sideways 6 6 6       Marching 6 6 6       Goose Step 6 6 6       Tip toes 3 3 3       Heels 3 3 3       Lunges        Side step squats        LE Exercises 4# 4# 4#       Hip Flex/Ext 12 12 12       Hip Abd/Add 12 12 12       Hip IR/ER          Calf raises 15 12 12       Knee Flex 15 12 12       Squats          Leg Circles 10/10 10/10 10/10       Step Ups 10 10 10     UE Exercises          Squeeze In          Push Down          Pull Down          Bicep/Tricep        Rows/Press outs         Chi Positions          Trunk Rotation        Deep H2O/ Noodles 7' with 4# 7' with 4# 7' with 4#       Stabilization          Arms only          Legs only Jog 2 min Jog 2 min Jog 2 min     Cross   Country 2 min 2 min 2 min       Scissors 2 min 2 min 2 min       Crab walk        Lower abdominal   work           Cardio          Jogging        Lap   Swimming          Stretches          Hamstrings          Heelcords          Piriformis          Neck                     Treatment/Session Assessment:     · Response to Treatment: Did well with aquatic therapy. · Recommendations/Intent for next treatment session:  Continue with aquatic therapy.      Future Appointments  Date Time Provider Jonas Denton   12/20/2017 1:45 PM Deirdre SAINT-DIÉ, PT [facility]   12/27/2017 1:00 PM JANELLE Carlisle [facility]   1/18/2018 9:20 AM [facility] P[facility] PIE   1/25/2018 2:30 PM MD LUCILA Jordan   2/20/2018 8:10 AM PIE LAB PIE PIE   2/23/2018 3:30 PM MD LUCILA Man       Total Treatment Duration: 45 min  PT Patient Time In/Time Out  Time In: 0230  Time Out: 6924    Jazzy Taylor PT

## 2017-12-15 ENCOUNTER — APPOINTMENT (OUTPATIENT)
Dept: PHYSICAL THERAPY | Age: 57
End: 2017-12-15
Payer: COMMERCIAL

## 2017-12-18 ENCOUNTER — HOSPITAL ENCOUNTER (OUTPATIENT)
Dept: PHYSICAL THERAPY | Age: 57
End: 2017-12-18
Payer: COMMERCIAL

## 2017-12-20 ENCOUNTER — HOSPITAL ENCOUNTER (OUTPATIENT)
Dept: PHYSICAL THERAPY | Age: 57
Discharge: HOME | End: 2017-12-20
Payer: COMMERCIAL

## 2017-12-22 ENCOUNTER — APPOINTMENT (OUTPATIENT)
Dept: PHYSICAL THERAPY | Age: 57
End: 2017-12-22
Payer: COMMERCIAL

## 2018-02-13 ENCOUNTER — HOSPITAL ENCOUNTER (OUTPATIENT)
Dept: PHYSICAL THERAPY | Age: 58
End: 2018-02-13
Payer: COMMERCIAL

## 2018-02-15 ENCOUNTER — HOSPITAL ENCOUNTER (OUTPATIENT)
Dept: PHYSICAL THERAPY | Age: 58
Discharge: HOME | End: 2018-02-15
Payer: COMMERCIAL

## 2018-02-15 PROCEDURE — 97110 THERAPEUTIC EXERCISES: CPT

## 2018-02-15 PROCEDURE — 97162 PT EVAL MOD COMPLEX 30 MIN: CPT

## 2018-02-15 PROCEDURE — G8979 MOBILITY GOAL STATUS: HCPCS

## 2018-02-15 PROCEDURE — G8978 MOBILITY CURRENT STATUS: HCPCS

## 2018-02-20 ENCOUNTER — HOSPITAL ENCOUNTER (OUTPATIENT)
Dept: PHYSICAL THERAPY | Age: 58
Discharge: HOME | End: 2018-02-20
Payer: COMMERCIAL

## 2018-02-20 PROCEDURE — 97110 THERAPEUTIC EXERCISES: CPT

## 2018-02-22 NOTE — PROGRESS NOTES
Nikolas Rudd  : 1960  Primary: Patricia Lima Of Pako Meredith*  Secondary: [place] [id number] [facility] at [facility] CHRISTIANO CABRERA  [address] [facility], [address] [place] [address],[address], [facility] [address]  [phone]   [phone]       OUTPATIENT PHYSICAL THERAPY:Daily Note and aquatic 2018    ICD-10: Treatment Diagnosis: low back pain (M54.5)  Precautions/Allergies:   Review of patient's allergies indicates no known allergies. Fall Risk Score: 1 (? 5 = High Risk)  MD Orders: Aquatic therapy Evaluate and treat. MEDICAL/REFERRING DIAGNOSIS:  Low back pain [M54.5] DDD, stenosis  DATE OF ONSET: back problems most of his life  REFERRING PHYSICIAN: Rangel Hogan MD  RETURN PHYSICIAN APPOINTMENT:       INITIAL ASSESSMENT:  Mr. Marianna Schlatter presents with chronic back pain. Pt has done the pool in the past with good results and would like to do the pool again. Pt is good candidate for PT to address problems below. PROBLEM LIST (Impacting functional limitations):  1. Decreased Strength  2. Decreased Ambulation Ability/Technique  3. Increased Pain  4. Decreased Flexibility/Joint Mobility INTERVENTIONS PLANNED:  1. Home Exercise Program (HEP)  2. Manual Therapy  3. Therapeutic Exercise/Strengthening  4. aquatic therapy   TREATMENT PLAN:  Effective Dates: 2/15/18 TO 5/15/18. Frequency/Duration: 2 times a week for 8 weeks  GOALS: (Goals have been discussed and agreed upon with patient.)  Short-Term Functional Goals: Time Frame: 4 weeks  1. Pt will participate in 45 minute aquatic therapy 2/week consistently. 2. Pt will report consistency with HEP  3. Pt will report improved function and / or decrease in symptoms/pain. Discharge Goals: Time Frame: 8 weeks  1. Improved Trunk ROM and improved hip ROM for joint health and ease of movement. 2. Improve Oswestry by 10 points or greater indicating improved function  3.  Pt independent with HEP and/or aquatic program to maintain gains made in PT.  Rehabilitation Potential For Stated Goals: Deborah Garcia's therapy, I certify that the treatment plan above will be carried out by a therapist or under their direction. Thank you for this referral,  Chritso Ayala, PT     Referring Physician Signature: Abby Viveros MD              Date                    The information in this section was collected on 2/15/18 (except where otherwise noted). HISTORY:   History of Present Injury/Illness (Reason for Referral):  Pt states he has chronic low back pain and he has radiating pain with standing into B legs to above knee level. He reports both pain and numbness. Standing and walking , bending , stooping increase pain. Sitting decreases pain but depends on support of chair. Pt spends several hour a day in recliner. Past Medical History/Comorbidities:   Mr. India Marcelo  has a past medical history of Allergic rhinitis due to allergen; Arthritis; Colon polyps (8/12/2016); Deficiency, lipoprotein (8/12/2016); [facility] (8/12/2016); Encounter for long-term (current) use of medications (8/12/2016); HLD (hyperlipidemia) (8/12/2016); HTN (hypertension) (8/12/2016); Obesity (8/12/2016); Other abnormal glucose (8/12/2016); Sinusitis, acute maxillary (8/12/2016); and URI (upper respiratory infection) (8/12/2016). Mr. India Marcelo  has a past surgical history that includes hx tonsillectomy; hx adenoidectomy; and hx other surgical (2005). 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   Previous Treatment Approaches:          Pt had PT with some aquatic therapy in the past.  Personal Factors:          Profession:  Not working since May 201        Overall Behavior:  Focused on pain and symptoms        Other factors that influence how disability is experienced by the patient:  Obesity, sedentary over past several months  Current Medications:       Current Outpatient Prescriptions:     traMADol (ULTRAM) 50 mg tablet, Take 1 Tab by mouth every six (6) hours as needed for Pain. Max Daily Amount: 200 mg. Indications: Pain, Disp: 120 Tab, Rfl: 2    metroNIDAZOLE (METROGEL) 1 % topical gel, Apply 1 g to affected area daily. Use a thin layer to affected areas after washing, Disp: 60 g, Rfl: 5    lansoprazole (PREVACID) 30 mg capsule, Take 1 Cap by mouth Daily (before breakfast). , Disp: 90 Cap, Rfl: 3    lisinopril-hydroCHLOROthiazide (PRINZIDE, ZESTORETIC) 20-12.5 mg per tablet, Take 1 Tab by mouth daily. , Disp: 90 Tab, Rfl: 3    simvastatin (ZOCOR) 40 mg tablet, Take 1 Tab by mouth daily. , Disp: 90 Tab, Rfl: 3    Liraglutide (VICTOZA) 0.6 mg/0.1 mL (18 mg/3 mL) pnij, 1.8 mg by SubCUTAneous route daily. Indications: type 2 diabetes mellitus, Disp: 3 Pen, Rfl: 11    doxycycline (ADOXA) 100 mg tablet, Take 1 Tab by mouth two (2) times a day. Indications: ACNE ROSACEA, Disp: 100 Tab, Rfl: 2    metoprolol succinate (TOPROL-XL) 200 mg XL tablet, Take 1 Tab by mouth daily. , Disp: 90 Tab, Rfl: 3    FLUoxetine (PROZAC) 20 mg tablet, Take 1 Tab by mouth daily. , Disp: 90 Tab, Rfl: 3    multivitamin (ONE A DAY) tablet, Take 1 Tab by mouth daily. , Disp: , Rfl:     Insulin Needles, Disposable, (NOVOFINE 32) 32 gauge x 1/4\" ndle, 1 misc daily. For Victoza pen, Disp: 100 Pen Needle, Rfl: 3    PEN NEEDLE, DIABETIC (NOVOFINE 32), 32 g by Does Not Apply route daily. Indications: 32G x 6 MM,, Disp: , Rfl:    Date Last Reviewed:  2/22/2018   Number of Personal Factors/Comorbidities that affect the Plan of Care: 3+: HIGH COMPLEXITY   EXAMINATION:   Observation/Orthostatic Postural Assessment:          Pt is obese with poor sitting posture. Pt reports increase in symptoms with corrected sitting posture.   ROM:          Trunk flex- finger tips to knee level - increases pain                 Ext- moderate limitation- painful to do but feels better after                 Side glide- mod limitation no increase pain. Hip flexion - 90 degrees B  Strength:   Grossly WFL LE. Body Structures Involved:  1. Joints  2. Muscles Body Functions Affected:  1. Sensory/Pain  2. Neuromusculoskeletal  3. Movement Related Activities and Participation Affected:  1. Mobility   Number of elements (examined above) that affect the Plan of Care: 4+: HIGH COMPLEXITY   CLINICAL PRESENTATION:   Presentation: Evolving clinical presentation with changing clinical characteristics: MODERATE COMPLEXITY   CLINICAL DECISION MAKING:   Outcome Measure: Tool Used: Modified Oswestry Low Back Pain Questionnaire  Score:  Initial: 36/50  Most Recent: X/50 (Date: -- )   Interpretation of Score: Each section is scored on a 0-5 scale, 5 representing the greatest disability. The scores of each section are added together for a total score of 50. Score 0 1-10 11-20 21-30 31-40 41-49 50   Modifier CH CI CJ CK CL CM CN     ? Mobility - Walking and Moving Around:     - CURRENT STATUS: CL - 60%-79% impaired, limited or restricted    - GOAL STATUS: CK - 40%-59% impaired, limited or restricted    - D/C STATUS:  ---------------To be determined---------------    Medical Necessity:   · Patient is expected to demonstrate progress in strength and range of motion to increase mobility and decrease pain and stiffness. .  Reason for Services/Other Comments:  · Pt has improved in the past with aquatic therapy and should be able to become independent after gains are made with skilled treatment. .   Use of outcome tool(s) and clinical judgement create a POC that gives a: Questionable prediction of patient's progress: MODERATE COMPLEXITY            TREATMENT:   (In addition to Assessment/Re-Assessment sessions the following treatments were rendered)  Pre-treatment Symptoms/Complaints:  Pt states he did not do exercises every 2-3 hours.   He stated he did them 2-3 times per day the first day and none the next as he was sore. Pain: Initial:     7/10 Post Session:  5/10   Therapeutic Exercise: ( ):  Exercises per grid below to improve mobility. Required moderate visual and verbal cues to promote proper body alignment, promote proper body posture and promote proper body mechanics. Progressed range and repetitions as indicated. Date:  2/15 Date:  220 Date:     Activity/Exercise Parameters Parameters Parameters   Standing trunk ex 3 x 10 3 x 10    Prone propping Used table to elevate upper body   5 min Used table  5 min    pressups 5x 2 x 5    Trial of flexion- supine DKTC  Requires assist  5x    Seated flexion  Very limited ROM 5x                    Fluential Portal  Treatment/Session Assessment:  Pt had increased pain with flexion- more support for extension ex. By end of session any motion was painful to pt. Difficult for pt to describe pain as he hurts all the time. · Response to Treatment: Pt had decreased pain during session with prone press ups. Difficult for pt to do prone press ups at home due to strength and endurance. · Compliance with Program/Exercises:  Somewhat compliant  · Recommendations/Intent for next treatment session: \"Next visit will focus on mobility, Mimi ex and aquatic. \".   Total Treatment Duration: 45 min  PT Patient Time In/Time Out  Time In: 1015  Time Out: Jer Che 18, PT

## 2018-03-01 ENCOUNTER — APPOINTMENT (OUTPATIENT)
Dept: PHYSICAL THERAPY | Age: 58
End: 2018-03-01
Payer: COMMERCIAL

## 2018-03-02 ENCOUNTER — HOSPITAL ENCOUNTER (OUTPATIENT)
Dept: PHYSICAL THERAPY | Age: 58
Discharge: HOME | End: 2018-03-02
Payer: COMMERCIAL

## 2018-03-02 PROCEDURE — 97113 AQUATIC THERAPY/EXERCISES: CPT

## 2018-03-02 NOTE — PROGRESS NOTES
Yessenia Benz  : 1960  Primary: Jennitoni Amanda Of Pako Meredith*  Secondary: [place] [id number] [facility] CHRISTIANO CABRERA  [address] [place] [address], [address], [place] [address].  [phone]   [phone]       OUTPATIENT PHYSICAL THERAPY:Daily Note and Aquatic Note  3/2/2018    ICD-10: Treatment Diagnosis: low back pain (M54.5)  Precautions/Allergies:   Review of patient's allergies indicates no known allergies. Fall Risk Score: 1 (? 5 = High Risk)  MD Orders: Aquatic therapy Evaluate and treat. MEDICAL/REFERRING DIAGNOSIS:  Low back pain [M54.5] DDD, stenosis  DATE OF ONSET: back problems most of his life  REFERRING PHYSICIAN: Golden Galicia MD  RETURN PHYSICIAN APPOINTMENT:       INITIAL ASSESSMENT:  Mr. Gogo Johnson presents with chronic back pain. Pt has done the pool in the past with good results and would like to do the pool again. Pt is good candidate for PT to address problems below. PROBLEM LIST (Impacting functional limitations):  1. Decreased Strength  2. Decreased Ambulation Ability/Technique  3. Increased Pain  4. Decreased Flexibility/Joint Mobility INTERVENTIONS PLANNED:  1. Home Exercise Program (HEP)  2. Manual Therapy  3. Therapeutic Exercise/Strengthening  4. aquatic therapy   TREATMENT PLAN:  Effective Dates: 2/15/18 TO 5/15/18. Frequency/Duration: 2 times a week for 8 weeks  GOALS: (Goals have been discussed and agreed upon with patient.)  Short-Term Functional Goals: Time Frame: 4 weeks  1. Pt will participate in 45 minute aquatic therapy 2/week consistently. 2. Pt will report consistency with HEP  3. Pt will report improved function and / or decrease in symptoms/pain. Discharge Goals: Time Frame: 8 weeks  1. Improved Trunk ROM and improved hip ROM for joint health and ease of movement. 2. Improve Oswestry by 10 points or greater indicating improved function  3.  Pt independent with HEP and/or aquatic program to maintain gains made in PT.  Rehabilitation Potential For Stated Goals: Deborah Rajputjuancarlosfox Hammond's therapy, I certify that the treatment plan above will be carried out by a therapist or under their direction. Thank you for this referral,  Theodyaritza Friend, PT     Referring Physician Signature: Clem Zelaya MD              Date                    The information in this section was collected on 2/15/18 (except where otherwise noted). HISTORY:   History of Present Injury/Illness (Reason for Referral):  Pt states he has chronic low back pain and he has radiating pain with standing into B legs to above knee level. He reports both pain and numbness. Standing and walking , bending , stooping increase pain. Sitting decreases pain but depends on support of chair. Pt spends several hour a day in recliner. Past Medical History/Comorbidities:   Mr. Betty Kunz  has a past medical history of Allergic rhinitis due to allergen; Arthritis; Colon polyps (8/12/2016); Deficiency, lipoprotein (8/12/2016); Diabetes mellitus type 2, [facility] ([facility] (8/12/2016); Encounter for long-term (current) use of medications (8/12/2016); HLD (hyperlipidemia) (8/12/2016); HTN (hypertension) (8/12/2016); Obesity (8/12/2016); Other abnormal glucose (8/12/2016); Sinusitis, acute maxillary (8/12/2016); and URI (upper respiratory infection) (8/12/2016). Mr. Betty Kunz  has a past surgical history that includes hx tonsillectomy; hx adenoidectomy; and hx other surgical (2005). 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   Previous Treatment Approaches:          Pt had PT with some aquatic therapy in the past.  Personal Factors:          Profession:  Not working since May 201        Overall Behavior:  Focused on pain and symptoms        Other factors that influence how disability is experienced by the patient:  Obesity, sedentary over past several months  Current Medications:       Current Outpatient Prescriptions:     traMADol (ULTRAM) 50 mg tablet, Take 1 Tab by mouth every six (6) hours as needed for Pain. Max Daily Amount: 200 mg. Indications: Pain, Disp: 120 Tab, Rfl: 2    metroNIDAZOLE (METROGEL) 1 % topical gel, Apply 1 g to affected area daily. Use a thin layer to affected areas after washing, Disp: 60 g, Rfl: 5    lansoprazole (PREVACID) 30 mg capsule, Take 1 Cap by mouth Daily (before breakfast). , Disp: 90 Cap, Rfl: 3    lisinopril-hydroCHLOROthiazide (PRINZIDE, ZESTORETIC) 20-12.5 mg per tablet, Take 1 Tab by mouth daily. , Disp: 90 Tab, Rfl: 3    simvastatin (ZOCOR) 40 mg tablet, Take 1 Tab by mouth daily. , Disp: 90 Tab, Rfl: 3    Liraglutide (VICTOZA) 0.6 mg/0.1 mL (18 mg/3 mL) pnij, 1.8 mg by SubCUTAneous route daily. Indications: type 2 diabetes mellitus, Disp: 3 Pen, Rfl: 11    doxycycline (ADOXA) 100 mg tablet, Take 1 Tab by mouth two (2) times a day. Indications: ACNE ROSACEA, Disp: 100 Tab, Rfl: 2    metoprolol succinate (TOPROL-XL) 200 mg XL tablet, Take 1 Tab by mouth daily. , Disp: 90 Tab, Rfl: 3    FLUoxetine (PROZAC) 20 mg tablet, Take 1 Tab by mouth daily. , Disp: 90 Tab, Rfl: 3    multivitamin (ONE A DAY) tablet, Take 1 Tab by mouth daily. , Disp: , Rfl:     Insulin Needles, Disposable, (NOVOFINE 32) 32 gauge x 1/4\" ndle, 1 misc daily. For Victoza pen, Disp: 100 Pen Needle, Rfl: 3    PEN NEEDLE, DIABETIC (NOVOFINE 32), 32 g by Does Not Apply route daily. Indications: 32G x 6 MM,, Disp: , Rfl:    Date Last Reviewed:  3/2/2018   Number of Personal Factors/Comorbidities that affect the Plan of Care: 3+: HIGH COMPLEXITY   EXAMINATION:   Observation/Orthostatic Postural Assessment:          Pt is obese with poor sitting posture. Pt reports increase in symptoms with corrected sitting posture.   ROM:          Trunk flex- finger tips to knee level - increases pain                 Ext- moderate limitation- painful to do but feels better after                 Side glide- mod limitation no increase pain. Hip flexion - 90 degrees B  Strength:   Grossly WFL LE. Body Structures Involved:  1. Joints  2. Muscles Body Functions Affected:  1. Sensory/Pain  2. Neuromusculoskeletal  3. Movement Related Activities and Participation Affected:  1. Mobility   Number of elements (examined above) that affect the Plan of Care: 4+: HIGH COMPLEXITY   CLINICAL PRESENTATION:   Presentation: Evolving clinical presentation with changing clinical characteristics: MODERATE COMPLEXITY   CLINICAL DECISION MAKING:   Outcome Measure: Tool Used: Modified Oswestry Low Back Pain Questionnaire  Score:  Initial: 36/50  Most Recent: X/50 (Date: -- )   Interpretation of Score: Each section is scored on a 0-5 scale, 5 representing the greatest disability. The scores of each section are added together for a total score of 50. Score 0 1-10 11-20 21-30 31-40 41-49 50   Modifier CH CI CJ CK CL CM CN     ? Mobility - Walking and Moving Around:     - CURRENT STATUS: CL - 60%-79% impaired, limited or restricted    - GOAL STATUS: CK - 40%-59% impaired, limited or restricted    - D/C STATUS:  ---------------To be determined---------------    Medical Necessity:   · Patient is expected to demonstrate progress in strength and range of motion to increase mobility and decrease pain and stiffness. .  Reason for Services/Other Comments:  · Pt has improved in the past with aquatic therapy and should be able to become independent after gains are made with skilled treatment. .   Use of outcome tool(s) and clinical judgement create a POC that gives a: Questionable prediction of patient's progress: MODERATE COMPLEXITY            TREATMENT:   (In addition to Assessment/Re-Assessment sessions the following treatments were rendered)  Pre-treatment Symptoms/Complaints:  Pt states he has good days and bad days and is pretty good today.   He thinks the stretching is helping. Pain: Initial:     5/10 Post Session:  3/10     Aquatic Therapy (45 minutes): Aquatic treatment performed per flow grid for Decreased muscle strength, Decreased endurance and Decreased activity endurance. Cues provided for alignment and posture. Aquatic Exercise Log       Date  2-22-18 Date  3/2 Date   Date   Date     Activity/ Exercise Parameters Parameters Parameters Parameters Parameters   Walking forward 6  6      Walking backward 6 6      Walking sideways 6 6        Marching 6 6        Goose Step 6 6        Tip toes 6 6        Heels          Lunges        Side step squats        LE Exercises Noodle  4#        Hip Flex/Ext Marching x 12 B 15        Hip Abd/Add X 12 B 15        Hip IR/ER          Calf raises X 10 B 15        Knee Flex  15        Squats          Leg Circles Deeper water x 10 each way each leg 10/10        Step Ups X 10 B       UE Exercises          Squeeze In          Push Down          Pull Down          Bicep/Tricep        Rows/Press outs         Chi Positions          Trunk Rotation        Deep H2O/ Noodles Noodle  Noodle 4#        Stabilization Core stability          Arms only          Legs only Bicycle  Jog x 2 min      Cross   Country X 20  X 2 min        Scissors X 20  X 2min        Crab walk        Lower abdominal   work  Knees to chest x 20     T- hang in deep end - 3 min DKTC x 20        Cardio          Jogging        Lap   Swimming          Stretches          Trunk ext  2 x 10        Heelcords          Piriformis          Neck                      Treatment/Session Assessment:  Patient is reporting improvements with ex and aquatics. · Response to Treatment: Pt had decreased pain during session with prone press ups. Difficult for pt to do prone press ups at home due to strength and endurance. · Compliance with Program/Exercises:  Somewhat compliant  · Recommendations/Intent for next treatment session:  \"Next visit will focus on mobility, Mimi ex and aquatic. \".   Total Treatment Duration: 45 min       Brett Urbano, PT

## 2018-03-06 ENCOUNTER — HOSPITAL ENCOUNTER (OUTPATIENT)
Dept: PHYSICAL THERAPY | Age: 58
Discharge: HOME | End: 2018-03-06
Payer: COMMERCIAL

## 2018-03-06 PROCEDURE — 97110 THERAPEUTIC EXERCISES: CPT

## 2018-03-06 NOTE — PROGRESS NOTES
Catracho Stephens  : 1960  Primary: Nasir Bass Of [facility]*  Secondary: [address]  [address] [facility], [address], [place].  [phone]   [phone]       OUTPATIENT PHYSICAL THERAPY:Daily Note 3/6/2018    ICD-10: Treatment Diagnosis: low back pain (M54.5)  Precautions/Allergies:   Review of patient's allergies indicates no known allergies. Fall Risk Score: 1 (? 5 = High Risk)  MD Orders: Aquatic therapy Evaluate and treat. MEDICAL/REFERRING DIAGNOSIS:  Low back pain [M54.5] DDD, stenosis  DATE OF ONSET: back problems most of his life  REFERRING PHYSICIAN: Guera Baldwin MD  RETURN PHYSICIAN APPOINTMENT:       INITIAL ASSESSMENT:  Mr. Charanjit Thakkar presents with chronic back pain. Pt has done the pool in the past with good results and would like to do the pool again. Pt is good candidate for PT to address problems below. PROBLEM LIST (Impacting functional limitations):  1. Decreased Strength  2. Decreased Ambulation Ability/Technique  3. Increased Pain  4. Decreased Flexibility/Joint Mobility INTERVENTIONS PLANNED:  1. Home Exercise Program (HEP)  2. Manual Therapy  3. Therapeutic Exercise/Strengthening  4. aquatic therapy   TREATMENT PLAN:  Effective Dates: 2/15/18 TO 5/15/18. Frequency/Duration: 2 times a week for 8 weeks  GOALS: (Goals have been discussed and agreed upon with patient.)  Short-Term Functional Goals: Time Frame: 4 weeks  1. Pt will participate in 45 minute aquatic therapy 2/week consistently. 2. Pt will report consistency with HEP  3. Pt will report improved function and / or decrease in symptoms/pain. Discharge Goals: Time Frame: 8 weeks  1. Improved Trunk ROM and improved hip ROM for joint health and ease of movement. 2. Improve Oswestry by 10 points or greater indicating improved function  3.  Pt independent with HEP and/or aquatic program to maintain gains made in PT.  Rehabilitation Potential For Stated Goals: Deborah Rajputjuancarlosfox Hammond's therapy, I certify that the treatment plan above will be carried out by a therapist or under their direction. Thank you for this referral,  Brandon Quinn, PT     Referring Physician Signature: Danny Clark MD              Date                    The information in this section was collected on 2/15/18 (except where otherwise noted). HISTORY:   History of Present Injury/Illness (Reason for Referral):  Pt states he has chronic low back pain and he has radiating pain with standing into B legs to above knee level. He reports both pain and numbness. Standing and walking , bending , stooping increase pain. Sitting decreases pain but depends on support of chair. Pt spends several hour a day in recliner. Past Medical History/Comorbidities:   Mr. Janel Sanchez  has a past medical history of Allergic rhinitis due to allergen; Arthritis; Colon polyps (8/12/2016); Deficiency, lipoprotein (8/12/2016); Diabetes mellitus type 2, controlled ([facility] [address] (8/12/2016); Encounter for long-term (current) use of medications (8/12/2016); HLD (hyperlipidemia) (8/12/2016); HTN (hypertension) (8/12/2016); Obesity (8/12/2016); Other abnormal glucose (8/12/2016); Sinusitis, acute maxillary (8/12/2016); and URI (upper respiratory infection) (8/12/2016). Mr. Janel Sanchez  has a past surgical history that includes hx tonsillectomy; hx adenoidectomy; and hx other surgical (2005). 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   Previous Treatment Approaches:          Pt had PT with some aquatic therapy in the past.  Personal Factors:          Profession:  Not working since May 201        Overall Behavior:  Focused on pain and symptoms        Other factors that influence how disability is experienced by the patient:  Obesity, sedentary over past several months  Current Medications:       Current Outpatient Prescriptions:     traMADol (ULTRAM) 50 mg tablet, Take 1 Tab by mouth every six (6) hours as needed for Pain. Max Daily Amount: 200 mg. Indications: Pain, Disp: 120 Tab, Rfl: 2    metroNIDAZOLE (METROGEL) 1 % topical gel, Apply 1 g to affected area daily. Use a thin layer to affected areas after washing, Disp: 60 g, Rfl: 5    lansoprazole (PREVACID) 30 mg capsule, Take 1 Cap by mouth Daily (before breakfast). , Disp: 90 Cap, Rfl: 3    lisinopril-hydroCHLOROthiazide (PRINZIDE, ZESTORETIC) 20-12.5 mg per tablet, Take 1 Tab by mouth daily. , Disp: 90 Tab, Rfl: 3    simvastatin (ZOCOR) 40 mg tablet, Take 1 Tab by mouth daily. , Disp: 90 Tab, Rfl: 3    Liraglutide (VICTOZA) 0.6 mg/0.1 mL (18 mg/3 mL) pnij, 1.8 mg by SubCUTAneous route daily. Indications: type 2 diabetes mellitus, Disp: 3 Pen, Rfl: 11    doxycycline (ADOXA) 100 mg tablet, Take 1 Tab by mouth two (2) times a day. Indications: ACNE ROSACEA, Disp: 100 Tab, Rfl: 2    metoprolol succinate (TOPROL-XL) 200 mg XL tablet, Take 1 Tab by mouth daily. , Disp: 90 Tab, Rfl: 3    FLUoxetine (PROZAC) 20 mg tablet, Take 1 Tab by mouth daily. , Disp: 90 Tab, Rfl: 3    multivitamin (ONE A DAY) tablet, Take 1 Tab by mouth daily. , Disp: , Rfl:     Insulin Needles, Disposable, (NOVOFINE 32) 32 gauge x 1/4\" ndle, 1 misc daily. For Victoza pen, Disp: 100 Pen Needle, Rfl: 3    PEN NEEDLE, DIABETIC (NOVOFINE 32), 32 g by Does Not Apply route daily. Indications: 32G x 6 MM,, Disp: , Rfl:    Date Last Reviewed:  3/6/2018   Number of Personal Factors/Comorbidities that affect the Plan of Care: 3+: HIGH COMPLEXITY   EXAMINATION:   Observation/Orthostatic Postural Assessment:          Pt is obese with poor sitting posture. Pt reports increase in symptoms with corrected sitting posture.   ROM:          Trunk flex- finger tips to knee level - increases pain                 Ext- moderate limitation- painful to do but feels better after                 Side glide- mod limitation no increase pain. Hip flexion - 90 degrees B  Strength:   Grossly WFL LE. Body Structures Involved:  1. Joints  2. Muscles Body Functions Affected:  1. Sensory/Pain  2. Neuromusculoskeletal  3. Movement Related Activities and Participation Affected:  1. Mobility   Number of elements (examined above) that affect the Plan of Care: 4+: HIGH COMPLEXITY   CLINICAL PRESENTATION:   Presentation: Evolving clinical presentation with changing clinical characteristics: MODERATE COMPLEXITY   CLINICAL DECISION MAKING:   Outcome Measure: Tool Used: Modified Oswestry Low Back Pain Questionnaire  Score:  Initial: 36/50  Most Recent: X/50 (Date: -- )   Interpretation of Score: Each section is scored on a 0-5 scale, 5 representing the greatest disability. The scores of each section are added together for a total score of 50. Score 0 1-10 11-20 21-30 31-40 41-49 50   Modifier CH CI CJ CK CL CM CN     ? Mobility - Walking and Moving Around:     - CURRENT STATUS: CL - 60%-79% impaired, limited or restricted    - GOAL STATUS: CK - 40%-59% impaired, limited or restricted    - D/C STATUS:  ---------------To be determined---------------    Medical Necessity:   · Patient is expected to demonstrate progress in strength and range of motion to increase mobility and decrease pain and stiffness. .  Reason for Services/Other Comments:  · Pt has improved in the past with aquatic therapy and should be able to become independent after gains are made with skilled treatment. .   Use of outcome tool(s) and clinical judgement create a POC that gives a: Questionable prediction of patient's progress: MODERATE COMPLEXITY            TREATMENT:   (In addition to Assessment/Re-Assessment sessions the following treatments were rendered)  Pre-treatment Symptoms/Complaints:  Pt states he thinks he is some better.   Pain: Initial:     5/10 Post Session:  5/10      Date:  3/5 Date:   Date:     Activity/Exercise Parameters Parameters Parameters   Lying prone in ext  Used table to position in ext 10 minutes     Standing trunk ext 3 x 10     Prone press ups 2 x 5     Trunk flexion over bent knee 2 x 5                             Treatment/Session Assessment:  Patient is reporting improvements with ex and aquatics. He has centralizing back pain with extension. · Response to Treatment: Pt had decreased pain during session with prone press ups. Difficult for pt to do prone press ups at home due to strength and endurance. · Compliance with Program/Exercises:  Somewhat compliant  · Recommendations/Intent for next treatment session: \"Next visit will focus on mobility, Mimi ex and aquatic. \".   Total Treatment Duration: 45 min  PT Patient Time In/Time Out  Time In: 1030  Time Out: 1115    Raj Fu, PT

## 2018-03-07 ENCOUNTER — HOSPITAL ENCOUNTER (OUTPATIENT)
Dept: PHYSICAL THERAPY | Age: 58
End: 2018-03-07
Payer: COMMERCIAL

## 2018-03-08 ENCOUNTER — HOSPITAL ENCOUNTER (OUTPATIENT)
Dept: PHYSICAL THERAPY | Age: 58
Discharge: HOME | End: 2018-03-08
Payer: COMMERCIAL

## 2018-03-08 NOTE — PROGRESS NOTES
Esdras Najera  : 1960  Primary: Collette Hernández Of [facility]*  Secondary: [place] [id number] [facility] CHRISTIANO CABRERA  [address] [facility], [address] [place] [address],[address], [facility] [address]  [phone]   [phone]       OUTPATIENT PHYSICAL THERAPY:Cancellation Note 3/8/2018    ICD-10: Treatment Diagnosis: low back pain (M54.5)  Precautions/Allergies:   Review of patient's allergies indicates no known allergies. Fall Risk Score: 1 (? 5 = High Risk)  MD Orders: Aquatic therapy Evaluate and treat. MEDICAL/REFERRING DIAGNOSIS:  Low back pain [M54.5] DDD, stenosis  DATE OF ONSET: back problems most of his life  REFERRING PHYSICIAN: Logan Conde MD  RETURN PHYSICIAN APPOINTMENT:       ASSESSMENT:  Mr. Liam Balderas cancelled appointment today due to not having swim trunks because he thought he had land PT today.     Jonh Choe, PTA

## 2018-03-12 ENCOUNTER — HOSPITAL ENCOUNTER (OUTPATIENT)
Dept: PHYSICAL THERAPY | Age: 58
Discharge: HOME | End: 2018-03-12
Payer: COMMERCIAL

## 2018-03-12 PROCEDURE — 97113 AQUATIC THERAPY/EXERCISES: CPT

## 2018-03-12 NOTE — PROGRESS NOTES
Eder Epps  : 1960  Primary: Johnnie Kimballvinita Of [facility]*  Secondary: [place] [id number] [facility] CHRISTIANO CABRERA  1101 [facility], [address] [place] [address],[address], [facility] [address]  [phone]   [phone]       OUTPATIENT PHYSICAL THERAPY:Daily Note 3/12/2018    ICD-10: Treatment Diagnosis: low back pain (M54.5)  Precautions/Allergies:   Review of patient's allergies indicates no known allergies. Fall Risk Score: 1 (? 5 = High Risk)  MD Orders: Aquatic therapy Evaluate and treat. MEDICAL/REFERRING DIAGNOSIS:  Low back pain [M54.5] DDD, stenosis  DATE OF ONSET: back problems most of his life  REFERRING PHYSICIAN: Francine Boyd MD  RETURN PHYSICIAN APPOINTMENT:       INITIAL ASSESSMENT:  Mr. Sohail Velázquez presents with chronic back pain. Pt has done the pool in the past with good results and would like to do the pool again. Pt is good candidate for PT to address problems below. PROBLEM LIST (Impacting functional limitations):  1. Decreased Strength  2. Decreased Ambulation Ability/Technique  3. Increased Pain  4. Decreased Flexibility/Joint Mobility INTERVENTIONS PLANNED:  1. Home Exercise Program (HEP)  2. Manual Therapy  3. Therapeutic Exercise/Strengthening  4. aquatic therapy   TREATMENT PLAN:  Effective Dates: 2/15/18 TO 5/15/18. Frequency/Duration: 2 times a week for 8 weeks  GOALS: (Goals have been discussed and agreed upon with patient.)  Short-Term Functional Goals: Time Frame: 4 weeks  1. Pt will participate in 45 minute aquatic therapy 2/week consistently. 2. Pt will report consistency with HEP  3. Pt will report improved function and / or decrease in symptoms/pain. Discharge Goals: Time Frame: 8 weeks  1. Improved Trunk ROM and improved hip ROM for joint health and ease of movement. 2. Improve Oswestry by 10 points or greater indicating improved function  3.  Pt independent with HEP and/or aquatic program to maintain gains made in PT.  Rehabilitation Potential For Stated Goals: Deborah  Tolujuancarlosfox Hammond's therapy, I certify that the treatment plan above will be carried out by a therapist or under their direction. Thank you for this referral,  Al Lemos, PT     Referring Physician Signature: Magy Jaquez MD              Date                    The information in this section was collected on 2/15/18 (except where otherwise noted). HISTORY:   History of Present Injury/Illness (Reason for Referral):  Pt states he has chronic low back pain and he has radiating pain with standing into B legs to above knee level. He reports both pain and numbness. Standing and walking , bending , stooping increase pain. Sitting decreases pain but depends on support of chair. Pt spends several hour a day in recliner. Past Medical History/Comorbidities:   Mr. Annabelle Wharton  has a past medical history of Allergic rhinitis due to allergen; Arthritis; Colon polyps (8/12/2016); Deficiency, lipoprotein (8/12/2016); Diabetes mellitus type 2, controlled ([facility] [address] (8/12/2016); Encounter for long-term (current) use of medications (8/12/2016); HLD (hyperlipidemia) (8/12/2016); HTN (hypertension) (8/12/2016); Obesity (8/12/2016); Other abnormal glucose (8/12/2016); Sinusitis, acute maxillary (8/12/2016); and URI (upper respiratory infection) (8/12/2016). Mr. Annabelle Wharton  has a past surgical history that includes hx tonsillectomy; hx adenoidectomy; and hx other surgical (2005). 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   Previous Treatment Approaches:          Pt had PT with some aquatic therapy in the past.  Personal Factors:          Profession:  Not working since May 201        Overall Behavior:  Focused on pain and symptoms        Other factors that influence how disability is experienced by the patient:  Obesity, sedentary over past several months  Current Medications:       Current Outpatient Prescriptions:     traMADol (ULTRAM) 50 mg tablet, Take 1 Tab by mouth every six (6) hours as needed for Pain. Max Daily Amount: 200 mg. Indications: Pain, Disp: 120 Tab, Rfl: 2    metroNIDAZOLE (METROGEL) 1 % topical gel, Apply 1 g to affected area daily. Use a thin layer to affected areas after washing, Disp: 60 g, Rfl: 5    lansoprazole (PREVACID) 30 mg capsule, Take 1 Cap by mouth Daily (before breakfast). , Disp: 90 Cap, Rfl: 3    lisinopril-hydroCHLOROthiazide (PRINZIDE, ZESTORETIC) 20-12.5 mg per tablet, Take 1 Tab by mouth daily. , Disp: 90 Tab, Rfl: 3    simvastatin (ZOCOR) 40 mg tablet, Take 1 Tab by mouth daily. , Disp: 90 Tab, Rfl: 3    Liraglutide (VICTOZA) 0.6 mg/0.1 mL (18 mg/3 mL) pnij, 1.8 mg by SubCUTAneous route daily. Indications: type 2 diabetes mellitus, Disp: 3 Pen, Rfl: 11    doxycycline (ADOXA) 100 mg tablet, Take 1 Tab by mouth two (2) times a day. Indications: ACNE ROSACEA, Disp: 100 Tab, Rfl: 2    metoprolol succinate (TOPROL-XL) 200 mg XL tablet, Take 1 Tab by mouth daily. , Disp: 90 Tab, Rfl: 3    FLUoxetine (PROZAC) 20 mg tablet, Take 1 Tab by mouth daily. , Disp: 90 Tab, Rfl: 3    multivitamin (ONE A DAY) tablet, Take 1 Tab by mouth daily. , Disp: , Rfl:     Insulin Needles, Disposable, (NOVOFINE 32) 32 gauge x 1/4\" ndle, 1 misc daily. For Victoza pen, Disp: 100 Pen Needle, Rfl: 3    PEN NEEDLE, DIABETIC (NOVOFINE 32), 32 g by Does Not Apply route daily. Indications: 32G x 6 MM,, Disp: , Rfl:    Date Last Reviewed:  3/12/2018   Number of Personal Factors/Comorbidities that affect the Plan of Care: 3+: HIGH COMPLEXITY   EXAMINATION:   Observation/Orthostatic Postural Assessment:          Pt is obese with poor sitting posture. Pt reports increase in symptoms with corrected sitting posture.   ROM:          Trunk flex- finger tips to knee level - increases pain                 Ext- moderate limitation- painful to do but feels better after                 Side glide- mod limitation no increase pain. Hip flexion - 90 degrees B  Strength:   Grossly WFL LE. Body Structures Involved:  1. Joints  2. Muscles Body Functions Affected:  1. Sensory/Pain  2. Neuromusculoskeletal  3. Movement Related Activities and Participation Affected:  1. Mobility   Number of elements (examined above) that affect the Plan of Care: 4+: HIGH COMPLEXITY   CLINICAL PRESENTATION:   Presentation: Evolving clinical presentation with changing clinical characteristics: MODERATE COMPLEXITY   CLINICAL DECISION MAKING:   Outcome Measure: Tool Used: Modified Oswestry Low Back Pain Questionnaire  Score:  Initial: 36/50  Most Recent: X/50 (Date: -- )   Interpretation of Score: Each section is scored on a 0-5 scale, 5 representing the greatest disability. The scores of each section are added together for a total score of 50. Score 0 1-10 11-20 21-30 31-40 41-49 50   Modifier CH CI CJ CK CL CM CN     ? Mobility - Walking and Moving Around:     - CURRENT STATUS: CL - 60%-79% impaired, limited or restricted    - GOAL STATUS: CK - 40%-59% impaired, limited or restricted    - D/C STATUS:  ---------------To be determined---------------    Medical Necessity:   · Patient is expected to demonstrate progress in strength and range of motion to increase mobility and decrease pain and stiffness. .  Reason for Services/Other Comments:  · Pt has improved in the past with aquatic therapy and should be able to become independent after gains are made with skilled treatment. .   Use of outcome tool(s) and clinical judgement create a POC that gives a: Questionable prediction of patient's progress: MODERATE COMPLEXITY            TREATMENT:   (In addition to Assessment/Re-Assessment sessions the following treatments were rendered)  Pre-treatment Symptoms/Complaints:  Pt states he is doing better.   Pain: Initial:     5/10 Post Session:  5/10     Aquatic Therapy (45 minutes): Aquatic treatment performed per flow grid for Decreased muscle strength, Decreased static/dynamic balance and reactive control, Decreased range of motion, Decreased activity endurance, Decompression and Ease of movement. Cues provided for ex, posture and gait. Aquatic Exercise Log       Date  3/12 Date   Date   Date   Date     Activity/ Exercise Parameters Parameters Parameters Parameters Parameters   Walking forward 6       Walking backward 6       Walking sideways 6         Marching 6         Goose Step 6         Tip toes 3         Heels 3         Lunges 6       Side step squats        LE Exercises 4#         Hip Flex/Ext 15         Hip Abd/Add 15         Hip IR/ER          Calf raises 15         Knee Flex 15         Squats 15         Leg Circles 10/10         Step Ups 15       UE Exercises          Squeeze In          Push Down          Pull Down          Bicep/Tricep        Rows/Press outs         Chi Positions          Trunk Rotation        Deep H2O/ Noodles Noodle and 4#         Stabilization          Arms only          Legs only Jog 2 min       Cross   Country 2 min         Scissors 2 min         Crab walk        Lower abdominal   work           Cardio          Jogging        Lap   Swimming          Stretches          Hamstrings          Heelcords          Piriformis          Neck                Treatment/Session Assessment:  Pt states he is doing some better. He still complains of pain but he states generally better/  · Response to Treatment: Pt had decreased pain during session with prone press ups. Difficult for pt to do prone press ups at home due to strength and endurance. · Compliance with Program/Exercises:  Somewhat compliant  · Recommendations/Intent for next treatment session: \"Next visit will focus on mobility, Mimi ex and aquatic. \".   Total Treatment Duration: 45 min  PT Patient Time In/Time Out  Time In: 0115  Time Out: 0200    Nikolas Friend, PT

## 2018-03-14 ENCOUNTER — HOSPITAL ENCOUNTER (OUTPATIENT)
Dept: PHYSICAL THERAPY | Age: 58
Discharge: HOME | End: 2018-03-14
Payer: COMMERCIAL

## 2018-03-14 PROCEDURE — 97113 AQUATIC THERAPY/EXERCISES: CPT

## 2018-03-14 NOTE — PROGRESS NOTES
Johnathan Davison  : 1960  Primary: Phil Tavares Of [facility]*  Secondary: [facility] [address] [facility] CHRISTIANO CABRERA  1101 [facility], [address] [place] [address] [address], [address] [facility]  [phone]   [phone]       OUTPATIENT PHYSICAL THERAPY:Daily Note 3/14/2018    ICD-10: Treatment Diagnosis: low back pain (M54.5)  Precautions/Allergies:   Review of patient's allergies indicates no known allergies. Fall Risk Score: 1 (? 5 = High Risk)  MD Orders: Aquatic therapy Evaluate and treat. MEDICAL/REFERRING DIAGNOSIS:  Low back pain [M54.5] DDD, stenosis  DATE OF ONSET: back problems most of his life  REFERRING PHYSICIAN: Courtney Cruz MD  RETURN PHYSICIAN APPOINTMENT:       INITIAL ASSESSMENT:  Mr. Jeffery Palafox presents with chronic back pain. Pt has done the pool in the past with good results and would like to do the pool again. Pt is good candidate for PT to address problems below. PROBLEM LIST (Impacting functional limitations):  1. Decreased Strength  2. Decreased Ambulation Ability/Technique  3. Increased Pain  4. Decreased Flexibility/Joint Mobility INTERVENTIONS PLANNED:  1. Home Exercise Program (HEP)  2. Manual Therapy  3. Therapeutic Exercise/Strengthening  4. aquatic therapy   TREATMENT PLAN:  Effective Dates: 2/15/18 TO 5/15/18. Frequency/Duration: 2 times a week for 8 weeks  GOALS: (Goals have been discussed and agreed upon with patient.)  Short-Term Functional Goals: Time Frame: 4 weeks  1. Pt will participate in 45 minute aquatic therapy 2/week consistently. 2. Pt will report consistency with HEP  3. Pt will report improved function and / or decrease in symptoms/pain. Discharge Goals: Time Frame: 8 weeks  1. Improved Trunk ROM and improved hip ROM for joint health and ease of movement. 2. Improve Oswestry by 10 points or greater indicating improved function  3.  Pt independent with HEP and/or aquatic program to maintain gains made in PT.  Rehabilitation Potential For Stated Goals: Deborah Rajputjuancarlosfox Hammond's therapy, I certify that the treatment plan above will be carried out by a therapist or under their direction. Thank you for this referral,  Giulia Dorman PT     Referring Physician Signature: Anthony Brandon MD              Date                    The information in this section was collected on 2/15/18 (except where otherwise noted). HISTORY:   History of Present Injury/Illness (Reason for Referral):  Pt states he has chronic low back pain and he has radiating pain with standing into B legs to above knee level. He reports both pain and numbness. Standing and walking , bending , stooping increase pain. Sitting decreases pain but depends on support of chair. Pt spends several hour a day in recliner. Past Medical History/Comorbidities:   Mr. Quintin Michaels  has a past medical history of Allergic rhinitis due to allergen; Arthritis; Colon polyps (8/12/2016); Deficiency, lipoprotein (8/12/2016); Diabetes mellitus type 2, controlled ([facility] [address] (8/12/2016); Encounter for long-term (current) use of medications (8/12/2016); HLD (hyperlipidemia) (8/12/2016); HTN (hypertension) (8/12/2016); Obesity (8/12/2016); Other abnormal glucose (8/12/2016); Sinusitis, acute maxillary (8/12/2016); and URI (upper respiratory infection) (8/12/2016). Mr. Quintin Michaels  has a past surgical history that includes hx tonsillectomy; hx adenoidectomy; and hx other surgical (2005). 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   Previous Treatment Approaches:          Pt had PT with some aquatic therapy in the past.  Personal Factors:          Profession:  Not working since May 201        Overall Behavior:  Focused on pain and symptoms        Other factors that influence how disability is experienced by the patient:  Obesity, sedentary over past several months  Current Medications:       Current Outpatient Prescriptions:     traMADol (ULTRAM) 50 mg tablet, Take 1 Tab by mouth every six (6) hours as needed for Pain. Max Daily Amount: 200 mg. Indications: Pain, Disp: 120 Tab, Rfl: 2    metroNIDAZOLE (METROGEL) 1 % topical gel, Apply 1 g to affected area daily. Use a thin layer to affected areas after washing, Disp: 60 g, Rfl: 5    lansoprazole (PREVACID) 30 mg capsule, Take 1 Cap by mouth Daily (before breakfast). , Disp: 90 Cap, Rfl: 3    lisinopril-hydroCHLOROthiazide (PRINZIDE, ZESTORETIC) 20-12.5 mg per tablet, Take 1 Tab by mouth daily. , Disp: 90 Tab, Rfl: 3    simvastatin (ZOCOR) 40 mg tablet, Take 1 Tab by mouth daily. , Disp: 90 Tab, Rfl: 3    Liraglutide (VICTOZA) 0.6 mg/0.1 mL (18 mg/3 mL) pnij, 1.8 mg by SubCUTAneous route daily. Indications: type 2 diabetes mellitus, Disp: 3 Pen, Rfl: 11    doxycycline (ADOXA) 100 mg tablet, Take 1 Tab by mouth two (2) times a day. Indications: ACNE ROSACEA, Disp: 100 Tab, Rfl: 2    metoprolol succinate (TOPROL-XL) 200 mg XL tablet, Take 1 Tab by mouth daily. , Disp: 90 Tab, Rfl: 3    FLUoxetine (PROZAC) 20 mg tablet, Take 1 Tab by mouth daily. , Disp: 90 Tab, Rfl: 3    multivitamin (ONE A DAY) tablet, Take 1 Tab by mouth daily. , Disp: , Rfl:     Insulin Needles, Disposable, (NOVOFINE 32) 32 gauge x 1/4\" ndle, 1 misc daily. For Victoza pen, Disp: 100 Pen Needle, Rfl: 3    PEN NEEDLE, DIABETIC (NOVOFINE 32), 32 g by Does Not Apply route daily. Indications: 32G x 6 MM,, Disp: , Rfl:    Date Last Reviewed:  3/14/2018   Number of Personal Factors/Comorbidities that affect the Plan of Care: 3+: HIGH COMPLEXITY   EXAMINATION:   Observation/Orthostatic Postural Assessment:          Pt is obese with poor sitting posture. Pt reports increase in symptoms with corrected sitting posture.   ROM:          Trunk flex- finger tips to knee level - increases pain                 Ext- moderate limitation- painful to do but feels better after                 Side glide- mod limitation no increase pain. Hip flexion - 90 degrees B  Strength:   Grossly WFL LE. Body Structures Involved:  1. Joints  2. Muscles Body Functions Affected:  1. Sensory/Pain  2. Neuromusculoskeletal  3. Movement Related Activities and Participation Affected:  1. Mobility   Number of elements (examined above) that affect the Plan of Care: 4+: HIGH COMPLEXITY   CLINICAL PRESENTATION:   Presentation: Evolving clinical presentation with changing clinical characteristics: MODERATE COMPLEXITY   CLINICAL DECISION MAKING:   Outcome Measure: Tool Used: Modified Oswestry Low Back Pain Questionnaire  Score:  Initial: 36/50  Most Recent: X/50 (Date: -- )   Interpretation of Score: Each section is scored on a 0-5 scale, 5 representing the greatest disability. The scores of each section are added together for a total score of 50. Score 0 1-10 11-20 21-30 31-40 41-49 50   Modifier CH CI CJ CK CL CM CN     ? Mobility - Walking and Moving Around:     - CURRENT STATUS: CL - 60%-79% impaired, limited or restricted    - GOAL STATUS: CK - 40%-59% impaired, limited or restricted    - D/C STATUS:  ---------------To be determined---------------    Medical Necessity:   · Patient is expected to demonstrate progress in strength and range of motion to increase mobility and decrease pain and stiffness. .  Reason for Services/Other Comments:  · Pt has improved in the past with aquatic therapy and should be able to become independent after gains are made with skilled treatment. .   Use of outcome tool(s) and clinical judgement create a POC that gives a: Questionable prediction of patient's progress: MODERATE COMPLEXITY            TREATMENT:   (In addition to Assessment/Re-Assessment sessions the following treatments were rendered)  Pre-treatment Symptoms/Complaints:  Pt states he is doing pretty well today.   Pain: Initial:     3/10 Post Session:  3/10     Aquatic Therapy (45 minutes): Aquatic treatment performed per flow grid for Decreased muscle strength, Decreased static/dynamic balance and reactive control, Decreased range of motion, Decreased activity endurance, Decompression and Ease of movement. Cues provided for ex, posture and gait. Aquatic Exercise Log       Date  3/12 Date  3/14 Date   Date   Date     Activity/ Exercise Parameters Parameters Parameters Parameters Parameters   Walking forward 6 6      Walking backward 6 6      Walking sideways 6 6        Marching 6 6        Goose Step 6 6        Tip toes 3 3        Heels 3 3        Lunges 6 6      Side step squats        LE Exercises 4# 4#        Hip Flex/Ext 15 15        Hip Abd/Add 15 15        Hip IR/ER          Calf raises 15 15        Knee Flex 15 15        Squats 15 15        Leg Circles 10/10 10/10        Step Ups 15 15      UE Exercises          Squeeze In          Push Down          Pull Down          Bicep/Tricep        Rows/Press outs         Chi Positions          Trunk Rotation        Deep H2O/ Noodles Noodle and 4# Noodle and 4#        Stabilization          Arms only          Legs only Jog 2 min Jog 2 min      Cross   Country 2 min 2 min        Scissors 2 min 2 min        Crab walk        Lower abdominal   work           Cardio          Jogging        Lap   Swimming          Stretches          Hamstrings          Heelcords          Piriformis          Neck                Treatment/Session Assessment:  Pt states he is generally doing better. He states he continues to do ex at home. · Response to Treatment: Pt had decreased pain during session with prone press ups. Difficult for pt to do prone press ups at home due to strength and endurance. · Compliance with Program/Exercises:  Somewhat compliant  · Recommendations/Intent for next treatment session: \"Next visit will focus on mobility, Mimi ex and aquatic. \".   Total Treatment Duration: 45 min  PT Patient Time In/Time Out  Time In: 0230  Time Out: 2025    Janey Beltran, PT

## 2018-03-20 ENCOUNTER — HOSPITAL ENCOUNTER (OUTPATIENT)
Dept: PHYSICAL THERAPY | Age: 58
Discharge: HOME | End: 2018-03-20
Payer: COMMERCIAL

## 2018-03-20 PROCEDURE — 97110 THERAPEUTIC EXERCISES: CPT

## 2018-03-21 ENCOUNTER — HOSPITAL ENCOUNTER (OUTPATIENT)
Dept: PHYSICAL THERAPY | Age: 58
Discharge: HOME | End: 2018-03-21
Payer: COMMERCIAL

## 2018-03-21 PROCEDURE — 97113 AQUATIC THERAPY/EXERCISES: CPT

## 2018-03-21 NOTE — PROGRESS NOTES
Madai Sandhu  : 1960  Primary: Nahid Dukes [facility]*  Secondary: [facility] [address] [facility] CHRISTIANO CABRERA  1101 [facility], [address] [place] [address], [facility] [address]  [phone]   [phone]       OUTPATIENT PHYSICAL THERAPY:Daily Note 3/21/2018    ICD-10: Treatment Diagnosis: low back pain (M54.5)  Precautions/Allergies:   Review of patient's allergies indicates no known allergies. Fall Risk Score: 1 (? 5 = High Risk)  MD Orders: Aquatic therapy Evaluate and treat. MEDICAL/REFERRING DIAGNOSIS:  Low back pain [M54.5] DDD, stenosis  DATE OF ONSET: back problems most of his life  REFERRING PHYSICIAN: Mckayla Davidson MD  RETURN PHYSICIAN APPOINTMENT:       INITIAL ASSESSMENT:  Mr. Nacho Kessler presents with chronic back pain. Pt has done the pool in the past with good results and would like to do the pool again. Pt is good candidate for PT to address problems below. PROBLEM LIST (Impacting functional limitations):  1. Decreased Strength  2. Decreased Ambulation Ability/Technique  3. Increased Pain  4. Decreased Flexibility/Joint Mobility INTERVENTIONS PLANNED:  1. Home Exercise Program (HEP)  2. Manual Therapy  3. Therapeutic Exercise/Strengthening  4. aquatic therapy   TREATMENT PLAN:  Effective Dates: 2/15/18 TO 5/15/18. Frequency/Duration: 2 times a week for 8 weeks  GOALS: (Goals have been discussed and agreed upon with patient.)  Short-Term Functional Goals: Time Frame: 4 weeks  1. Pt will participate in 45 minute aquatic therapy 2/week consistently. 2. Pt will report consistency with HEP  3. Pt will report improved function and / or decrease in symptoms/pain. Discharge Goals: Time Frame: 8 weeks  1. Improved Trunk ROM and improved hip ROM for joint health and ease of movement. 2. Improve Oswestry by 10 points or greater indicating improved function  3.  Pt independent with HEP and/or aquatic program to maintain gains made in PT.  Rehabilitation Potential For Stated Goals: Deborah  Tolujuancarlosfox Hammond's therapy, I certify that the treatment plan above will be carried out by a therapist or under their direction. Thank you for this referral,  Marleen More, PT     Referring Physician Signature: Danna Stanton MD              Date                    The information in this section was collected on 2/15/18 (except where otherwise noted). HISTORY:   History of Present Injury/Illness (Reason for Referral):  Pt states he has chronic low back pain and he has radiating pain with standing into B legs to above knee level. He reports both pain and numbness. Standing and walking , bending , stooping increase pain. Sitting decreases pain but depends on support of chair. Pt spends several hour a day in recliner. Past Medical History/Comorbidities:   Mr. Jennifer Suarez  has a past medical history of Allergic rhinitis due to allergen; Arthritis; Colon polyps (8/12/2016); Deficiency, lipoprotein (8/12/2016); Diabetes mellitus type 2, controlled ([facility] [address] (8/12/2016); Encounter for long-term (current) use of medications (8/12/2016); HLD (hyperlipidemia) (8/12/2016); HTN (hypertension) (8/12/2016); Obesity (8/12/2016); Other abnormal glucose (8/12/2016); Sinusitis, acute maxillary (8/12/2016); and URI (upper respiratory infection) (8/12/2016). Mr. Jennifer Suarez  has a past surgical history that includes hx tonsillectomy; hx adenoidectomy; and hx other surgical (2005). 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   Previous Treatment Approaches:          Pt had PT with some aquatic therapy in the past.  Personal Factors:          Profession:  Not working since May 201        Overall Behavior:  Focused on pain and symptoms        Other factors that influence how disability is experienced by the patient:  Obesity, sedentary over past several months  Current Medications:       Current Outpatient Prescriptions:     traMADol (ULTRAM) 50 mg tablet, Take 1 Tab by mouth every six (6) hours as needed for Pain. Max Daily Amount: 200 mg. Indications: Pain, Disp: 120 Tab, Rfl: 2    metroNIDAZOLE (METROGEL) 1 % topical gel, Apply 1 g to affected area daily. Use a thin layer to affected areas after washing, Disp: 60 g, Rfl: 5    lansoprazole (PREVACID) 30 mg capsule, Take 1 Cap by mouth Daily (before breakfast). , Disp: 90 Cap, Rfl: 3    lisinopril-hydroCHLOROthiazide (PRINZIDE, ZESTORETIC) 20-12.5 mg per tablet, Take 1 Tab by mouth daily. , Disp: 90 Tab, Rfl: 3    simvastatin (ZOCOR) 40 mg tablet, Take 1 Tab by mouth daily. , Disp: 90 Tab, Rfl: 3    Liraglutide (VICTOZA) 0.6 mg/0.1 mL (18 mg/3 mL) pnij, 1.8 mg by SubCUTAneous route daily. Indications: type 2 diabetes mellitus, Disp: 3 Pen, Rfl: 11    doxycycline (ADOXA) 100 mg tablet, Take 1 Tab by mouth two (2) times a day. Indications: ACNE ROSACEA, Disp: 100 Tab, Rfl: 2    metoprolol succinate (TOPROL-XL) 200 mg XL tablet, Take 1 Tab by mouth daily. , Disp: 90 Tab, Rfl: 3    FLUoxetine (PROZAC) 20 mg tablet, Take 1 Tab by mouth daily. , Disp: 90 Tab, Rfl: 3    multivitamin (ONE A DAY) tablet, Take 1 Tab by mouth daily. , Disp: , Rfl:     Insulin Needles, Disposable, (NOVOFINE 32) 32 gauge x 1/4\" ndle, 1 misc daily. For Victoza pen, Disp: 100 Pen Needle, Rfl: 3    PEN NEEDLE, DIABETIC (NOVOFINE 32), 32 g by Does Not Apply route daily. Indications: 32G x 6 MM,, Disp: , Rfl:    Date Last Reviewed:  3/21/2018   Number of Personal Factors/Comorbidities that affect the Plan of Care: 3+: HIGH COMPLEXITY   EXAMINATION:   Observation/Orthostatic Postural Assessment:          Pt is obese with poor sitting posture. Pt reports increase in symptoms with corrected sitting posture.   ROM:          Trunk flex- finger tips to knee level - increases pain                 Ext- moderate limitation- painful to do but feels better after                 Side glide- mod limitation no increase pain. Hip flexion - 90 degrees B  Strength:   Grossly WFL LE. Body Structures Involved:  1. Joints  2. Muscles Body Functions Affected:  1. Sensory/Pain  2. Neuromusculoskeletal  3. Movement Related Activities and Participation Affected:  1. Mobility   Number of elements (examined above) that affect the Plan of Care: 4+: HIGH COMPLEXITY   CLINICAL PRESENTATION:   Presentation: Evolving clinical presentation with changing clinical characteristics: MODERATE COMPLEXITY   CLINICAL DECISION MAKING:   Outcome Measure: Tool Used: Modified Oswestry Low Back Pain Questionnaire  Score:  Initial: 36/50  Most Recent: X/50 (Date: -- )   Interpretation of Score: Each section is scored on a 0-5 scale, 5 representing the greatest disability. The scores of each section are added together for a total score of 50. Score 0 1-10 11-20 21-30 31-40 41-49 50   Modifier CH CI CJ CK CL CM CN     ? Mobility - Walking and Moving Around:     - CURRENT STATUS: CL - 60%-79% impaired, limited or restricted    - GOAL STATUS: CK - 40%-59% impaired, limited or restricted    - D/C STATUS:  ---------------To be determined---------------    Medical Necessity:   · Patient is expected to demonstrate progress in strength and range of motion to increase mobility and decrease pain and stiffness. .  Reason for Services/Other Comments:  · Pt has improved in the past with aquatic therapy and should be able to become independent after gains are made with skilled treatment. .   Use of outcome tool(s) and clinical judgement create a POC that gives a: Questionable prediction of patient's progress: MODERATE COMPLEXITY            TREATMENT:   (In addition to Assessment/Re-Assessment sessions the following treatments were rendered)  Pre-treatment Symptoms/Complaints:  Pt states he was sore after yesterday's workout on land.   He states his hip is not hurting today mostly his low back. Pain: Initial:     5/10 Post Session:  4/10     Aquatic Therapy (45 minutes): Aquatic treatment performed per flow grid for Decreased muscle strength, Decreased endurance, Decreased static/dynamic balance and reactive control, Decreased range of motion, Decreased cardiovascular endurance, Decreased activity endurance, Ease of movement and Low impact and reduced weight bearing activity. Cues provided for posture, gait and ex. Aquatic Exercise Log       Date  3/21 Date   Date   Date   Date     Activity/ Exercise Parameters Parameters Parameters Parameters Parameters   Walking forward 8       Walking backward 8       Walking sideways 8         Marching 6         Goose Step 6         Tip toes 3         Heels 3         Lunges        Side step squats        LE Exercises 4#         Hip Flex/Ext 10         Hip Abd/Add 10         Hip IR/ER          Calf raises 10         Knee Flex 10         Squats          Leg Circles 10/10         Step Ups 12       UE Exercises          Squeeze In          Push Down          Pull Down          Bicep/Tricep        Rows/Press outs         Chi Positions          Trunk Rotation        Deep H2O/ Noodles 4# with noodle         Stabilization          Arms only          Legs only Jog 2 min       Cross   Country 2 min         Scissors 2 min         Crab walk        Lower abdominal   work           Cardio          Jogging        Lap   Swimming          Stretches          Hamstrings          Heelcords          Piriformis          Neck              Treatment/Session Assessment:  Pt did well with aquatic therapy today. Began to push pace to work on endurance,    · Response to Treatment:  Decreased pain after session. · Compliance with Program/Exercises:  Somewhat compliant  · Recommendations/Intent for next treatment session: \"Next visit will focus on mobility, Mimi ex and aquatic. \".   Total Treatment Duration: 45 min  PT Patient Time In/Time Out  Time In: 0100  Time Out: 0145    Daija Rosenthal, PT

## 2018-04-12 ENCOUNTER — HOSPITAL ENCOUNTER (OUTPATIENT)
Dept: PHYSICAL THERAPY | Age: 58
Discharge: HOME | End: 2018-04-13
Payer: COMMERCIAL

## 2018-04-12 NOTE — PROGRESS NOTES
Pauline Srivastava  : 1960  Primary: Geo Schroeder Of [facility] M[facility]*  Secondary: Sc Crist[facility]bal [address] at [address], [address] [facility]  [phone]   [phone]       OUTPATIENT PHYSICAL THERAPY:Daily Note 2018    ICD-10: Treatment Diagnosis: low back pain (M54.5)  Precautions/Allergies:   Review of patient's allergies indicates no known allergies. Fall Risk Score: 1 (? 5 = High Risk)  MD Orders: Aquatic therapy Evaluate and treat. MEDICAL/REFERRING DIAGNOSIS:  Low back pain [M54.5] DDD, stenosis  DATE OF ONSET: back problems most of his life  REFERRING PHYSICIAN: Ling Sims MD  RETURN PHYSICIAN APPOINTMENT:        Mr. Willy Fuentes called to cancel appointment today due to not feeling well.      Emi Wild, PTA

## 2018-04-23 ENCOUNTER — HOSPITAL ENCOUNTER (OUTPATIENT)
Dept: PHYSICAL THERAPY | Age: 58
Discharge: HOME | End: 2018-04-23
Payer: COMMERCIAL

## 2018-04-25 ENCOUNTER — HOSPITAL ENCOUNTER (OUTPATIENT)
Dept: PHYSICAL THERAPY | Age: 58
Discharge: HOME | End: 2018-04-25
Payer: COMMERCIAL

## 2018-04-25 PROCEDURE — 97113 AQUATIC THERAPY/EXERCISES: CPT

## 2018-04-25 NOTE — PROGRESS NOTES
Jv De La O  : 1960  Primary: Dolly Palmer Of Pako Meredith*  Secondary: [place] [id number] [facility] CHRISTIANO CABRERA  [address] [place] [address], [address], [facility] MARGE Ren.  [phone]   [phone]       OUTPATIENT PHYSICAL THERAPY:Daily Note 2018    ICD-10: Treatment Diagnosis: low back pain (M54.5)  Precautions/Allergies:   Review of patient's allergies indicates no known allergies. Fall Risk Score: 1 (? 5 = High Risk)  MD Orders: Aquatic therapy Evaluate and treat. MEDICAL/REFERRING DIAGNOSIS:  Low back pain [M54.5] DDD, stenosis  DATE OF ONSET: back problems most of his life  REFERRING PHYSICIAN: Pia Stephenson MD  RETURN PHYSICIAN APPOINTMENT:       INITIAL ASSESSMENT:  Mr. Sherrill Cummings presents with chronic back pain. Pt has done the pool in the past with good results and would like to do the pool again. Pt is good candidate for PT to address problems below. PROBLEM LIST (Impacting functional limitations):  1. Decreased Strength  2. Decreased Ambulation Ability/Technique  3. Increased Pain  4. Decreased Flexibility/Joint Mobility INTERVENTIONS PLANNED:  1. Home Exercise Program (HEP)  2. Manual Therapy  3. Therapeutic Exercise/Strengthening  4. aquatic therapy   TREATMENT PLAN:  Effective Dates: 2/15/18 TO 5/15/18. Frequency/Duration: 2 times a week for 8 weeks  GOALS: (Goals have been discussed and agreed upon with patient.)  Short-Term Functional Goals: Time Frame: 4 weeks  1. Pt will participate in 45 minute aquatic therapy 2/week consistently. 2. Pt will report consistency with HEP  3. Pt will report improved function and / or decrease in symptoms/pain. Discharge Goals: Time Frame: 8 weeks  1. Improved Trunk ROM and improved hip ROM for joint health and ease of movement. 2. Improve Oswestry by 10 points or greater indicating improved function  3.  Pt independent with HEP and/or aquatic program to maintain gains made in PT.  Rehabilitation Potential For Stated Goals: Deborah Rajputjuancarlosfox Hammond's therapy, I certify that the treatment plan above will be carried out by a therapist or under their direction. Thank you for this referral,  Mindi Dewitt, PT     Referring Physician Signature: Rachel Lindsey MD              Date                    The information in this section was collected on 2/15/18 (except where otherwise noted). HISTORY:   History of Present Injury/Illness (Reason for Referral):  Pt states he has chronic low back pain and he has radiating pain with standing into B legs to above knee level. He reports both pain and numbness. Standing and walking , bending , stooping increase pain. Sitting decreases pain but depends on support of chair. Pt spends several hour a day in recliner. Past Medical History/Comorbidities:   Mr. Sebastien Coto  has a past medical history of Allergic rhinitis due to allergen; Arthritis; Colon polyps (8/12/2016); Deficiency, lipoprotein (8/12/2016); Diabetes mellitus type 2, controlled ([facility] [address] (8/12/2016); Encounter for long-term (current) use of medications (8/12/2016); HLD (hyperlipidemia) (8/12/2016); HTN (hypertension) (8/12/2016); Obesity (8/12/2016); Other abnormal glucose (8/12/2016); Sinusitis, acute maxillary (8/12/2016); and URI (upper respiratory infection) (8/12/2016). Mr. Sebastien Coto  has a past surgical history that includes hx tonsillectomy; hx adenoidectomy; and hx other surgical (2005). 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   Previous Treatment Approaches:          Pt had PT with some aquatic therapy in the past.  Personal Factors:          Profession:  Not working since May 201        Overall Behavior:  Focused on pain and symptoms        Other factors that influence how disability is experienced by the patient:  Obesity, sedentary over past several months  Current Medications:       Current Outpatient Prescriptions:     traMADol (ULTRAM) 50 mg tablet, Take 1 Tab by mouth every six (6) hours as needed for Pain. Max Daily Amount: 200 mg. Indications: Pain, Disp: 120 Tab, Rfl: 2    metroNIDAZOLE (METROGEL) 1 % topical gel, Apply 1 g to affected area daily. Use a thin layer to affected areas after washing, Disp: 60 g, Rfl: 5    lansoprazole (PREVACID) 30 mg capsule, Take 1 Cap by mouth Daily (before breakfast). , Disp: 90 Cap, Rfl: 3    lisinopril-hydroCHLOROthiazide (PRINZIDE, ZESTORETIC) 20-12.5 mg per tablet, Take 1 Tab by mouth daily. , Disp: 90 Tab, Rfl: 3    simvastatin (ZOCOR) 40 mg tablet, Take 1 Tab by mouth daily. , Disp: 90 Tab, Rfl: 3    Liraglutide (VICTOZA) 0.6 mg/0.1 mL (18 mg/3 mL) pnij, 1.8 mg by SubCUTAneous route daily. Indications: type 2 diabetes mellitus, Disp: 3 Pen, Rfl: 11    doxycycline (ADOXA) 100 mg tablet, Take 1 Tab by mouth two (2) times a day. Indications: ACNE ROSACEA, Disp: 100 Tab, Rfl: 2    metoprolol succinate (TOPROL-XL) 200 mg XL tablet, Take 1 Tab by mouth daily. , Disp: 90 Tab, Rfl: 3    FLUoxetine (PROZAC) 20 mg tablet, Take 1 Tab by mouth daily. , Disp: 90 Tab, Rfl: 3    multivitamin (ONE A DAY) tablet, Take 1 Tab by mouth daily. , Disp: , Rfl:     Insulin Needles, Disposable, (NOVOFINE 32) 32 gauge x 1/4\" ndle, 1 misc daily. For Victoza pen, Disp: 100 Pen Needle, Rfl: 3    PEN NEEDLE, DIABETIC (NOVOFINE 32), 32 g by Does Not Apply route daily. Indications: 32G x 6 MM,, Disp: , Rfl:    Date Last Reviewed:  4/25/2018   Number of Personal Factors/Comorbidities that affect the Plan of Care: 3+: HIGH COMPLEXITY   EXAMINATION:   Observation/Orthostatic Postural Assessment:          Pt is obese with poor sitting posture. Pt reports increase in symptoms with corrected sitting posture.   ROM:          Trunk flex- finger tips to knee level - increases pain                 Ext- moderate limitation- painful to do but feels better after                 Side glide- mod limitation no increase pain. Hip flexion - 90 degrees B  Strength:   Grossly WFL LE. Body Structures Involved:  1. Joints  2. Muscles Body Functions Affected:  1. Sensory/Pain  2. Neuromusculoskeletal  3. Movement Related Activities and Participation Affected:  1. Mobility   Number of elements (examined above) that affect the Plan of Care: 4+: HIGH COMPLEXITY   CLINICAL PRESENTATION:   Presentation: Evolving clinical presentation with changing clinical characteristics: MODERATE COMPLEXITY   CLINICAL DECISION MAKING:   Outcome Measure: Tool Used: Modified Oswestry Low Back Pain Questionnaire  Score:  Initial: 36/50  Most Recent: X/50 (Date: -- )   Interpretation of Score: Each section is scored on a 0-5 scale, 5 representing the greatest disability. The scores of each section are added together for a total score of 50. Score 0 1-10 11-20 21-30 31-40 41-49 50   Modifier CH CI CJ CK CL CM CN     ? Mobility - Walking and Moving Around:     - CURRENT STATUS: CL - 60%-79% impaired, limited or restricted    - GOAL STATUS: CK - 40%-59% impaired, limited or restricted    - D/C STATUS:  ---------------To be determined---------------    Medical Necessity:   · Patient is expected to demonstrate progress in strength and range of motion to increase mobility and decrease pain and stiffness. .  Reason for Services/Other Comments:  · Pt has improved in the past with aquatic therapy and should be able to become independent after gains are made with skilled treatment. .   Use of outcome tool(s) and clinical judgement create a POC that gives a: Questionable prediction of patient's progress: MODERATE COMPLEXITY            TREATMENT:   (In addition to Assessment/Re-Assessment sessions the following treatments were rendered)  Pre-treatment Symptoms/Complaints:  Pt states he is doing pretty well.   Pain: Initial:     5/10 Post Session:  4/10     Aquatic Therapy (45 minutes): Aquatic treatment performed per flow grid for Decreased muscle strength, Decreased endurance, Decreased static/dynamic balance and reactive control, Decreased range of motion, Decreased cardiovascular endurance, Decreased activity endurance, Ease of movement and Low impact and reduced weight bearing activity. Cues provided for posture, gait and ex. Aquatic Exercise Log       Date  3/21 Date  4/26 Date   Date   Date     Activity/ Exercise Parameters Parameters Parameters Parameters Parameters   Walking forward 8 8      Walking backward 8 8      Walking sideways 8 8        Marching 6 6        Goose Step 6 6        Tip toes 3 3        Heels 3 3        Lunges        Side step squats        LE Exercises 4# 4#        Hip Flex/Ext 10 12        Hip Abd/Add 10 12        Hip IR/ER          Calf raises 10 12        Knee Flex 10 12        Squats          Leg Circles 10/10 12/12        Step Ups 12 12      UE Exercises          Squeeze In          Push Down          Pull Down          Bicep/Tricep        Rows/Press outs         Chi Positions          Trunk Rotation        Deep H2O/ Noodles 4# with noodle 4# with noodle        Stabilization          Arms only          Legs only Jog 2 min Jog 2 min      Cross   Country 2 min 2 min        Scissors 2 min 2 min        Crab walk        Lower abdominal   work           Cardio          Jogging        Lap   Swimming          Stretches          Hamstrings          Heelcords          Piriformis          Neck              Treatment/Session Assessment:  Pt did well with aquatic session. · Response to Treatment:  Decreased pain after session. · Compliance with Program/Exercises:  Somewhat compliant  · Recommendations/Intent for next treatment session: \"Next visit will focus on mobility, Mimi ex and aquatic. \".   Total Treatment Duration: 45 min  PT Patient Time In/Time Out  Time In: 1115  Time Out: 1333 [facility], PT

## 2018-04-30 ENCOUNTER — HOSPITAL ENCOUNTER (OUTPATIENT)
Dept: PHYSICAL THERAPY | Age: 58
Discharge: HOME | End: 2018-04-30
Payer: COMMERCIAL

## 2018-04-30 PROCEDURE — 97113 AQUATIC THERAPY/EXERCISES: CPT

## 2018-04-30 NOTE — PROGRESS NOTES
Homer Cottrell  : 1960  Primary: Madalyn Macon Of Pako Meredith*  Secondary: [place] [address] [facility] CHRISTIANO Erickson[address] [place] [address]  [phone]   [phone]       OUTPATIENT PHYSICAL THERAPY:Daily Note 2018    ICD-10: Treatment Diagnosis: low back pain (M54.5)  Precautions/Allergies:   Review of patient's allergies indicates no known allergies. Fall Risk Score: 1 (? 5 = High Risk)  MD Orders: Aquatic therapy Evaluate and treat. MEDICAL/REFERRING DIAGNOSIS:  Low back pain [M54.5] DDD, stenosis  DATE OF ONSET: back problems most of his life  REFERRING PHYSICIAN: Reji Gomez MD  RETURN PHYSICIAN APPOINTMENT:       INITIAL ASSESSMENT:  Mr. Adriana Le presents with chronic back pain. Pt has done the pool in the past with good results and would like to do the pool again. Pt is good candidate for PT to address problems below. PROBLEM LIST (Impacting functional limitations):  1. Decreased Strength  2. Decreased Ambulation Ability/Technique  3. Increased Pain  4. Decreased Flexibility/Joint Mobility INTERVENTIONS PLANNED:  1. Home Exercise Program (HEP)  2. Manual Therapy  3. Therapeutic Exercise/Strengthening  4. aquatic therapy   TREATMENT PLAN:  Effective Dates: 2/15/18 TO 5/15/18. Frequency/Duration: 2 times a week for 8 weeks  GOALS: (Goals have been discussed and agreed upon with patient.)  Short-Term Functional Goals: Time Frame: 4 weeks  1. Pt will participate in 45 minute aquatic therapy 2/week consistently. 2. Pt will report consistency with HEP  3. Pt will report improved function and / or decrease in symptoms/pain. Discharge Goals: Time Frame: 8 weeks  1. Improved Trunk ROM and improved hip ROM for joint health and ease of movement. 2. Improve Oswestry by 10 points or greater indicating improved function  3.  Pt independent with HEP and/or aquatic program to maintain gains made in PT.  Rehabilitation Potential For Stated Goals: Deborah Rajputjuancarlosfox Hammond's therapy, I certify that the treatment plan above will be carried out by a therapist or under their direction. Thank you for this referral,  William Drake, PT     Referring Physician Signature: Coretta Cummins MD              Date                    The information in this section was collected on 2/15/18 (except where otherwise noted). HISTORY:   History of Present Injury/Illness (Reason for Referral):  Pt states he has chronic low back pain and he has radiating pain with standing into B legs to above knee level. He reports both pain and numbness. Standing and walking , bending , stooping increase pain. Sitting decreases pain but depends on support of chair. Pt spends several hour a day in recliner. Past Medical History/Comorbidities:   Mr. Cris Painter  has a past medical history of Allergic rhinitis due to allergen; Arthritis; Colon polyps (8/12/2016); Deficiency, lipoprotein (8/12/2016); Diabetes mellitus type 2, controlled ([facility] [address] (8/12/2016); Encounter for long-term (current) use of medications (8/12/2016); HLD (hyperlipidemia) (8/12/2016); HTN (hypertension) (8/12/2016); Obesity (8/12/2016); Other abnormal glucose (8/12/2016); Sinusitis, acute maxillary (8/12/2016); and URI (upper respiratory infection) (8/12/2016). Mr. Cris Painter  has a past surgical history that includes hx tonsillectomy; hx adenoidectomy; and hx other surgical (2005). 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   Previous Treatment Approaches:          Pt had PT with some aquatic therapy in the past.  Personal Factors:          Profession:  Not working since May 201        Overall Behavior:  Focused on pain and symptoms        Other factors that influence how disability is experienced by the patient:  Obesity, sedentary over past several months  Current Medications:       Current Outpatient Prescriptions:     traMADol (ULTRAM) 50 mg tablet, Take 1 Tab by mouth every six (6) hours as needed for Pain. Max Daily Amount: 200 mg. Indications: Pain, Disp: 120 Tab, Rfl: 2    metroNIDAZOLE (METROGEL) 1 % topical gel, Apply 1 g to affected area daily. Use a thin layer to affected areas after washing, Disp: 60 g, Rfl: 5    lansoprazole (PREVACID) 30 mg capsule, Take 1 Cap by mouth Daily (before breakfast). , Disp: 90 Cap, Rfl: 3    lisinopril-hydroCHLOROthiazide (PRINZIDE, ZESTORETIC) 20-12.5 mg per tablet, Take 1 Tab by mouth daily. , Disp: 90 Tab, Rfl: 3    simvastatin (ZOCOR) 40 mg tablet, Take 1 Tab by mouth daily. , Disp: 90 Tab, Rfl: 3    Liraglutide (VICTOZA) 0.6 mg/0.1 mL (18 mg/3 mL) pnij, 1.8 mg by SubCUTAneous route daily. Indications: type 2 diabetes mellitus, Disp: 3 Pen, Rfl: 11    doxycycline (ADOXA) 100 mg tablet, Take 1 Tab by mouth two (2) times a day. Indications: ACNE ROSACEA, Disp: 100 Tab, Rfl: 2    metoprolol succinate (TOPROL-XL) 200 mg XL tablet, Take 1 Tab by mouth daily. , Disp: 90 Tab, Rfl: 3    FLUoxetine (PROZAC) 20 mg tablet, Take 1 Tab by mouth daily. , Disp: 90 Tab, Rfl: 3    multivitamin (ONE A DAY) tablet, Take 1 Tab by mouth daily. , Disp: , Rfl:     Insulin Needles, Disposable, (NOVOFINE 32) 32 gauge x 1/4\" ndle, 1 misc daily. For Victoza pen, Disp: 100 Pen Needle, Rfl: 3    PEN NEEDLE, DIABETIC (NOVOFINE 32), 32 g by Does Not Apply route daily. Indications: 32G x 6 MM,, Disp: , Rfl:    Date Last Reviewed:  4/30/2018   Number of Personal Factors/Comorbidities that affect the Plan of Care: 3+: HIGH COMPLEXITY   EXAMINATION:   Observation/Orthostatic Postural Assessment:          Pt is obese with poor sitting posture. Pt reports increase in symptoms with corrected sitting posture.   ROM:          Trunk flex- finger tips to knee level - increases pain                 Ext- moderate limitation- painful to do but feels better after                 Side glide- mod limitation no increase pain. Hip flexion - 90 degrees B  Strength:   Grossly WFL LE. Body Structures Involved:  1. Joints  2. Muscles Body Functions Affected:  1. Sensory/Pain  2. Neuromusculoskeletal  3. Movement Related Activities and Participation Affected:  1. Mobility   Number of elements (examined above) that affect the Plan of Care: 4+: HIGH COMPLEXITY   CLINICAL PRESENTATION:   Presentation: Evolving clinical presentation with changing clinical characteristics: MODERATE COMPLEXITY   CLINICAL DECISION MAKING:   Outcome Measure: Tool Used: Modified Oswestry Low Back Pain Questionnaire  Score:  Initial: 36/50  Most Recent: X/50 (Date: -- )   Interpretation of Score: Each section is scored on a 0-5 scale, 5 representing the greatest disability. The scores of each section are added together for a total score of 50. Score 0 1-10 11-20 21-30 31-40 41-49 50   Modifier CH CI CJ CK CL CM CN     ? Mobility - Walking and Moving Around:     - CURRENT STATUS: CL - 60%-79% impaired, limited or restricted    - GOAL STATUS: CK - 40%-59% impaired, limited or restricted    - D/C STATUS:  ---------------To be determined---------------    Medical Necessity:   · Patient is expected to demonstrate progress in strength and range of motion to increase mobility and decrease pain and stiffness. .  Reason for Services/Other Comments:  · Pt has improved in the past with aquatic therapy and should be able to become independent after gains are made with skilled treatment. .   Use of outcome tool(s) and clinical judgement create a POC that gives a: Questionable prediction of patient's progress: MODERATE COMPLEXITY            TREATMENT:   (In addition to Assessment/Re-Assessment sessions the following treatments were rendered)  Pre-treatment Symptoms/Complaints:  Pt states he is hurting more than last week as he had a bad weekend.   His mother fell and broke her hip  Pain: Initial:     7/10 Post Session:  4/10     Aquatic Therapy (45 minutes): Aquatic treatment performed per flow grid for Decreased muscle strength, Decreased endurance, Decreased static/dynamic balance and reactive control, Decreased range of motion, Decreased cardiovascular endurance, Decreased activity endurance, Ease of movement and Low impact and reduced weight bearing activity. Cues provided for posture, gait and ex. Aquatic Exercise Log       Date  3/21 Date  4/26 Date  4/30 Date   Date     Activity/ Exercise Parameters Parameters Parameters Parameters Parameters   Walking forward 8 8 6     Walking backward 8 8 6     Walking sideways 8 8 6       Marching 6 6 6       Goose Step 6 6 6       Tip toes 3 3 3       Heels 3 3 3       Lunges        Side step squats        LE Exercises 4# 4# 4#       Hip Flex/Ext 10 12 12       Hip Abd/Add 10 12 12       Hip IR/ER          Calf raises 10 12 12       Knee Flex 10 12 12       Squats          Leg Circles 10/10 12/12 10/10       Step Ups 12 12 12     UE Exercises          Squeeze In          Push Down          Pull Down          Bicep/Tricep        Rows/Press outs         Chi Positions          Trunk Rotation        Deep H2O/ Noodles 4# with noodle 4# with noodle 4# with noodle       Stabilization          Arms only          Legs only Jog 2 min Jog 2 min Jog 2 min     Cross   Country 2 min 2 min 2 min       Scissors 2 min 2 min 2 min       Crab walk        Lower abdominal   work           Cardio          Jogging        Lap   Swimming          Stretches          Hamstrings          Heelcords          Piriformis          Neck              Treatment/Session Assessment:  Pt continues to do well with aquatic ex. · Response to Treatment:  Decreased pain after session. · Compliance with Program/Exercises:  Somewhat compliant  · Recommendations/Intent for next treatment session: \"Next visit will focus on mobility, Mimi ex and aquatic. \".   Total Treatment Duration: 45 min  PT Patient Time In/Time Out  Time In: 1100  Time Out: 0416 Ej Amato, PT

## 2018-05-10 ENCOUNTER — HOSPITAL ENCOUNTER (OUTPATIENT)
Dept: PHYSICAL THERAPY | Age: 58
Discharge: HOME | End: 2018-05-10
Payer: COMMERCIAL

## 2018-05-10 PROCEDURE — 97110 THERAPEUTIC EXERCISES: CPT

## 2018-05-10 NOTE — PROGRESS NOTES
Beatriz Venegas  : 1960  Primary: Sheryl Khan Of Pako Meredith*  Secondary: [place] [id number] [facility] CHRISTIANO CABRERA  [address] [place] [address], [address] [place] [address],[address], [facility] [address].  [phone]   [phone]       OUTPATIENT PHYSICAL THERAPY:Daily Note and Discharge 5/10/2018    ICD-10: Treatment Diagnosis: low back pain (M54.5)  Precautions/Allergies:   Review of patient's allergies indicates no known allergies. Fall Risk Score: 1 (? 5 = High Risk)  MD Orders: Aquatic therapy Evaluate and treat. MEDICAL/REFERRING DIAGNOSIS:  Low back pain [M54.5] DDD, stenosis  DATE OF ONSET: back problems most of his life  REFERRING PHYSICIAN: True Burleson MD  RETURN PHYSICIAN APPOINTMENT:       INITIAL ASSESSMENT:  Mr. Izabela Null made some progress with his function regarding his chronic back pain during this episode of therapy. He plans to continue with his aquatic program in a community pool. PROBLEM LIST (Impacting functional limitations):  1. Decreased Strength  2. Decreased Ambulation Ability/Technique  3. Increased Pain  4. Decreased Flexibility/Joint Mobility INTERVENTIONS PLANNED:  1. Home Exercise Program (HEP)  2. Manual Therapy  3. Therapeutic Exercise/Strengthening  4. aquatic therapy   TREATMENT PLAN:  Effective Dates: 2/15/18 TO 5/15/18. Frequency/Duration: 2 times a week for 8 weeks  GOALS: (Goals have been discussed and agreed upon with patient.)  Short-Term Functional Goals: Time Frame: 4 weeks  1. Pt will participate in 45 minute aquatic therapy 2/week consistently. MET  2. Pt will report consistency with HEP  MET  3. Pt will report improved function and / or decrease in symptoms/pain. MET  Discharge Goals: Time Frame: 8 weeks  1. Improved Trunk ROM and improved hip ROM for joint health and ease of movement. MET  2. Improve Oswestry by 10 points or greater indicating improved function  NOT MET  3.  Pt independent with HEP and/or aquatic program to maintain gains made in PT. MET  Rehabilitation Potential For Stated Goals: Deborah Rivera Liam Hammond's therapy, I certify that the treatment plan above will be carried out by a therapist or under their direction. Thank you for this referral,  Travis Stein, PT     Referring Physician Signature: Sav Shea MD              Date                    The information in this section was collected on 2/15/18 (except where otherwise noted). HISTORY:   History of Present Injury/Illness (Reason for Referral):  Pt states he has chronic low back pain and he has radiating pain with standing into B legs to above knee level. He reports both pain and numbness. Standing and walking , bending , stooping increase pain. Sitting decreases pain but depends on support of chair. Pt spends several hour a day in recliner. Past Medical History/Comorbidities:   Mr. Ana Puente  has a past medical history of Allergic rhinitis due to allergen; Arthritis; Colon polyps (8/12/2016); Deficiency, lipoprotein (8/12/2016); [facility] (8/12/2016); Encounter for long-term (current) use of medications (8/12/2016); HLD (hyperlipidemia) (8/12/2016); HTN (hypertension) (8/12/2016); Obesity (8/12/2016); Other abnormal glucose (8/12/2016); Sinusitis, acute maxillary (8/12/2016); and URI (upper respiratory infection) (8/12/2016). Mr. Ana Puente  has a past surgical history that includes hx tonsillectomy; hx adenoidectomy; and hx other surgical (2005). 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   Previous Treatment Approaches:          Pt had PT with some aquatic therapy in the past.  Personal Factors:          Profession:  Not working since May 201        Overall Behavior:  Focused on pain and symptoms        Other factors that influence how disability is experienced by the patient: Obesity, sedentary over past several months  Current Medications:       Current Outpatient Prescriptions:     traMADol (ULTRAM) 50 mg tablet, Take 1 Tab by mouth every six (6) hours as needed for Pain. Max Daily Amount: 200 mg. Indications: Pain, Disp: 120 Tab, Rfl: 2    metroNIDAZOLE (METROGEL) 1 % topical gel, Apply 1 g to affected area daily. Use a thin layer to affected areas after washing, Disp: 60 g, Rfl: 5    lansoprazole (PREVACID) 30 mg capsule, Take 1 Cap by mouth Daily (before breakfast). , Disp: 90 Cap, Rfl: 3    lisinopril-hydroCHLOROthiazide (PRINZIDE, ZESTORETIC) 20-12.5 mg per tablet, Take 1 Tab by mouth daily. , Disp: 90 Tab, Rfl: 3    simvastatin (ZOCOR) 40 mg tablet, Take 1 Tab by mouth daily. , Disp: 90 Tab, Rfl: 3    Liraglutide (VICTOZA) 0.6 mg/0.1 mL (18 mg/3 mL) pnij, 1.8 mg by SubCUTAneous route daily. Indications: type 2 diabetes mellitus, Disp: 3 Pen, Rfl: 11    doxycycline (ADOXA) 100 mg tablet, Take 1 Tab by mouth two (2) times a day. Indications: ACNE ROSACEA, Disp: 100 Tab, Rfl: 2    metoprolol succinate (TOPROL-XL) 200 mg XL tablet, Take 1 Tab by mouth daily. , Disp: 90 Tab, Rfl: 3    FLUoxetine (PROZAC) 20 mg tablet, Take 1 Tab by mouth daily. , Disp: 90 Tab, Rfl: 3    multivitamin (ONE A DAY) tablet, Take 1 Tab by mouth daily. , Disp: , Rfl:     Insulin Needles, Disposable, (NOVOFINE 32) 32 gauge x 1/4\" ndle, 1 misc daily. For Victoza pen, Disp: 100 Pen Needle, Rfl: 3    PEN NEEDLE, DIABETIC (NOVOFINE 32), 32 g by Does Not Apply route daily. Indications: 32G x 6 MM,, Disp: , Rfl:    Date Last Reviewed:  5/10/2018   Number of Personal Factors/Comorbidities that affect the Plan of Care: 3+: HIGH COMPLEXITY   EXAMINATION:   Observation/Orthostatic Postural Assessment:          Pt is obese with poor sitting posture. Pt reports increase in symptoms with corrected sitting posture.   ROM:          Trunk flex- finger tips to knee level - increases pain                 Ext- moderate limitation- painful to do but feels better after                 Side glide- mod limitation no increase pain. Hip flexion - 90 degrees B  Strength:   Grossly WFL LE. Body Structures Involved:  1. Joints  2. Muscles Body Functions Affected:  1. Sensory/Pain  2. Neuromusculoskeletal  3. Movement Related Activities and Participation Affected:  1. Mobility   Number of elements (examined above) that affect the Plan of Care: 4+: HIGH COMPLEXITY   CLINICAL PRESENTATION:   Presentation: Evolving clinical presentation with changing clinical characteristics: MODERATE COMPLEXITY   CLINICAL DECISION MAKING:   Outcome Measure: Tool Used: Modified Oswestry Low Back Pain Questionnaire  Score:  Initial: 36/50  Most Recent: X/50 (Date: -- )   Interpretation of Score: Each section is scored on a 0-5 scale, 5 representing the greatest disability. The scores of each section are added together for a total score of 50. Score 0 1-10 11-20 21-30 31-40 41-49 50   Modifier CH CI CJ CK CL CM CN     ? Mobility - Walking and Moving Around:     - CURRENT STATUS: CL - 60%-79% impaired, limited or restricted    - GOAL STATUS: CK - 40%-59% impaired, limited or restricted    - D/C STATUS:  ---------------To be determined---------------    Medical Necessity:   · Patient is expected to demonstrate progress in strength and range of motion to increase mobility and decrease pain and stiffness. .  Reason for Services/Other Comments:  · Pt has improved in the past with aquatic therapy and should be able to become independent after gains are made with skilled treatment. .   Use of outcome tool(s) and clinical judgement create a POC that gives a: Questionable prediction of patient's progress: MODERATE COMPLEXITY            TREATMENT:   (In addition to Assessment/Re-Assessment sessions the following treatments were rendered)  Pre-treatment Symptoms/Complaints:  Pt   Pain: Initial:     7/10 Post Session:  4/10     Aquatic Therapy (45 minutes): Aquatic treatment performed per flow grid for Decreased muscle strength, Decreased endurance, Decreased static/dynamic balance and reactive control, Decreased range of motion, Decreased cardiovascular endurance, Decreased activity endurance, Ease of movement and Low impact and reduced weight bearing activity. Cues provided for posture, gait and ex. Aquatic Exercise Log       Date  3/21 Date  4/26 Date  5/11/18 Date   Date     Activity/ Exercise Parameters Parameters Parameters Parameters Parameters   Walking forward 8 8 6     Walking backward 8 8 6     Walking sideways 8 8 6       Marching 6 6 6       Goose Step 6 6 6       Tip toes 3 3 3       Heels 3 3 3       Lunges        Side step squats        LE Exercises 4# 4# 4#       Hip Flex/Ext 10 12 12       Hip Abd/Add 10 12 12       Hip IR/ER          Calf raises 10 12 12       Knee Flex 10 12 12       Squats          Leg Circles 10/10 12/12 10/10       Step Ups 12 12 12     UE Exercises          Squeeze In          Push Down          Pull Down          Bicep/Tricep        Rows/Press outs         Chi Positions          Trunk Rotation        Deep H2O/ Noodles 4# with noodle 4# with noodle 4# with noodle       Stabilization          Arms only          Legs only Jog 2 min Jog 2 min Jog 2 min     Cross   Country 2 min 2 min 2 min       Scissors 2 min 2 min 2 min       Crab walk        Lower abdominal   work           Cardio          Jogging        Lap   Swimming          Stretches          Hamstrings          Heelcords          Piriformis          Neck              Treatment/Session Assessment:  Pt continues to do well with aquatic ex. · Response to Treatment:  Decreased pain after session. · Compliance with Program/Exercises:  Somewhat compliant  · Recommendations/Intent for next treatment session: Discharge at this time.   Total Treatment Duration: 45 min  PT Patient Time In/Time Out  Time In: 1115  Time Out: 1333 [facility],

## 2018-10-08 ENCOUNTER — HOSPITAL ENCOUNTER (OUTPATIENT)
Dept: PHYSICAL THERAPY | Age: 58
Discharge: HOME | End: 2018-10-08
Payer: COMMERCIAL

## 2018-10-08 PROCEDURE — 97161 PT EVAL LOW COMPLEX 20 MIN: CPT

## 2018-10-08 NOTE — PROGRESS NOTES
Ambulatory/Rehab Services H2 Model Falls Risk Assessment    Risk Factor Pts. ·   Confusion/Disorientation/Impulsivity  []    4 ·   Symptomatic Depression  []   2 ·   Altered Elimination  []   1 ·   Dizziness/Vertigo  []   1 ·   Gender (Male)  [x]   1 ·   Any administered antiepileptics (anticonvulsants):  []   2 ·   Any administered benzodiazepines:  []   1 ·   Visual Impairment (specify):  []   1 ·   Portable Oxygen Use  []   1 ·   Orthostatic ? BP  []   1 ·   History of Recent Falls (within 3 mos.)  []   5     Ability to Rise from Chair (choose one) Pts. ·   Ability to rise in a single movement  [x]   0 ·   Pushes up, successful in one attempt  []   1 ·   Multiple attempts, but successful  []   3 ·   Unable to rise without assistance  []   4   Total: (5 or greater = High Risk) 1     Falls Prevention Plan:   []                Physical Limitations to Exercise (specify):   []                Mobility Assistance Device (type):   []                Exercise/Equipment Adaptation (specify):    ©2010 [facility] of [address] Patent #[id number].  Federal Law prohibits the replication, distribution or use without written permission from [facility] [employer]

## 2018-10-08 NOTE — THERAPY EVALUATION
Gloria Pond  : 1960  Primary: Alexandra Ovalle Of Pako Meredith*  Secondary: [place]arl[place] [id number] [facility] CHRISTIANO CABRERA  [address] [place] [address], [address], Pedro MARGE Ren.  [phone]   [phone]          OUTPATIENT PHYSICAL THERAPY:Initial Assessment 10/8/2018   ICD-10: Treatment Diagnosis: Low back pain (M54.5)  Precautions/Allergies:   Review of patient's allergies indicates no known allergies. Fall Risk Score: 1 (? 5 = High Risk)  MD Orders: Evaluate and treat, aquatic therapy  MEDICAL/REFERRING DIAGNOSIS:  Low back pain [M54.5]   DATE OF ONSET: Chronic condition  REFERRING PHYSICIAN: Juliette Petersen, *  RETURN PHYSICIAN APPOINTMENT: TBD     INITIAL ASSESSMENT:  Mr. Jack Tanner presents with a long-standing history of low back pain which limits his participation with everyday tasks. Patient demonstrates generalized stiffness throughout lower back and bilateral hips, pain with prolonged standing, and impaired activity participation secondary to lower back pain. Patient is likely to benefit from skilled PT intervention to improve lower back symptoms to facilitate completion of goals. PROBLEM LIST (Impacting functional limitations):  1. Decreased ADL/Functional Activities  2. Decreased Transfer Abilities  3. Decreased Ambulation Ability/Technique  4. Increased Pain  5. Decreased Activity Tolerance  6. Decreased Flexibility/Joint Mobility  7. Decreased [place] with Home Exercise Program INTERVENTIONS PLANNED:  1. Home Exercise Program (HEP)  2. Manual Therapy  3. Range of Motion (ROM)  4. Therapeutic Activites  5. Therapeutic Exercise/Strengthening  6. Transcutaneous Electrical Nerve Stimulation (TENS)  7. Ultrasound (US)  8. Aquatic therapy   TREATMENT PLAN:  Effective Dates: 10/8/2018 TO 12/3/2018.   Frequency/Duration: 2 visits per week for 8 weeks  GOALS: (Goals have been discussed and agreed upon with patient.)  Short-Term Functional Goals: Time Frame: 4 weeks  1. Patient will report 2-3/10 low back pain when at rest and lower Oswestry Low Back Pain Questionnaire to 50%  2. Patient will be able to tolerate 45 minutes of aquatic therapy    Discharge Goals: Time Frame: 8 weeks  1. Patient will report 2-3/10 low back pain with activity and socre <40% limited on Oswestry Low Back Pain Questionnaire  2. Patient will report being able to tolerate riding his lawnmower in order to perform yardwork  3. Will be able to stand x 45 minutes without having to sit due to low back pain. Rehabilitation Potential For Stated Goals: Fair    Regarding Ning Hammond's therapy, I certify that the treatment plan above will be carried out by a therapist or under their direction. Thank you for this referral,      Rikki Asher PT                 The information in this section was collected on 10-8-18 (except where otherwise noted). HISTORY:   History of Present Injury/Illness (Reason for Referral):  Patient reports that he has had a lengthy history of low back pain. He previously participated in aquatic therapy at this clinic earlier in the year, reporting that it helped his pain. Per x-ray and MRI, patient reports that he has herniated discs, congenital stenosis, bone spurs, L hip osteoarthritis and arthritis in his spine. Past Medical History/Comorbidities:   Mr. Pa Espinal  has a past medical history of Allergic rhinitis due to allergen; Arthritis; Colon polyps (8/12/2016); Deficiency, lipoprotein (8/12/2016); [facility] type [facility], [facility] (8/12/2016); Encounter for long-term (current) use of medications (8/12/2016); HLD (hyperlipidemia) (8/12/2016); HTN (hypertension) (8/12/2016); Obesity (8/12/2016); Other abnormal glucose (8/12/2016); Sinusitis, acute maxillary (8/12/2016); and URI (upper respiratory infection) (8/12/2016). Mr. Pa Espinal  has a past surgical history that includes hx tonsillectomy; hx adenoidectomy; and hx other surgical (2005).   Social History/Living Environment:    Patient lives with his spouse in a one-story house with 3-4 steps to enter with one handrail. Prior Level of Function/Work/Activity:  Patient is retired. States that he had to retire because of his back. Worked for [employer] for over 20 years. Current Medications:       Current Outpatient Prescriptions:     traMADol (ULTRAM) 50 mg tablet, Take 1 Tab by mouth every six (6) hours as needed for Pain. Max Daily Amount: 200 mg. Indications: Pain, Disp: 120 Tab, Rfl: 2    metroNIDAZOLE (METROGEL) 1 % topical gel, Apply 1 g to affected area daily. Use a thin layer to affected areas after washing, Disp: 60 g, Rfl: 5    lansoprazole (PREVACID) 30 mg capsule, Take 1 Cap by mouth Daily (before breakfast). , Disp: 90 Cap, Rfl: 3    lisinopril-hydroCHLOROthiazide (PRINZIDE, ZESTORETIC) 20-12.5 mg per tablet, Take 1 Tab by mouth daily. , Disp: 90 Tab, Rfl: 3    simvastatin (ZOCOR) 40 mg tablet, Take 1 Tab by mouth daily. , Disp: 90 Tab, Rfl: 3    metoprolol succinate (TOPROL-XL) 200 mg XL tablet, Take 1 Tab by mouth daily. , Disp: 90 Tab, Rfl: 3    multivitamin (ONE A DAY) tablet, Take 1 Tab by mouth daily. , Disp: , Rfl:     PEN NEEDLE, DIABETIC (NOVOFINE 32), 32 g by Does Not Apply route daily. Indications: 32G x 6 MM,, Disp: , Rfl:   - Gabepentin  - Takes another medication in addition with gabapentin but unable to recall name at initial evaluation   Date Last Reviewed:  10-8-18   Number of Personal Factors/Comorbidities that affect the Plan of Care: 1-2: MODERATE COMPLEXITY   EXAMINATION:   10-8-18    Observation/Orthostatic Postural Assessment:          Patient ambulates with reduced arm swing, upright trunk posture with toe out gait pattern. Bilateral hip external rotation noted with gait. ROM:          Forward bending range of motion 25% limited with increased pain. Back extension range of motion 75% limited. Increased pain noted with forward bending.         Per observation, hip extension limited actively when ambulating. Hip IR limited bilaterally, L > R. Hip ER within normal limits bilaterally. Hip flexion limited bilaterally passively due to increased lower back pain. Palpation: Tightness noted in bilateral lumbar paraspinals with palpation    Strength:          Hip flexion: 5/5 B        Knee extension: 5/5 B        Knee flexion: 5/5 B        Ankle dorsiflexion: 5/5 B  Special Tests:          Straight leg raise and crossed straight leg raise test negative bilaterally. Neurological Screen:        Sensation to light touch intact for all B LE dermatomes  Functional Mobility:         Sit to stand: independent        Bed mobility: Independent but requires extra time to transition from supine to sitting, and from supine to side-lying        Gait: Independent. Observation per above. Stairs: Able to ascend/descend stairs with reciprocal gait pattern with use of single handrail. Body Structures Involved:  1. Nerves  2. Bones  3. Joints  4. Muscles Body Functions Affected:  1. Sensory/Pain  2. Neuromusculoskeletal  3. Movement Related Activities and Participation Affected:  1. Mobility  2. Domestic Life  3. Community, Social and [place]   Number of elements (examined above) that affect the Plan of Care: 4+: HIGH COMPLEXITY   CLINICAL PRESENTATION:   Presentation: Stable and uncomplicated: LOW COMPLEXITY   CLINICAL DECISION MAKING:   Outcome Measure: Tool Used: Modified Oswestry Low Back Pain Questionnaire  Score:  Initial: 33/50 or 66% limited (10-8-18) Most Recent: X/50 (Date: -- )   Interpretation of Score: Each section is scored on a 0-5 scale, 5 representing the greatest disability. The scores of each section are added together for a total score of 50. Score 0 1-10 11-20 21-30 31-40 41-49 50   Modifier CH CI CJ CK CL CM CN     Medical Necessity:   · Skilled intervention continues to be required due to persistent low back pain.   Reason for Services/Other Comments:  · Patient continues to require skilled intervention due to low back pain and impaired mobility secondary to low back pain. Use of outcome tool(s) and clinical judgement create a POC that gives a: Difficult prediction of patient's progress: HIGH COMPLEXITY            TREATMENT:   (In addition to Assessment/Re-Assessment sessions the following treatments were rendered)  Pre-treatment Symptoms/Complaints:  States that he has the most pain with prolonged standing. He is unable to do yardwork at present time due to intolerance to low back pain from riding on lawnmower and with pain from bending over. His wife states that he also needs to be able to walk for longer periods of time, as he currently has difficulty walking in [address] [place] [address] or [facility] due to back pain, which patient stated is difficult for him. Pain: Initial:     5-6/10 Post Session:  No pain value reported after PT     Assessment only today, no treatment provided. Treatment/Session Assessment:    · Response to Treatment:  Patient very stiff throughout lower back, and exhibited difficulty with bed mobility secondary to low back pain. · Compliance with Program/Exercises: Will assess as treatment progresses  · Recommendations/Intent for next treatment session: \"Next visit will focus on reducing lower back pain and improving patient's tolerance to prolonged standing\".   Total Treatment Duration: 35 minutes  PT Patient Time In/Time Out  Time In: 1400  Time Out: [address], PT

## 2018-10-10 ENCOUNTER — HOSPITAL ENCOUNTER (OUTPATIENT)
Dept: PHYSICAL THERAPY | Age: 58
End: 2018-10-10
Payer: COMMERCIAL

## 2018-10-17 ENCOUNTER — HOSPITAL ENCOUNTER (OUTPATIENT)
Dept: PHYSICAL THERAPY | Age: 58
Discharge: HOME | End: 2018-10-17
Payer: COMMERCIAL

## 2018-10-17 PROCEDURE — 97113 AQUATIC THERAPY/EXERCISES: CPT

## 2018-10-17 NOTE — PROGRESS NOTES
Imtiaz Trinh  : 1960  Primary: [place] [facility] [place] [facility]*  Secondary: [place] [id number] [facility] CHRISTIANO PHILLIP[address], [address], [facility].  [phone]   [phone]          OUTPATIENT PHYSICAL THERAPY:Daily Note 10/17/2018   ICD-10: Treatment Diagnosis: Low back pain (M54.5)  Precautions/Allergies:   Patient has no known allergies. Fall Risk Score: 1 (? 5 = High Risk)  MD Orders: Evaluate and treat, aquatic therapy  MEDICAL/REFERRING DIAGNOSIS:  Low back pain [M54.5]   DATE OF ONSET: Chronic condition  REFERRING PHYSICIAN: Juliette Petersen, *  RETURN PHYSICIAN APPOINTMENT: TBD     INITIAL ASSESSMENT:  Mr. Alen Hernandez presents with a long-standing history of low back pain which limits his participation with everyday tasks. Patient demonstrates generalized stiffness throughout lower back and bilateral hips, pain with prolonged standing, and impaired activity participation secondary to lower back pain. Patient is likely to benefit from skilled PT intervention to improve lower back symptoms to facilitate completion of goals. PROBLEM LIST (Impacting functional limitations):  1. Decreased ADL/Functional Activities  2. Decreased Transfer Abilities  3. Decreased Ambulation Ability/Technique  4. Increased Pain  5. Decreased Activity Tolerance  6. Decreased Flexibility/Joint Mobility  7. Decreased [place] with Home Exercise Program INTERVENTIONS PLANNED:  1. Home Exercise Program (HEP)  2. Manual Therapy  3. Range of Motion (ROM)  4. Therapeutic Activites  5. Therapeutic Exercise/Strengthening  6. Transcutaneous Electrical Nerve Stimulation (TENS)  7. Ultrasound (US)  8. Aquatic therapy   TREATMENT PLAN:  Effective Dates: 10/8/2018 TO 12/3/2018. Frequency/Duration: 2 visits per week for 8 weeks  GOALS: (Goals have been discussed and agreed upon with patient.)  Short-Term Functional Goals: Time Frame: 4 weeks  1.  Patient will report 2-3/10 low back pain when at rest and lower Oswestry Low Back Pain Questionnaire to 50%  2. Patient will be able to tolerate 45 minutes of aquatic therapy    Discharge Goals: Time Frame: 8 weeks  1. Patient will report 2-3/10 low back pain with activity and socre <40% limited on Oswestry Low Back Pain Questionnaire  2. Patient will report being able to tolerate riding his lawnmower in order to perform yardwork  3. Will be able to stand x 45 minutes without having to sit due to low back pain. Rehabilitation Potential For Stated Goals: Fair    Regarding Kenroy Hammond's therapy, I certify that the treatment plan above will be carried out by a therapist or under their direction. Thank you for this referral,      Gabriella Schaeffer PT                 The information in this section was collected on 10-8-18 (except where otherwise noted). HISTORY:   History of Present Injury/Illness (Reason for Referral):  Patient reports that he has had a lengthy history of low back pain. He previously participated in aquatic therapy at this clinic earlier in the year, reporting that it helped his pain. Per x-ray and MRI, patient reports that he has herniated discs, congenital stenosis, bone spurs, L hip osteoarthritis and arthritis in his spine. Past Medical History/Comorbidities:   Mr. Dakota Amaral  has a past medical history of Allergic rhinitis due to allergen, Arthritis, Colon polyps, Deficiency, lipoprotein, Diabetes mellitus type 2, controlled ([facility] [address] Encounter for long-term (current) use of medications, HLD (hyperlipidemia), HTN (hypertension), Obesity, Other abnormal glucose, Sinusitis, acute maxillary, and URI (upper respiratory infection). Mr. Dakota Amaral  has a past surgical history that includes hx tonsillectomy; hx adenoidectomy; and hx other surgical (2005). Social History/Living Environment:    Patient lives with his spouse in a one-story house with 3-4 steps to enter with one handrail.   Prior Level of Function/Work/Activity:  Patient is retired. States that he had to retire because of his back. Worked for [employer] for over 20 years. Current Medications:       Current Outpatient Prescriptions:     traMADol (ULTRAM) 50 mg tablet, Take 1 Tab by mouth every six (6) hours as needed for Pain. Max Daily Amount: 200 mg. Indications: Pain, Disp: 120 Tab, Rfl: 2    metroNIDAZOLE (METROGEL) 1 % topical gel, Apply 1 g to affected area daily. Use a thin layer to affected areas after washing, Disp: 60 g, Rfl: 5    lansoprazole (PREVACID) 30 mg capsule, Take 1 Cap by mouth Daily (before breakfast). , Disp: 90 Cap, Rfl: 3    lisinopril-hydroCHLOROthiazide (PRINZIDE, ZESTORETIC) 20-12.5 mg per tablet, Take 1 Tab by mouth daily. , Disp: 90 Tab, Rfl: 3    simvastatin (ZOCOR) 40 mg tablet, Take 1 Tab by mouth daily. , Disp: 90 Tab, Rfl: 3    metoprolol succinate (TOPROL-XL) 200 mg XL tablet, Take 1 Tab by mouth daily. , Disp: 90 Tab, Rfl: 3    multivitamin (ONE A DAY) tablet, Take 1 Tab by mouth daily. , Disp: , Rfl:     PEN NEEDLE, DIABETIC (NOVOFINE 32), 32 g by Does Not Apply route daily. Indications: 32G x 6 MM,, Disp: , Rfl:   - Gabepentin  - Takes another medication in addition with gabapentin but unable to recall name at initial evaluation   Date Last Reviewed:  10/17/2018   Number of Personal Factors/Comorbidities that affect the Plan of Care: 1-2: MODERATE COMPLEXITY   EXAMINATION:   10-8-18    Observation/Orthostatic Postural Assessment:          Patient ambulates with reduced arm swing, upright trunk posture with toe out gait pattern. Bilateral hip external rotation noted with gait. ROM:          Forward bending range of motion 25% limited with increased pain. Back extension range of motion 75% limited. Increased pain noted with forward bending. Per observation, hip extension limited actively when ambulating. Hip IR limited bilaterally, L > R. Hip ER within normal limits bilaterally.  Hip flexion limited bilaterally passively due to increased lower back pain. Palpation: Tightness noted in bilateral lumbar paraspinals with palpation    Strength:          Hip flexion: 5/5 B        Knee extension: 5/5 B        Knee flexion: 5/5 B        Ankle dorsiflexion: 5/5 B  Special Tests:          Straight leg raise and crossed straight leg raise test negative bilaterally. Neurological Screen:        Sensation to light touch intact for all B LE dermatomes  Functional Mobility:         Sit to stand: independent        Bed mobility: Independent but requires extra time to transition from supine to sitting, and from supine to side-lying        Gait: Independent. Observation per above. Stairs: Able to ascend/descend stairs with reciprocal gait pattern with use of single handrail. Body Structures Involved:  1. Nerves  2. Bones  3. Joints  4. Muscles Body Functions Affected:  1. Sensory/Pain  2. Neuromusculoskeletal  3. Movement Related Activities and Participation Affected:  1. Mobility  2. Domestic Life  3. Community, Social and [place]   Number of elements (examined above) that affect the Plan of Care: 4+: HIGH COMPLEXITY   CLINICAL PRESENTATION:   Presentation: Stable and uncomplicated: LOW COMPLEXITY   CLINICAL DECISION MAKING:   Outcome Measure: Tool Used: Modified Oswestry Low Back Pain Questionnaire  Score:  Initial: 33/50 or 66% limited (10-8-18) Most Recent: X/50 (Date: -- )   Interpretation of Score: Each section is scored on a 0-5 scale, 5 representing the greatest disability. The scores of each section are added together for a total score of 50. Score 0 1-10 11-20 21-30 31-40 41-49 50   Modifier CH CI CJ CK CL CM CN     Medical Necessity:   · Skilled intervention continues to be required due to persistent low back pain. Reason for Services/Other Comments:  · Patient continues to require skilled intervention due to low back pain and impaired mobility secondary to low back pain.    Use of outcome tool(s) and clinical judgement create a POC that gives a: Difficult prediction of patient's progress: HIGH COMPLEXITY            TREATMENT:   (In addition to Assessment/Re-Assessment sessions the following treatments were rendered)  Pre-treatment Symptoms/Complaints:  Doing much better than last episode of therapy. Continues to back pain and stiffness. Pain: Initial:    6/10 Post Session:  4/10     Aquatic Therapy (45 minutes): Aquatic treatment performed per flow grid for Decreased muscle strength, Decreased endurance, Decreased static/dynamic balance and reactive control, Decreased range of motion, Decreased activity endurance, Decompression and Ease of movement. Cues provided for posture, ex and gait. Aquatic Exercise Log       Date  10/17 Date   Date   Date   Date     Activity/ Exercise Parameters Parameters Parameters Parameters Parameters   Walking forward 6       Walking backward 6       Walking sideways 6         Marching 6         Goose Step 6         Tip toes 3         Heels 3         Lunges        Side step squats        LE Exercises 4#         Hip Flex/Ext 10         Hip Abd/Add 10         Hip IR/ER          Calf raises 10         Knee Flex 10         Squats          Leg Circles 10/10         Step Ups 10       UE Exercises          Squeeze In          Push Down          Pull Down          Bicep/Tricep        Rows/Press outs         Chi Positions          Trunk Rotation        Deep H2O/ Noodles 7' with noodle         Stabilization          Arms only          Legs only Jog 2 min       Cross   Country 2 min         Scissors 1 min         Crab walk        Lower abdominal   work           Cardio          Jogging        Lap   Swimming          Stretches          Hamstrings          Heelcords          Piriformis          Neck                Treatment/Session Assessment:    · Response to Treatment:  Patient did well with aquatic therapy. Will progress as tolerated.   · Compliance with Program/Exercises: Will assess as treatment progresses  · Recommendations/Intent for next treatment session: \"Next visit will focus on reducing lower back pain and improving patient's tolerance to prolonged standing\".   Total Treatment Duration: 45 minutes       Erin Berrios PT

## 2018-10-22 ENCOUNTER — HOSPITAL ENCOUNTER (OUTPATIENT)
Dept: PHYSICAL THERAPY | Age: 58
Discharge: HOME | End: 2018-10-22
Payer: COMMERCIAL

## 2018-10-22 PROCEDURE — 97113 AQUATIC THERAPY/EXERCISES: CPT

## 2018-10-22 NOTE — PROGRESS NOTES
Destiny Haro  : 1960  Primary: [facility]*  Secondary: [place] [id number] [facility] CHRISTIANO CABRERA  [address] [place] [address], [address], [place] Ren.  [phone]   [phone]          OUTPATIENT PHYSICAL THERAPY:Daily Note 10/22/2018   ICD-10: Treatment Diagnosis: Low back pain (M54.5)  Precautions/Allergies:   Patient has no known allergies. Fall Risk Score: 1 (? 5 = High Risk)  MD Orders: Evaluate and treat, aquatic therapy  MEDICAL/REFERRING DIAGNOSIS:  Low back pain [M54.5]   DATE OF ONSET: Chronic condition  REFERRING PHYSICIAN: Juliette Petersen, *  RETURN PHYSICIAN APPOINTMENT: TBD     INITIAL ASSESSMENT:  Mr. Ankit De La O presents with a long-standing history of low back pain which limits his participation with everyday tasks. Patient demonstrates generalized stiffness throughout lower back and bilateral hips, pain with prolonged standing, and impaired activity participation secondary to lower back pain. Patient is likely to benefit from skilled PT intervention to improve lower back symptoms to facilitate completion of goals. PROBLEM LIST (Impacting functional limitations):  1. Decreased ADL/Functional Activities  2. Decreased Transfer Abilities  3. Decreased Ambulation Ability/Technique  4. Increased Pain  5. Decreased Activity Tolerance  6. Decreased Flexibility/Joint Mobility  7. Decreased [place] with Home Exercise Program INTERVENTIONS PLANNED:  1. Home Exercise Program (HEP)  2. Manual Therapy  3. Range of Motion (ROM)  4. Therapeutic Activites  5. Therapeutic Exercise/Strengthening  6. Transcutaneous Electrical Nerve Stimulation (TENS)  7. Ultrasound (US)  8. Aquatic therapy   TREATMENT PLAN:  Effective Dates: 10/8/2018 TO 12/3/2018. Frequency/Duration: 2 visits per week for 8 weeks  GOALS: (Goals have been discussed and agreed upon with patient.)  Short-Term Functional Goals: Time Frame: 4 weeks  1.  Patient will report 2-3/10 low back pain when at rest and lower Oswestry Low Back Pain Questionnaire to 50%  2. Patient will be able to tolerate 45 minutes of aquatic therapy    Discharge Goals: Time Frame: 8 weeks  1. Patient will report 2-3/10 low back pain with activity and socre <40% limited on Oswestry Low Back Pain Questionnaire  2. Patient will report being able to tolerate riding his lawnmower in order to perform yardwork  3. Will be able to stand x 45 minutes without having to sit due to low back pain. Rehabilitation Potential For Stated Goals: Fair    Regarding Doreen Hammond's therapy, I certify that the treatment plan above will be carried out by a therapist or under their direction. Thank you for this referral,      Shahrzad Diaz PT                 The information in this section was collected on 10-8-18 (except where otherwise noted). HISTORY:   History of Present Injury/Illness (Reason for Referral):  Patient reports that he has had a lengthy history of low back pain. He previously participated in aquatic therapy at this clinic earlier in the year, reporting that it helped his pain. Per x-ray and MRI, patient reports that he has herniated discs, congenital stenosis, bone spurs, L hip osteoarthritis and arthritis in his spine. Past Medical History/Comorbidities:   Mr. Candance Natal  has a past medical history of Allergic rhinitis due to allergen, Arthritis, Colon polyps, Deficiency, lipoprotein, Diabetes mellitus type 2, controlled ([facility] [address] Encounter for long-term (current) use of medications, HLD (hyperlipidemia), HTN (hypertension), Obesity, Other abnormal glucose, Sinusitis, acute maxillary, and URI (upper respiratory infection). Mr. Candance Natal  has a past surgical history that includes hx tonsillectomy; hx adenoidectomy; and hx other surgical (). Social History/Living Environment:    Patient lives with his spouse in a one-story house with 3-4 steps to enter with one handrail.   Prior Level of Function/Work/Activity:  Patient is retired. States that he had to retire because of his back. Worked for [employer] for over 20 years. Current Medications:       Current Outpatient Prescriptions:     traMADol (ULTRAM) 50 mg tablet, Take 1 Tab by mouth every six (6) hours as needed for Pain. Max Daily Amount: 200 mg. Indications: Pain, Disp: 120 Tab, Rfl: 2    metroNIDAZOLE (METROGEL) 1 % topical gel, Apply 1 g to affected area daily. Use a thin layer to affected areas after washing, Disp: 60 g, Rfl: 5    lansoprazole (PREVACID) 30 mg capsule, Take 1 Cap by mouth Daily (before breakfast). , Disp: 90 Cap, Rfl: 3    lisinopril-hydroCHLOROthiazide (PRINZIDE, ZESTORETIC) 20-12.5 mg per tablet, Take 1 Tab by mouth daily. , Disp: 90 Tab, Rfl: 3    simvastatin (ZOCOR) 40 mg tablet, Take 1 Tab by mouth daily. , Disp: 90 Tab, Rfl: 3    metoprolol succinate (TOPROL-XL) 200 mg XL tablet, Take 1 Tab by mouth daily. , Disp: 90 Tab, Rfl: 3    multivitamin (ONE A DAY) tablet, Take 1 Tab by mouth daily. , Disp: , Rfl:     PEN NEEDLE, DIABETIC (NOVOFINE 32), 32 g by Does Not Apply route daily. Indications: 32G x 6 MM,, Disp: , Rfl:   - Gabepentin  - Takes another medication in addition with gabapentin but unable to recall name at initial evaluation   Date Last Reviewed:  10/22/2018   Number of Personal Factors/Comorbidities that affect the Plan of Care: 1-2: MODERATE COMPLEXITY   EXAMINATION:   10-8-18    Observation/Orthostatic Postural Assessment:          Patient ambulates with reduced arm swing, upright trunk posture with toe out gait pattern. Bilateral hip external rotation noted with gait. ROM:          Forward bending range of motion 25% limited with increased pain. Back extension range of motion 75% limited. Increased pain noted with forward bending. Per observation, hip extension limited actively when ambulating. Hip IR limited bilaterally, L > R. Hip ER within normal limits bilaterally.  Hip flexion limited bilaterally passively due to increased lower back pain. Palpation: Tightness noted in bilateral lumbar paraspinals with palpation    Strength:          Hip flexion: 5/5 B        Knee extension: 5/5 B        Knee flexion: 5/5 B        Ankle dorsiflexion: 5/5 B  Special Tests:          Straight leg raise and crossed straight leg raise test negative bilaterally. Neurological Screen:        Sensation to light touch intact for all B LE dermatomes  Functional Mobility:         Sit to stand: independent        Bed mobility: Independent but requires extra time to transition from supine to sitting, and from supine to side-lying        Gait: Independent. Observation per above. Stairs: Able to ascend/descend stairs with reciprocal gait pattern with use of single handrail. Body Structures Involved:  1. Nerves  2. Bones  3. Joints  4. Muscles Body Functions Affected:  1. Sensory/Pain  2. Neuromusculoskeletal  3. Movement Related Activities and Participation Affected:  1. Mobility  2. Domestic Life  3. Community, Social and [employer]   Number of elements (examined above) that affect the Plan of Care: 4+: HIGH COMPLEXITY   CLINICAL PRESENTATION:   Presentation: Stable and uncomplicated: LOW COMPLEXITY   CLINICAL DECISION MAKING:   Outcome Measure: Tool Used: Modified Oswestry Low Back Pain Questionnaire  Score:  Initial: 33/50 or 66% limited (10-8-18) Most Recent: X/50 (Date: -- )   Interpretation of Score: Each section is scored on a 0-5 scale, 5 representing the greatest disability. The scores of each section are added together for a total score of 50. Score 0 1-10 11-20 21-30 31-40 41-49 50   Modifier CH CI CJ CK CL CM CN     Medical Necessity:   · Skilled intervention continues to be required due to persistent low back pain. Reason for Services/Other Comments:  · Patient continues to require skilled intervention due to low back pain and impaired mobility secondary to low back pain.    Use of outcome tool(s) and clinical judgement create a POC that gives a: Difficult prediction of patient's progress: HIGH COMPLEXITY            TREATMENT:   (In addition to Assessment/Re-Assessment sessions the following treatments were rendered)  Pre-treatment Symptoms/Complaints:  Back is hurting and he is stiff today. He thinks he overdid with some yard work over the weekend. Pain: Initial:    6/10 Post Session:  4/10     Aquatic Therapy (45 minutes): Aquatic treatment performed per flow grid for Decreased muscle strength, Decreased endurance, Decreased static/dynamic balance and reactive control, Decreased range of motion, Decreased activity endurance, Decompression and Ease of movement. Cues provided for posture, ex and gait.      Aquatic Exercise Log       Date  10/17 Date  10/22/ Date   Date   Date     Activity/ Exercise Parameters Parameters Parameters Parameters Parameters   Walking forward 6 10      Walking backward 6 6      Walking sideways 6 6        Marching 6 6        Goose Step 6 6        Tip toes 3 3        Heels 3 3        Lunges        Side step squats        LE Exercises 4# 4#        Hip Flex/Ext 10 15        Hip Abd/Add 10 15        Hip IR/ER          Calf raises 10 15        Knee Flex 10 15        Squats          Leg Circles 10/10 10/10        Step Ups 10 10      UE Exercises          Squeeze In          Push Down          Pull Down          Bicep/Tricep        Rows/Press outs         Chi Positions          Trunk Rotation        Deep H2O/ Noodles 7' with noodle 7' with noodle        Stabilization          Arms only          Legs only Jog 2 min Jog 2 min      Cross   Country 2 min 2 min        Scissors 1 min 1 min        Crab walk        Lower abdominal   work           Cardio          Jogging        Lap   Swimming          Stretches          Hamstrings          Heelcords          Piriformis          Neck                Treatment/Session Assessment:    · Response to Treatment:  Patient did well and felt better after session. · Compliance with Program/Exercises: Will assess as treatment progresses  · Recommendations/Intent for next treatment session: \"Next visit will focus on reducing lower back pain and improving patient's tolerance to prolonged standing\".   Total Treatment Duration: 45 minutes  PT Patient Time In/Time Out  Time In: 1145  Time Out: Jessenia 54, PT

## 2018-10-26 ENCOUNTER — APPOINTMENT (OUTPATIENT)
Dept: PHYSICAL THERAPY | Age: 58
End: 2018-10-26
Payer: COMMERCIAL

## 2018-10-29 ENCOUNTER — HOSPITAL ENCOUNTER (OUTPATIENT)
Dept: PHYSICAL THERAPY | Age: 58
Discharge: HOME | End: 2018-10-29
Payer: COMMERCIAL

## 2018-10-29 PROCEDURE — 97113 AQUATIC THERAPY/EXERCISES: CPT

## 2018-10-29 NOTE — PROGRESS NOTES
Kaye Qiu  : 1960  Primary: [facility]*  Secondary: [address] at [facility] CHRISTIANO PHILLIP[address], [address], [address] [facility]  [phone]   [phone]          OUTPATIENT PHYSICAL THERAPY:Daily Note 10/29/2018   ICD-10: Treatment Diagnosis: Low back pain (M54.5)  Precautions/Allergies:   Patient has no known allergies. Fall Risk Score: 1 (? 5 = High Risk)  MD Orders: Evaluate and treat, aquatic therapy  MEDICAL/REFERRING DIAGNOSIS:  Low back pain [M54.5]   DATE OF ONSET: Chronic condition  REFERRING PHYSICIAN: Juliette Petersen, *  RETURN PHYSICIAN APPOINTMENT: TBD     INITIAL ASSESSMENT:  Mr. Christiano Huertas presents with a long-standing history of low back pain which limits his participation with everyday tasks. Patient demonstrates generalized stiffness throughout lower back and bilateral hips, pain with prolonged standing, and impaired activity participation secondary to lower back pain. Patient is likely to benefit from skilled PT intervention to improve lower back symptoms to facilitate completion of goals. PROBLEM LIST (Impacting functional limitations):  1. Decreased ADL/Functional Activities  2. Decreased Transfer Abilities  3. Decreased Ambulation Ability/Technique  4. Increased Pain  5. Decreased Activity Tolerance  6. Decreased Flexibility/Joint Mobility  7. Decreased [place] with Home Exercise Program INTERVENTIONS PLANNED:  1. Home Exercise Program (HEP)  2. Manual Therapy  3. Range of Motion (ROM)  4. Therapeutic Activites  5. Therapeutic Exercise/Strengthening  6. Transcutaneous Electrical Nerve Stimulation (TENS)  7. Ultrasound (US)  8. Aquatic therapy   TREATMENT PLAN:  Effective Dates: 10/8/2018 TO 12/3/2018. Frequency/Duration: 2 visits per week for 8 weeks  GOALS: (Goals have been discussed and agreed upon with patient.)  Short-Term Functional Goals: Time Frame: 4 weeks  1.  Patient will report 2-3/10 low back pain when at rest and lower Oswestry Low Back Pain Questionnaire to 50%  2. Patient will be able to tolerate 45 minutes of aquatic therapy    Discharge Goals: Time Frame: 8 weeks  1. Patient will report 2-3/10 low back pain with activity and socre <40% limited on Oswestry Low Back Pain Questionnaire  2. Patient will report being able to tolerate riding his lawnmower in order to perform yardwork  3. Will be able to stand x 45 minutes without having to sit due to low back pain. Rehabilitation Potential For Stated Goals: Fair    Regarding Wilver Hammond's therapy, I certify that the treatment plan above will be carried out by a therapist or under their direction. Thank you for this referral,      Kashif Pagan PT                 The information in this section was collected on 10-8-18 (except where otherwise noted). HISTORY:   History of Present Injury/Illness (Reason for Referral):  Patient reports that he has had a lengthy history of low back pain. He previously participated in aquatic therapy at this clinic earlier in the year, reporting that it helped his pain. Per x-ray and MRI, patient reports that he has herniated discs, congenital stenosis, bone spurs, L hip osteoarthritis and arthritis in his spine. Past Medical History/Comorbidities:   Mr. Aarti Pereyra  has a past medical history of Allergic rhinitis due to allergen, Arthritis, Colon polyps, Deficiency, lipoprotein, Diabetes mellitus type 2, controlled ([facility] [address] Encounter for long-term (current) use of medications, HLD (hyperlipidemia), HTN (hypertension), Obesity, Other abnormal glucose, Sinusitis, acute maxillary, and URI (upper respiratory infection). Mr. Aarti Pereyra  has a past surgical history that includes hx tonsillectomy; hx adenoidectomy; and hx other surgical (2005). Social History/Living Environment:    Patient lives with his spouse in a one-story house with 3-4 steps to enter with one handrail.   Prior Level of Function/Work/Activity:  Patient is retired. States that he had to retire because of his back. Worked for [employer] for over 20 years. Current Medications:       Current Outpatient Prescriptions:     traMADol (ULTRAM) 50 mg tablet, Take 1 Tab by mouth every six (6) hours as needed for Pain. Max Daily Amount: 200 mg. Indications: Pain, Disp: 120 Tab, Rfl: 2    metroNIDAZOLE (METROGEL) 1 % topical gel, Apply 1 g to affected area daily. Use a thin layer to affected areas after washing, Disp: 60 g, Rfl: 5    lansoprazole (PREVACID) 30 mg capsule, Take 1 Cap by mouth Daily (before breakfast). , Disp: 90 Cap, Rfl: 3    lisinopril-hydroCHLOROthiazide (PRINZIDE, ZESTORETIC) 20-12.5 mg per tablet, Take 1 Tab by mouth daily. , Disp: 90 Tab, Rfl: 3    simvastatin (ZOCOR) 40 mg tablet, Take 1 Tab by mouth daily. , Disp: 90 Tab, Rfl: 3    metoprolol succinate (TOPROL-XL) 200 mg XL tablet, Take 1 Tab by mouth daily. , Disp: 90 Tab, Rfl: 3    multivitamin (ONE A DAY) tablet, Take 1 Tab by mouth daily. , Disp: , Rfl:     PEN NEEDLE, DIABETIC (NOVOFINE 32), 32 g by Does Not Apply route daily. Indications: 32G x 6 MM,, Disp: , Rfl:   - Gabepentin  - Takes another medication in addition with gabapentin but unable to recall name at initial evaluation   Date Last Reviewed:  10/29/2018   Number of Personal Factors/Comorbidities that affect the Plan of Care: 1-2: MODERATE COMPLEXITY   EXAMINATION:   10-8-18    Observation/Orthostatic Postural Assessment:          Patient ambulates with reduced arm swing, upright trunk posture with toe out gait pattern. Bilateral hip external rotation noted with gait. ROM:          Forward bending range of motion 25% limited with increased pain. Back extension range of motion 75% limited. Increased pain noted with forward bending. Per observation, hip extension limited actively when ambulating. Hip IR limited bilaterally, L > R. Hip ER within normal limits bilaterally.  Hip flexion limited bilaterally passively due to increased lower back pain. Palpation: Tightness noted in bilateral lumbar paraspinals with palpation    Strength:          Hip flexion: 5/5 B        Knee extension: 5/5 B        Knee flexion: 5/5 B        Ankle dorsiflexion: 5/5 B  Special Tests:          Straight leg raise and crossed straight leg raise test negative bilaterally. Neurological Screen:        Sensation to light touch intact for all B LE dermatomes  Functional Mobility:         Sit to stand: independent        Bed mobility: Independent but requires extra time to transition from supine to sitting, and from supine to side-lying        Gait: Independent. Observation per above. Stairs: Able to ascend/descend stairs with reciprocal gait pattern with use of single handrail. Body Structures Involved:  1. Nerves  2. Bones  3. Joints  4. Muscles Body Functions Affected:  1. Sensory/Pain  2. Neuromusculoskeletal  3. Movement Related Activities and Participation Affected:  1. Mobility  2. Domestic Life  3. Community, Social and [place]   Number of elements (examined above) that affect the Plan of Care: 4+: HIGH COMPLEXITY   CLINICAL PRESENTATION:   Presentation: Stable and uncomplicated: LOW COMPLEXITY   CLINICAL DECISION MAKING:   Outcome Measure: Tool Used: Modified Oswestry Low Back Pain Questionnaire  Score:  Initial: 33/50 or 66% limited (10-8-18) Most Recent: X/50 (Date: -- )   Interpretation of Score: Each section is scored on a 0-5 scale, 5 representing the greatest disability. The scores of each section are added together for a total score of 50. Score 0 1-10 11-20 21-30 31-40 41-49 50   Modifier CH CI CJ CK CL CM CN     Medical Necessity:   · Skilled intervention continues to be required due to persistent low back pain. Reason for Services/Other Comments:  · Patient continues to require skilled intervention due to low back pain and impaired mobility secondary to low back pain.    Use of outcome tool(s) and clinical judgement create a POC that gives a: Difficult prediction of patient's progress: HIGH COMPLEXITY            TREATMENT:   (In addition to Assessment/Re-Assessment sessions the following treatments were rendered)  Pre-treatment Symptoms/Complaints:  Continues to have back pain. Pain: Initial:    6/10 Post Session:  4/10     Aquatic Therapy (45 minutes): Aquatic treatment performed per flow grid for Decreased muscle strength, Decreased endurance, Decreased static/dynamic balance and reactive control, Decreased range of motion, Decreased activity endurance, Decompression and Ease of movement. Cues provided for posture, ex and gait.      Aquatic Exercise Log       Date  10/17 Date  10/22/ Date  10/29 Date   Date     Activity/ Exercise Parameters Parameters Parameters Parameters Parameters   Walking forward 6 10 6     Walking backward 6 6 6     Walking sideways 6 6 6       Marching 6 6 6       Goose Step 6 6 6       Tip toes 3 3 3       Heels 3 3 3       Lunges        Side step squats        LE Exercises 4# 4# 4#       Hip Flex/Ext 10 15 15       Hip Abd/Add 10 15 15       Hip IR/ER          Calf raises 10 15 15       Knee Flex 10 15 15       Squats          Leg Circles 10/10 10/10 10/10       Step Ups 10 10 10     UE Exercises          Squeeze In          Push Down          Pull Down          Bicep/Tricep        Rows/Press outs         Chi Positions          Trunk Rotation        Deep H2O/ Noodles 7' with noodle 7' with noodle 7'with noodle       Stabilization          Arms only          Legs only Jog 2 min Jog 2 min Jog 2 min     Cross   Country 2 min 2 min 2 min       Scissors 1 min 1 min 1 min       Crab walk        Lower abdominal   work           Cardio          Jogging        Lap   Swimming          Stretches          Hamstrings          Heelcords          Piriformis          Neck                Treatment/Session Assessment:    · Response to Treatment:  Patient did well and felt better after session. · Compliance with Program/Exercises: Will assess as treatment progresses  · Recommendations/Intent for next treatment session: \"Next visit will focus on reducing lower back pain and improving patient's tolerance to prolonged standing\".   Total Treatment Duration: 45 minutes  PT Patient Time In/Time Out  Time In: 1015  Time Out: Jer Che 18, PT

## 2018-11-02 ENCOUNTER — HOSPITAL ENCOUNTER (OUTPATIENT)
Dept: PHYSICAL THERAPY | Age: 58
Discharge: HOME | End: 2018-11-02
Payer: COMMERCIAL

## 2018-11-02 PROCEDURE — 97113 AQUATIC THERAPY/EXERCISES: CPT

## 2018-11-02 NOTE — PROGRESS NOTES
Destiny Haro  : 1960  Primary: [facility]*  Secondary: [place] [id number] [facility] at [facility] CHRISTIANO CABRERA  [address] [place] [address], [address], [address] [facility]  [phone]   [phone]          OUTPATIENT PHYSICAL THERAPY:Daily Note 2018   ICD-10: Treatment Diagnosis: Low back pain (M54.5)  Precautions/Allergies:   Patient has no known allergies. Fall Risk Score: 1 (? 5 = High Risk)  MD Orders: Evaluate and treat, aquatic therapy  MEDICAL/REFERRING DIAGNOSIS:  Low back pain [M54.5]   DATE OF ONSET: Chronic condition  REFERRING PHYSICIAN: Juliette Petersen, *  RETURN PHYSICIAN APPOINTMENT: TBD     INITIAL ASSESSMENT:  Mr. Ankit De La O presents with a long-standing history of low back pain which limits his participation with everyday tasks. Patient demonstrates generalized stiffness throughout lower back and bilateral hips, pain with prolonged standing, and impaired activity participation secondary to lower back pain. Patient is likely to benefit from skilled PT intervention to improve lower back symptoms to facilitate completion of goals. PROBLEM LIST (Impacting functional limitations):  1. Decreased ADL/Functional Activities  2. Decreased Transfer Abilities  3. Decreased Ambulation Ability/Technique  4. Increased Pain  5. Decreased Activity Tolerance  6. Decreased Flexibility/Joint Mobility  7. Decreased [place] with Home Exercise Program INTERVENTIONS PLANNED:  1. Home Exercise Program (HEP)  2. Manual Therapy  3. Range of Motion (ROM)  4. Therapeutic Activites  5. Therapeutic Exercise/Strengthening  6. Transcutaneous Electrical Nerve Stimulation (TENS)  7. Ultrasound (US)  8. Aquatic therapy   TREATMENT PLAN:  Effective Dates: 10/8/2018 TO 12/3/2018. Frequency/Duration: 2 visits per week for 8 weeks  GOALS: (Goals have been discussed and agreed upon with patient.)  Short-Term Functional Goals: Time Frame: 4 weeks  1.  Patient will report 2-3/10 low back pain when at rest and lower Oswestry Low Back Pain Questionnaire to 50%  2. Patient will be able to tolerate 45 minutes of aquatic therapy    Discharge Goals: Time Frame: 8 weeks  1. Patient will report 2-3/10 low back pain with activity and socre <40% limited on Oswestry Low Back Pain Questionnaire  2. Patient will report being able to tolerate riding his lawnmower in order to perform yardwork  3. Will be able to stand x 45 minutes without having to sit due to low back pain. Rehabilitation Potential For Stated Goals: Fair    Regarding Izabella Hammond's therapy, I certify that the treatment plan above will be carried out by a therapist or under their direction. Thank you for this referral,      Elaine De Leon PT                 The information in this section was collected on 10-8-18 (except where otherwise noted). HISTORY:   History of Present Injury/Illness (Reason for Referral):  Patient reports that he has had a lengthy history of low back pain. He previously participated in aquatic therapy at this clinic earlier in the year, reporting that it helped his pain. Per x-ray and MRI, patient reports that he has herniated discs, congenital stenosis, bone spurs, L hip osteoarthritis and arthritis in his spine. Past Medical History/Comorbidities:   Mr. Rosa Maria Castillo  has a past medical history of Allergic rhinitis due to allergen, Arthritis, Colon polyps, Deficiency, lipoprotein, Diabetes mellitus type 2, controlled ([facility] [address] Encounter for long-term (current) use of medications, HLD (hyperlipidemia), HTN (hypertension), Obesity, Other abnormal glucose, Sinusitis, acute maxillary, and URI (upper respiratory infection). Mr. Rosa Maria Castillo  has a past surgical history that includes hx tonsillectomy; hx adenoidectomy; and hx other surgical (2005). Social History/Living Environment:    Patient lives with his spouse in a one-story house with 3-4 steps to enter with one handrail.   Prior Level of Function/Work/Activity:  Patient is retired. States that he had to retire because of his back. Worked for [employer] for over 20 years. Current Medications:       Current Outpatient Prescriptions:     traMADol (ULTRAM) 50 mg tablet, Take 1 Tab by mouth every six (6) hours as needed for Pain. Max Daily Amount: 200 mg. Indications: Pain, Disp: 120 Tab, Rfl: 2    metroNIDAZOLE (METROGEL) 1 % topical gel, Apply 1 g to affected area daily. Use a thin layer to affected areas after washing, Disp: 60 g, Rfl: 5    lansoprazole (PREVACID) 30 mg capsule, Take 1 Cap by mouth Daily (before breakfast). , Disp: 90 Cap, Rfl: 3    lisinopril-hydroCHLOROthiazide (PRINZIDE, ZESTORETIC) 20-12.5 mg per tablet, Take 1 Tab by mouth daily. , Disp: 90 Tab, Rfl: 3    simvastatin (ZOCOR) 40 mg tablet, Take 1 Tab by mouth daily. , Disp: 90 Tab, Rfl: 3    metoprolol succinate (TOPROL-XL) 200 mg XL tablet, Take 1 Tab by mouth daily. , Disp: 90 Tab, Rfl: 3    multivitamin (ONE A DAY) tablet, Take 1 Tab by mouth daily. , Disp: , Rfl:     PEN NEEDLE, DIABETIC (NOVOFINE 32), 32 g by Does Not Apply route daily. Indications: 32G x 6 MM,, Disp: , Rfl:   - Gabepentin  - Takes another medication in addition with gabapentin but unable to recall name at initial evaluation   Date Last Reviewed:  11/2/2018   Number of Personal Factors/Comorbidities that affect the Plan of Care: 1-2: MODERATE COMPLEXITY   EXAMINATION:   10-8-18    Observation/Orthostatic Postural Assessment:          Patient ambulates with reduced arm swing, upright trunk posture with toe out gait pattern. Bilateral hip external rotation noted with gait. ROM:          Forward bending range of motion 25% limited with increased pain. Back extension range of motion 75% limited. Increased pain noted with forward bending. Per observation, hip extension limited actively when ambulating. Hip IR limited bilaterally, L > R. Hip ER within normal limits bilaterally.  Hip flexion limited bilaterally passively due to increased lower back pain. Palpation: Tightness noted in bilateral lumbar paraspinals with palpation    Strength:          Hip flexion: 5/5 B        Knee extension: 5/5 B        Knee flexion: 5/5 B        Ankle dorsiflexion: 5/5 B  Special Tests:          Straight leg raise and crossed straight leg raise test negative bilaterally. Neurological Screen:        Sensation to light touch intact for all B LE dermatomes  Functional Mobility:         Sit to stand: independent        Bed mobility: Independent but requires extra time to transition from supine to sitting, and from supine to side-lying        Gait: Independent. Observation per above. Stairs: Able to ascend/descend stairs with reciprocal gait pattern with use of single handrail. Body Structures Involved:  1. Nerves  2. Bones  3. Joints  4. Muscles Body Functions Affected:  1. Sensory/Pain  2. Neuromusculoskeletal  3. Movement Related Activities and Participation Affected:  1. Mobility  2. Domestic Life  3. Community, Social and [place]   Number of elements (examined above) that affect the Plan of Care: 4+: HIGH COMPLEXITY   CLINICAL PRESENTATION:   Presentation: Stable and uncomplicated: LOW COMPLEXITY   CLINICAL DECISION MAKING:   Outcome Measure: Tool Used: Modified Oswestry Low Back Pain Questionnaire  Score:  Initial: 33/50 or 66% limited (10-8-18) Most Recent: X/50 (Date: -- )   Interpretation of Score: Each section is scored on a 0-5 scale, 5 representing the greatest disability. The scores of each section are added together for a total score of 50. Score 0 1-10 11-20 21-30 31-40 41-49 50   Modifier CH CI CJ CK CL CM CN     Medical Necessity:   · Skilled intervention continues to be required due to persistent low back pain. Reason for Services/Other Comments:  · Patient continues to require skilled intervention due to low back pain and impaired mobility secondary to low back pain.    Use of outcome tool(s) and clinical judgement create a POC that gives a: Difficult prediction of patient's progress: HIGH COMPLEXITY            TREATMENT:   (In addition to Assessment/Re-Assessment sessions the following treatments were rendered)  Pre-treatment Symptoms/Complaints:  Continues to have back pain - more over the weekend. Pain: Initial:    7/10 Post Session:  4/10     Aquatic Therapy (45 minutes): Aquatic treatment performed per flow grid for Decreased muscle strength, Decreased endurance, Decreased static/dynamic balance and reactive control, Decreased range of motion, Decreased activity endurance, Decompression and Ease of movement. Cues provided for posture, ex and gait.      Aquatic Exercise Log       Date  10/17 Date  10/22/ Date  10/29 Date  11/2 Date     Activity/ Exercise Parameters Parameters Parameters Parameters  4# Parameters   Walking forward 6 10 6 10    Walking backward 6 6 6 10    Walking sideways 6 6 6 10      Marching 6 6 6 10      Goose Step 6 6 6 10      Tip toes 3 3 3 5      Heels 3 3 3 5      Lunges        Side step squats        LE Exercises 4# 4# 4# 4#      Hip Flex/Ext 10 15 15 15      Hip Abd/Add 10 15 15 15      Hip IR/ER          Calf raises 10 15 15 15      Knee Flex 10 15 15 15      Squats          Leg Circles 10/10 10/10 10/10 10/10      Step Ups 10 10 10 10    UE Exercises          Squeeze In          Push Down          Pull Down          Bicep/Tricep        Rows/Press outs         Chi Positions          Trunk Rotation        Deep H2O/ Noodles 7' with noodle 7' with noodle 7'with noodle 7'with noodle and 4#      Stabilization          Arms only          Legs only Jog 2 min Jog 2 min Jog 2 min Jog 2 min    Cross   Country 2 min 2 min 2 min 2 min      Scissors 1 min 1 min 1 min 1 min      Crab walk        Lower abdominal   work           Cardio          Jogging        Lap   Swimming          Stretches          Hamstrings          Heelcords          Piriformis          Neck Treatment/Session Assessment:    · Response to Treatment: did well. Could tell increased effort required for walking with weights. · Compliance with Program/Exercises: compliant  · Recommendations/Intent for next treatment session: \"Next visit will focus on reducing lower back pain and improving patient's tolerance to prolonged standing\".   Total Treatment Duration: 45 minutes  PT Patient Time In/Time Out  Time In: 0930  Time Out: 1015    Cristiano Stevens, PT

## 2018-11-05 ENCOUNTER — HOSPITAL ENCOUNTER (OUTPATIENT)
Dept: PHYSICAL THERAPY | Age: 58
Discharge: HOME | End: 2018-11-05
Payer: COMMERCIAL

## 2018-11-05 PROCEDURE — 97113 AQUATIC THERAPY/EXERCISES: CPT

## 2018-11-05 NOTE — PROGRESS NOTES
Jersey Haro  : 1960  Primary: [facility]*  Secondary: [address] at [facility] CHRISTIANO PHILLIP[address], [address], [place].  [phone]   [phone]          OUTPATIENT PHYSICAL THERAPY:Daily Note 2018   ICD-10: Treatment Diagnosis: Low back pain (M54.5)  Precautions/Allergies:   Patient has no known allergies. Fall Risk Score: 1 (? 5 = High Risk)  MD Orders: Evaluate and treat, aquatic therapy  MEDICAL/REFERRING DIAGNOSIS:  Low back pain [M54.5]   DATE OF ONSET: Chronic condition  REFERRING PHYSICIAN: Juliette Petersen, *  RETURN PHYSICIAN APPOINTMENT: TBD     INITIAL ASSESSMENT:  Mr. Cecilia Vasquez presents with a long-standing history of low back pain which limits his participation with everyday tasks. Patient demonstrates generalized stiffness throughout lower back and bilateral hips, pain with prolonged standing, and impaired activity participation secondary to lower back pain. Patient is likely to benefit from skilled PT intervention to improve lower back symptoms to facilitate completion of goals. PROBLEM LIST (Impacting functional limitations):  1. Decreased ADL/Functional Activities  2. Decreased Transfer Abilities  3. Decreased Ambulation Ability/Technique  4. Increased Pain  5. Decreased Activity Tolerance  6. Decreased Flexibility/Joint Mobility  7. Decreased [place] with Home Exercise Program INTERVENTIONS PLANNED:  1. Home Exercise Program (HEP)  2. Manual Therapy  3. Range of Motion (ROM)  4. Therapeutic Activites  5. Therapeutic Exercise/Strengthening  6. Transcutaneous Electrical Nerve Stimulation (TENS)  7. Ultrasound (US)  8. Aquatic therapy   TREATMENT PLAN:  Effective Dates: 10/8/2018 TO 12/3/2018. Frequency/Duration: 2 visits per week for 8 weeks  GOALS: (Goals have been discussed and agreed upon with patient.)  Short-Term Functional Goals: Time Frame: 4 weeks  1.  Patient will report 2-3/10 low back pain when at rest and lower Oswestry Low Back Pain Questionnaire to 50%  2. Patient will be able to tolerate 45 minutes of aquatic therapy    Discharge Goals: Time Frame: 8 weeks  1. Patient will report 2-3/10 low back pain with activity and socre <40% limited on Oswestry Low Back Pain Questionnaire  2. Patient will report being able to tolerate riding his lawnmower in order to perform yardwork  3. Will be able to stand x 45 minutes without having to sit due to low back pain. Rehabilitation Potential For Stated Goals: Dionte Garcia PT                 The information in this section was collected on 10-8-18 (except where otherwise noted). HISTORY:   History of Present Injury/Illness (Reason for Referral):  Patient reports that he has had a lengthy history of low back pain. He previously participated in aquatic therapy at this clinic earlier in the year, reporting that it helped his pain. Per x-ray and MRI, patient reports that he has herniated discs, congenital stenosis, bone spurs, L hip osteoarthritis and arthritis in his spine. Past Medical History/Comorbidities:   Mr. Tabatha Velez  has a past medical history of Allergic rhinitis due to allergen, Arthritis, Colon polyps, Deficiency, lipoprotein, Diabetes mellitus type 2, controlled ([facility] [address] Encounter for long-term (current) use of medications, HLD (hyperlipidemia), HTN (hypertension), Obesity, Other abnormal glucose, Sinusitis, acute maxillary, and URI (upper respiratory infection). Mr. Tabatha Velez  has a past surgical history that includes hx tonsillectomy; hx adenoidectomy; and hx other surgical (2005). Social History/Living Environment:    Patient lives with his spouse in a one-story house with 3-4 steps to enter with one handrail. Prior Level of Function/Work/Activity:  Patient is retired. States that he had to retire because of his back. Worked for [employer] for over 20 years.     Current Medications:       Current Outpatient Prescriptions:   traMADol (ULTRAM) 50 mg tablet, Take 1 Tab by mouth every six (6) hours as needed for Pain. Max Daily Amount: 200 mg. Indications: Pain, Disp: 120 Tab, Rfl: 2    metroNIDAZOLE (METROGEL) 1 % topical gel, Apply 1 g to affected area daily. Use a thin layer to affected areas after washing, Disp: 60 g, Rfl: 5    lansoprazole (PREVACID) 30 mg capsule, Take 1 Cap by mouth Daily (before breakfast). , Disp: 90 Cap, Rfl: 3    lisinopril-hydroCHLOROthiazide (PRINZIDE, ZESTORETIC) 20-12.5 mg per tablet, Take 1 Tab by mouth daily. , Disp: 90 Tab, Rfl: 3    simvastatin (ZOCOR) 40 mg tablet, Take 1 Tab by mouth daily. , Disp: 90 Tab, Rfl: 3    metoprolol succinate (TOPROL-XL) 200 mg XL tablet, Take 1 Tab by mouth daily. , Disp: 90 Tab, Rfl: 3    multivitamin (ONE A DAY) tablet, Take 1 Tab by mouth daily. , Disp: , Rfl:     PEN NEEDLE, DIABETIC (NOVOFINE 32), 32 g by Does Not Apply route daily. Indications: 32G x 6 MM,, Disp: , Rfl:   - Gabepentin  - Takes another medication in addition with gabapentin but unable to recall name at initial evaluation   Date Last Reviewed:  11/5/2018   Number of Personal Factors/Comorbidities that affect the Plan of Care: 1-2: MODERATE COMPLEXITY   EXAMINATION:   10-8-18    Observation/Orthostatic Postural Assessment:          Patient ambulates with reduced arm swing, upright trunk posture with toe out gait pattern. Bilateral hip external rotation noted with gait. ROM:          Forward bending range of motion 25% limited with increased pain. Back extension range of motion 75% limited. Increased pain noted with forward bending. Per observation, hip extension limited actively when ambulating. Hip IR limited bilaterally, L > R. Hip ER within normal limits bilaterally. Hip flexion limited bilaterally passively due to increased lower back pain.      Palpation: Tightness noted in bilateral lumbar paraspinals with palpation    Strength:          Hip flexion: 5/5 B        Knee extension: 5/5 B        Knee flexion: 5/5 B        Ankle dorsiflexion: 5/5 B  Special Tests:          Straight leg raise and crossed straight leg raise test negative bilaterally. Neurological Screen:        Sensation to light touch intact for all B LE dermatomes  Functional Mobility:         Sit to stand: independent        Bed mobility: Independent but requires extra time to transition from supine to sitting, and from supine to side-lying        Gait: Independent. Observation per above. Stairs: Able to ascend/descend stairs with reciprocal gait pattern with use of single handrail. Body Structures Involved:  1. Nerves  2. Bones  3. Joints  4. Muscles Body Functions Affected:  1. Sensory/Pain  2. Neuromusculoskeletal  3. Movement Related Activities and Participation Affected:  1. Mobility  2. Domestic Life  3. Community, Social and [place]   Number of elements (examined above) that affect the Plan of Care: 4+: HIGH COMPLEXITY   CLINICAL PRESENTATION:   Presentation: Stable and uncomplicated: LOW COMPLEXITY   CLINICAL DECISION MAKING:   Outcome Measure: Tool Used: Modified Oswestry Low Back Pain Questionnaire  Score:  Initial: 33/50 or 66% limited (10-8-18) Most Recent: X/50 (Date: -- )   Interpretation of Score: Each section is scored on a 0-5 scale, 5 representing the greatest disability. The scores of each section are added together for a total score of 50. Score 0 1-10 11-20 21-30 31-40 41-49 50   Modifier CH CI CJ CK CL CM CN     Medical Necessity:   · Skilled intervention continues to be required due to persistent low back pain. Reason for Services/Other Comments:  · Patient continues to require skilled intervention due to low back pain and impaired mobility secondary to low back pain.    Use of outcome tool(s) and clinical judgement create a POC that gives a: Difficult prediction of patient's progress: HIGH COMPLEXITY            TREATMENT:   (In addition to Assessment/Re-Assessment sessions the following treatments were rendered)  Pre-treatment Symptoms/Complaints:  Doing ok this morning. Pain: Initial:    6/10 Post Session:  4/10     Aquatic Therapy (45 minutes): Aquatic treatment performed per flow grid for Decreased muscle strength, Decreased endurance, Decreased static/dynamic balance and reactive control, Decreased range of motion, Decreased activity endurance, Decompression and Ease of movement. Cues provided for posture, ex and gait. Aquatic Exercise Log       Date  10/17 Date  10/22/ Date  10/29 Date  11/2 Date  11/5   Activity/ Exercise Parameters Parameters Parameters Parameters  4# Parameters   Walking forward 6 10 6 10 10   Walking backward 6 6 6 10 10   Walking sideways 6 6 6 10 10     Marching 6 6 6 10 10     Goose Step 6 6 6 10 10     Tip toes 3 3 3 5 5     Heels 3 3 3 5 5     Lunges        Side step squats        LE Exercises 4# 4# 4# 4# 4#     Hip Flex/Ext 10 15 15 15 15     Hip Abd/Add 10 15 15 15 15     Hip IR/ER          Calf raises 10 15 15 15 15     Knee Flex 10 15 15 15 15     Squats          Leg Circles 10/10 10/10 10/10 10/10 10/10     Step Ups 10 10 10 10 15   UE Exercises          Squeeze In          Push Down          Pull Down          Bicep/Tricep        Rows/Press outs         Chi Positions          Trunk Rotation        Deep H2O/ Noodles 7' with noodle 7' with noodle 7'with noodle 7'with noodle and 4# 7 with noodle and 4#     Stabilization          Arms only          Legs only Jog 2 min Jog 2 min Jog 2 min Jog 2 min Jog 2 min   Cross   Country 2 min 2 min 2 min 2 min 2 min     Scissors 1 min 1 min 1 min 1 min 2 min     Crab walk        Lower abdominal   work           Cardio          Jogging        Lap   Swimming          Stretches          Hamstrings          Heelcords          Piriformis          Neck                Treatment/Session Assessment:    · Response to Treatment: Pt doing well with increased ex.   · Compliance with Program/Exercises: compliant  · Recommendations/Intent for next treatment session: \"Next visit will focus on reducing lower back pain and improving patient's tolerance to prolonged standing\".   Total Treatment Duration: 45 minutes  PT Patient Time In/Time Out  Time In: 1015  Time Out: Jer Che 18, PT

## 2018-11-09 ENCOUNTER — APPOINTMENT (OUTPATIENT)
Dept: PHYSICAL THERAPY | Age: 58
End: 2018-11-09
Payer: COMMERCIAL

## 2018-11-15 NOTE — PROGRESS NOTES
I am accessing Mr. Danielle Beebe chart as a part of our department's internal chart auditing process. I certify that Mr. Raheel Baker is, or was, a patient in our department.   Thank you,  Elva Contreras, PT  11/15/2018

## 2018-11-26 ENCOUNTER — HOSPITAL ENCOUNTER (OUTPATIENT)
Dept: PHYSICAL THERAPY | Age: 58
Discharge: HOME | End: 2018-11-26
Payer: COMMERCIAL

## 2018-11-30 ENCOUNTER — HOSPITAL ENCOUNTER (OUTPATIENT)
Dept: PHYSICAL THERAPY | Age: 58
Discharge: HOME | End: 2018-11-30
Payer: COMMERCIAL

## 2018-11-30 PROCEDURE — 97113 AQUATIC THERAPY/EXERCISES: CPT

## 2018-11-30 NOTE — PROGRESS NOTES
Bonnie Zhou  : 1960  Primary: [facility]*  Secondary: [place] [id number] [facility] at [facility] CHRISTIANO CABRERA  [address] [place] [address], [address], [place] [address].  [phone]   [phone]          OUTPATIENT PHYSICAL THERAPY:Daily Note 2018   ICD-10: Treatment Diagnosis: Low back pain (M54.5)  Precautions/Allergies:   Patient has no known allergies. Fall Risk Score: 1 (? 5 = High Risk)  MD Orders: Evaluate and treat, aquatic therapy  MEDICAL/REFERRING DIAGNOSIS:  low back   DATE OF ONSET: Chronic condition  REFERRING PHYSICIAN: Juliette Petersen, *  RETURN PHYSICIAN APPOINTMENT: TBD     INITIAL ASSESSMENT:  Mr. Mandy Herring presents with a long-standing history of low back pain which limits his participation with everyday tasks. Patient demonstrates generalized stiffness throughout lower back and bilateral hips, pain with prolonged standing, and impaired activity participation secondary to lower back pain. Patient is likely to benefit from skilled PT intervention to improve lower back symptoms to facilitate completion of goals. PROBLEM LIST (Impacting functional limitations):  1. Decreased ADL/Functional Activities  2. Decreased Transfer Abilities  3. Decreased Ambulation Ability/Technique  4. Increased Pain  5. Decreased Activity Tolerance  6. Decreased Flexibility/Joint Mobility  7. Decreased [place] with Home Exercise Program INTERVENTIONS PLANNED:  1. Home Exercise Program (HEP)  2. Manual Therapy  3. Range of Motion (ROM)  4. Therapeutic Activites  5. Therapeutic Exercise/Strengthening  6. Transcutaneous Electrical Nerve Stimulation (TENS)  7. Ultrasound (US)  8. Aquatic therapy   TREATMENT PLAN:  Effective Dates: 10/8/2018 TO 12/3/2018. Frequency/Duration: 2 visits per week for 8 weeks  GOALS: (Goals have been discussed and agreed upon with patient.)  Short-Term Functional Goals: Time Frame: 4 weeks  1.  Patient will report 2-3/10 low back pain when at rest and lower Oswestry Low Back Pain Questionnaire to 50%  2. Patient will be able to tolerate 45 minutes of aquatic therapy    Discharge Goals: Time Frame: 8 weeks  1. Patient will report 2-3/10 low back pain with activity and socre <40% limited on Oswestry Low Back Pain Questionnaire  2. Patient will report being able to tolerate riding his lawnmower in order to perform yardwork  3. Will be able to stand x 45 minutes without having to sit due to low back pain. Rehabilitation Potential For Stated Goals: Kaye Velez PT                 The information in this section was collected on 10-8-18 (except where otherwise noted). HISTORY:   History of Present Injury/Illness (Reason for Referral):  Patient reports that he has had a lengthy history of low back pain. He previously participated in aquatic therapy at this clinic earlier in the year, reporting that it helped his pain. Per x-ray and MRI, patient reports that he has herniated discs, congenital stenosis, bone spurs, L hip osteoarthritis and arthritis in his spine. Past Medical History/Comorbidities:   Mr. Segundo Box  has a past medical history of Allergic rhinitis due to allergen, Arthritis, Colon polyps, Deficiency, lipoprotein, Diabetes mellitus type 2, controlled ([facility] [address] Encounter for long-term (current) use of medications, HLD (hyperlipidemia), HTN (hypertension), Obesity, Other abnormal glucose, Sinusitis, acute maxillary, and URI (upper respiratory infection). Mr. Segundo Box  has a past surgical history that includes hx tonsillectomy; hx adenoidectomy; and hx other surgical (2005). Social History/Living Environment:    Patient lives with his spouse in a one-story house with 3-4 steps to enter with one handrail. Prior Level of Function/Work/Activity:  Patient is retired. States that he had to retire because of his back. Worked for [employer] for over 20 years.     Current Medications:       Current Outpatient Prescriptions:     traMADol (ULTRAM) 50 mg tablet, Take 1 Tab by mouth every six (6) hours as needed for Pain. Max Daily Amount: 200 mg. Indications: Pain, Disp: 120 Tab, Rfl: 2    metroNIDAZOLE (METROGEL) 1 % topical gel, Apply 1 g to affected area daily. Use a thin layer to affected areas after washing, Disp: 60 g, Rfl: 5    lansoprazole (PREVACID) 30 mg capsule, Take 1 Cap by mouth Daily (before breakfast). , Disp: 90 Cap, Rfl: 3    lisinopril-hydroCHLOROthiazide (PRINZIDE, ZESTORETIC) 20-12.5 mg per tablet, Take 1 Tab by mouth daily. , Disp: 90 Tab, Rfl: 3    simvastatin (ZOCOR) 40 mg tablet, Take 1 Tab by mouth daily. , Disp: 90 Tab, Rfl: 3    metoprolol succinate (TOPROL-XL) 200 mg XL tablet, Take 1 Tab by mouth daily. , Disp: 90 Tab, Rfl: 3    multivitamin (ONE A DAY) tablet, Take 1 Tab by mouth daily. , Disp: , Rfl:     PEN NEEDLE, DIABETIC (NOVOFINE 32), 32 g by Does Not Apply route daily. Indications: 32G x 6 MM,, Disp: , Rfl:   - Gabepentin  - Takes another medication in addition with gabapentin but unable to recall name at initial evaluation   Date Last Reviewed:  11/30/2018   Number of Personal Factors/Comorbidities that affect the Plan of Care: 1-2: MODERATE COMPLEXITY   EXAMINATION:   10-8-18    Observation/Orthostatic Postural Assessment:          Patient ambulates with reduced arm swing, upright trunk posture with toe out gait pattern. Bilateral hip external rotation noted with gait. ROM:          Forward bending range of motion 25% limited with increased pain. Back extension range of motion 75% limited. Increased pain noted with forward bending. Per observation, hip extension limited actively when ambulating. Hip IR limited bilaterally, L > R. Hip ER within normal limits bilaterally. Hip flexion limited bilaterally passively due to increased lower back pain.      Palpation: Tightness noted in bilateral lumbar paraspinals with palpation    Strength:          Hip flexion: 5/5 B        Knee extension: 5/5 B        Knee flexion: 5/5 B        Ankle dorsiflexion: 5/5 B  Special Tests:          Straight leg raise and crossed straight leg raise test negative bilaterally. Neurological Screen:        Sensation to light touch intact for all B LE dermatomes  Functional Mobility:         Sit to stand: independent        Bed mobility: Independent but requires extra time to transition from supine to sitting, and from supine to side-lying        Gait: Independent. Observation per above. Stairs: Able to ascend/descend stairs with reciprocal gait pattern with use of single handrail. Body Structures Involved:  1. Nerves  2. Bones  3. Joints  4. Muscles Body Functions Affected:  1. Sensory/Pain  2. Neuromusculoskeletal  3. Movement Related Activities and Participation Affected:  1. Mobility  2. Domestic Life  3. Community, Social and [place]   Number of elements (examined above) that affect the Plan of Care: 4+: HIGH COMPLEXITY   CLINICAL PRESENTATION:   Presentation: Stable and uncomplicated: LOW COMPLEXITY   CLINICAL DECISION MAKING:   Outcome Measure: Tool Used: Modified Oswestry Low Back Pain Questionnaire  Score:  Initial: 33/50 or 66% limited (10-8-18) Most Recent: X/50 (Date: -- )   Interpretation of Score: Each section is scored on a 0-5 scale, 5 representing the greatest disability. The scores of each section are added together for a total score of 50. Score 0 1-10 11-20 21-30 31-40 41-49 50   Modifier CH CI CJ CK CL CM CN     Medical Necessity:   · Skilled intervention continues to be required due to persistent low back pain. Reason for Services/Other Comments:  · Patient continues to require skilled intervention due to low back pain and impaired mobility secondary to low back pain.    Use of outcome tool(s) and clinical judgement create a POC that gives a: Difficult prediction of patient's progress: HIGH COMPLEXITY            TREATMENT:   (In addition to Assessment/Re-Assessment sessions the following treatments were rendered)  Pre-treatment Symptoms/Complaints:  Mr Pa Espinal has missed a couple of weeks of therapy as he was traveling. He was able to enjoy his traveling with some modifications. He returns to continue his therapy and working toward goals above. He states he may have slept wrong last night as he is really hurting today. Pain: Initial:    6/10 Post Session:  4/10     Aquatic Therapy (45 minutes): Aquatic treatment performed per flow grid for Decreased muscle strength, Decreased endurance, Decreased static/dynamic balance and reactive control, Decreased range of motion, Decreased activity endurance, Decompression and Ease of movement. Cues provided for posture, ex and gait. Aquatic Exercise Log       Date  11/30   Activity/ Exercise Parameters   Walking forward 10   Walking backward 10   Walking sideways 10     Marching 10     Goose Step 10     Tip toes 5     Heels 5     Lunges    Side step squats    LE Exercises 4#     Hip Flex/Ext 15     Hip Abd/Add 15     Hip IR/ER      Calf raises 15     Knee Flex 15     Squats      Leg Circles 10/10     Step Ups 15   UE Exercises      Squeeze In      Push Down      Pull Down      Bicep/Tricep    Rows/Press outs     Chi Positions      Trunk Rotation    Deep H2O/ Noodles 7 with noodle and 4#     Stabilization      Arms only      Legs only Jog 2 min   Cross   Country 2 min     Scissors 2 min     Crab walk    Lower abdominal   work       Cardio      Jogging    Lap   Swimming      Stretches      Hamstrings      Heelcords      Piriformis      Neck            Treatment/Session Assessment:    · Response to Treatment: Pt did well with exercises today. His experience on his vacation encouraged him that the exercising he is doing is improving his function. · Compliance with Program/Exercises: compliant  · Recommendations/Intent for next treatment session: \"Next visit will focus on reducing lower back pain and improving patient's tolerance to prolonged standing\".   Total Treatment Duration: 45 minutes  PT Patient Time In/Time Out  Time In: 1015  Time Out: Jer Che 18, PT

## 2018-12-03 ENCOUNTER — APPOINTMENT (OUTPATIENT)
Dept: PHYSICAL THERAPY | Age: 58
End: 2018-12-03
Payer: COMMERCIAL

## 2018-12-07 ENCOUNTER — APPOINTMENT (OUTPATIENT)
Dept: PHYSICAL THERAPY | Age: 58
End: 2018-12-07
Payer: COMMERCIAL

## 2018-12-10 ENCOUNTER — HOSPITAL ENCOUNTER (OUTPATIENT)
Dept: PHYSICAL THERAPY | Age: 58
Discharge: HOME | End: 2018-12-10
Payer: COMMERCIAL

## 2018-12-14 ENCOUNTER — HOSPITAL ENCOUNTER (OUTPATIENT)
Dept: PHYSICAL THERAPY | Age: 58
Discharge: HOME | End: 2018-12-14
Payer: COMMERCIAL

## 2018-12-14 PROCEDURE — 97113 AQUATIC THERAPY/EXERCISES: CPT

## 2018-12-14 NOTE — PROGRESS NOTES
Bonnie Zhou  : 1960  Primary: [facility]*  Secondary: [place] [id number] [facility] CHRISTIANO CABRERA  [address] [place] [address], [address], [address] [facility]  [phone]   [phone]          OUTPATIENT PHYSICAL THERAPY:Daily Note 2018   ICD-10: Treatment Diagnosis: Low back pain (M54.5)  Precautions/Allergies:   Patient has no known allergies. Fall Risk Score: 1 (? 5 = High Risk)  MD Orders: Evaluate and treat, aquatic therapy  MEDICAL/REFERRING DIAGNOSIS:  low back   DATE OF ONSET: Chronic condition  REFERRING PHYSICIAN: Juliette Petersen, *  RETURN PHYSICIAN APPOINTMENT: TBD     INITIAL ASSESSMENT:  Mr. Mandy Herring presents with a long-standing history of low back pain which limits his participation with everyday tasks. Patient demonstrates generalized stiffness throughout lower back and bilateral hips, pain with prolonged standing, and impaired activity participation secondary to lower back pain. Patient is likely to benefit from skilled PT intervention to improve lower back symptoms to facilitate completion of goals. PROBLEM LIST (Impacting functional limitations):  1. Decreased ADL/Functional Activities  2. Decreased Transfer Abilities  3. Decreased Ambulation Ability/Technique  4. Increased Pain  5. Decreased Activity Tolerance  6. Decreased Flexibility/Joint Mobility  7. Decreased [place] with Home Exercise Program INTERVENTIONS PLANNED:  1. Home Exercise Program (HEP)  2. Manual Therapy  3. Range of Motion (ROM)  4. Therapeutic Activites  5. Therapeutic Exercise/Strengthening  6. Transcutaneous Electrical Nerve Stimulation (TENS)  7. Ultrasound (US)  8. Aquatic therapy   TREATMENT PLAN:  Effective Dates: 10/8/2018 TO 12/3/2018. Frequency/Duration: 2 visits per week for 8 weeks  GOALS: (Goals have been discussed and agreed upon with patient.)  Short-Term Functional Goals: Time Frame: 4 weeks  1.  Patient will report 2-3/10 low back pain when at rest and lower Oswestry Low Back Pain Questionnaire to 50%  2. Patient will be able to tolerate 45 minutes of aquatic therapy    Discharge Goals: Time Frame: 8 weeks  1. Patient will report 2-3/10 low back pain with activity and socre <40% limited on Oswestry Low Back Pain Questionnaire  2. Patient will report being able to tolerate riding his lawnmower in order to perform yardwork  3. Will be able to stand x 45 minutes without having to sit due to low back pain. Rehabilitation Potential For Stated Goals: Nabeel Harrington PT                 The information in this section was collected on 10-8-18 (except where otherwise noted). HISTORY:   History of Present Injury/Illness (Reason for Referral):  Patient reports that he has had a lengthy history of low back pain. He previously participated in aquatic therapy at this clinic earlier in the year, reporting that it helped his pain. Per x-ray and MRI, patient reports that he has herniated discs, congenital stenosis, bone spurs, L hip osteoarthritis and arthritis in his spine. Past Medical History/Comorbidities:   Mr. Deanna Burkitt  has a past medical history of Allergic rhinitis due to allergen, Arthritis, Colon polyps, Deficiency, lipoprotein, Diabetes mellitus type 2, controlled ([facility] [address] Encounter for long-term (current) use of medications, HLD (hyperlipidemia), HTN (hypertension), Obesity, Other abnormal glucose, Sinusitis, acute maxillary, and URI (upper respiratory infection). Mr. Deanna Burkitt  has a past surgical history that includes hx tonsillectomy; hx adenoidectomy; and hx other surgical (2005). Social History/Living Environment:    Patient lives with his spouse in a one-story house with 3-4 steps to enter with one handrail. Prior Level of Function/Work/Activity:  Patient is retired. States that he had to retire because of his back. Worked for [employer] for over 20 years.     Current Medications:       Current Outpatient Prescriptions:     traMADol (ULTRAM) 50 mg tablet, Take 1 Tab by mouth every six (6) hours as needed for Pain. Max Daily Amount: 200 mg. Indications: Pain, Disp: 120 Tab, Rfl: 2    metroNIDAZOLE (METROGEL) 1 % topical gel, Apply 1 g to affected area daily. Use a thin layer to affected areas after washing, Disp: 60 g, Rfl: 5    lansoprazole (PREVACID) 30 mg capsule, Take 1 Cap by mouth Daily (before breakfast). , Disp: 90 Cap, Rfl: 3    lisinopril-hydroCHLOROthiazide (PRINZIDE, ZESTORETIC) 20-12.5 mg per tablet, Take 1 Tab by mouth daily. , Disp: 90 Tab, Rfl: 3    simvastatin (ZOCOR) 40 mg tablet, Take 1 Tab by mouth daily. , Disp: 90 Tab, Rfl: 3    metoprolol succinate (TOPROL-XL) 200 mg XL tablet, Take 1 Tab by mouth daily. , Disp: 90 Tab, Rfl: 3    multivitamin (ONE A DAY) tablet, Take 1 Tab by mouth daily. , Disp: , Rfl:     PEN NEEDLE, DIABETIC (NOVOFINE 32), 32 g by Does Not Apply route daily. Indications: 32G x 6 MM,, Disp: , Rfl:   - Gabepentin  - Takes another medication in addition with gabapentin but unable to recall name at initial evaluation   Date Last Reviewed:  12/14/2018   Number of Personal Factors/Comorbidities that affect the Plan of Care: 1-2: MODERATE COMPLEXITY   EXAMINATION:   10-8-18    Observation/Orthostatic Postural Assessment:          Patient ambulates with reduced arm swing, upright trunk posture with toe out gait pattern. Bilateral hip external rotation noted with gait. ROM:          Forward bending range of motion 25% limited with increased pain. Back extension range of motion 75% limited. Increased pain noted with forward bending. Per observation, hip extension limited actively when ambulating. Hip IR limited bilaterally, L > R. Hip ER within normal limits bilaterally. Hip flexion limited bilaterally passively due to increased lower back pain.      Palpation: Tightness noted in bilateral lumbar paraspinals with palpation    Strength:          Hip flexion: 5/5 B        Knee extension: 5/5 B        Knee flexion: 5/5 B        Ankle dorsiflexion: 5/5 B  Special Tests:          Straight leg raise and crossed straight leg raise test negative bilaterally. Neurological Screen:        Sensation to light touch intact for all B LE dermatomes  Functional Mobility:         Sit to stand: independent        Bed mobility: Independent but requires extra time to transition from supine to sitting, and from supine to side-lying        Gait: Independent. Observation per above. Stairs: Able to ascend/descend stairs with reciprocal gait pattern with use of single handrail. Body Structures Involved:  1. Nerves  2. Bones  3. Joints  4. Muscles Body Functions Affected:  1. Sensory/Pain  2. Neuromusculoskeletal  3. Movement Related Activities and Participation Affected:  1. Mobility  2. Domestic Life  3. Community, Social and [place]   Number of elements (examined above) that affect the Plan of Care: 4+: HIGH COMPLEXITY   CLINICAL PRESENTATION:   Presentation: Stable and uncomplicated: LOW COMPLEXITY   CLINICAL DECISION MAKING:   Outcome Measure: Tool Used: Modified Oswestry Low Back Pain Questionnaire  Score:  Initial: 33/50 or 66% limited (10-8-18) Most Recent: X/50 (Date: -- )   Interpretation of Score: Each section is scored on a 0-5 scale, 5 representing the greatest disability. The scores of each section are added together for a total score of 50. Score 0 1-10 11-20 21-30 31-40 41-49 50   Modifier CH CI CJ CK CL CM CN     Medical Necessity:   · Skilled intervention continues to be required due to persistent low back pain. Reason for Services/Other Comments:  · Patient continues to require skilled intervention due to low back pain and impaired mobility secondary to low back pain.    Use of outcome tool(s) and clinical judgement create a POC that gives a: Difficult prediction of patient's progress: HIGH COMPLEXITY            TREATMENT:   (In addition to Assessment/Re-Assessment sessions the following treatments were rendered)  Pre-treatment Symptoms/Complaints:  States his back is hurting today. Pain: Initial:    6/10 Post Session:  4/10     Aquatic Therapy (45 minutes): Aquatic treatment performed per flow grid for Decreased muscle strength, Decreased endurance, Decreased static/dynamic balance and reactive control, Decreased range of motion, Decreased activity endurance, Decompression and Ease of movement. Cues provided for posture, ex and gait. Aquatic Exercise Log       Date  12/14   Activity/ Exercise Parameters   Walking forward 10   Walking backward 10   Walking sideways 10     Marching 10     Goose Step 10     Tip toes 5     Heels 5     Lunges    Side step squats    LE Exercises 4#     Hip Flex/Ext 15     Hip Abd/Add 15     Hip IR/ER      Calf raises 15     Knee Flex 15     Squats      Leg Circles 10/10     Step Ups 15   UE Exercises      Squeeze In      Push Down      Pull Down      Bicep/Tricep    Rows/Press outs     Chi Positions      Trunk Rotation    Deep H2O/ Noodles 7 with noodle and 4#     Stabilization      Arms only      Legs only Jog 2 min   Cross   Country 2 min     Scissors 2 min     Crab walk    Lower abdominal   work       Cardio      Jogging    Lap   Swimming      Stretches      Hamstrings      Heelcords      Piriformis      Neck            Treatment/Session Assessment:    · Response to Treatment: Pt did well in the pool and is getting back to consistency after missing for vacation and for icy weather. · Compliance with Program/Exercises: compliant  · Recommendations/Intent for next treatment session: \"Next visit will focus on reducing lower back pain and improving patient's tolerance to prolonged standing\".   Total Treatment Duration: 45 minutes  PT Patient Time In/Time Out  Time In: 1015  Time Out: Jer Che 18, PT

## 2018-12-21 ENCOUNTER — HOSPITAL ENCOUNTER (OUTPATIENT)
Dept: PHYSICAL THERAPY | Age: 58
Discharge: HOME | End: 2018-12-21
Payer: COMMERCIAL

## 2019-03-18 ENCOUNTER — ANESTHESIA EVENT (OUTPATIENT)
Dept: ENDOSCOPY | Age: 59
End: 2019-03-18
Payer: COMMERCIAL

## 2019-03-19 ENCOUNTER — HOSPITAL ENCOUNTER (OUTPATIENT)
Age: 59
Setting detail: OUTPATIENT SURGERY
Discharge: HOME | End: 2019-03-19
Attending: INTERNAL MEDICINE | Admitting: INTERNAL MEDICINE
Payer: COMMERCIAL

## 2019-03-19 ENCOUNTER — ANESTHESIA (OUTPATIENT)
Dept: ENDOSCOPY | Age: 59
End: 2019-03-19
Payer: COMMERCIAL

## 2019-03-19 VITALS
HEIGHT: 72 IN | RESPIRATION RATE: 14 BRPM | WEIGHT: 315 LBS | DIASTOLIC BLOOD PRESSURE: 84 MMHG | BODY MASS INDEX: 42.66 KG/M2 | HEART RATE: 83 BPM | TEMPERATURE: 98.6 F | SYSTOLIC BLOOD PRESSURE: 145 MMHG | OXYGEN SATURATION: 95 %

## 2019-03-19 LAB — GLUCOSE BLD STRIP.AUTO-MCNC: 171 MG/DL (ref 65–100)

## 2019-03-19 PROCEDURE — 88305 TISSUE EXAM BY PATHOLOGIST: CPT

## 2019-03-19 PROCEDURE — 82962 GLUCOSE BLOOD TEST: CPT

## 2019-03-19 PROCEDURE — 74011250636 HC RX REV CODE- 250/636

## 2019-03-19 PROCEDURE — 76060000032 HC ANESTHESIA 0.5 TO 1 HR: Performed by: INTERNAL MEDICINE

## 2019-03-19 PROCEDURE — 77030013991 HC SNR POLYP ENDOSC BSC -A: Performed by: INTERNAL MEDICINE

## 2019-03-19 PROCEDURE — 76040000025: Performed by: INTERNAL MEDICINE

## 2019-03-19 PROCEDURE — 77030009426 HC FCPS BIOP ENDOSC BSC -B: Performed by: INTERNAL MEDICINE

## 2019-03-19 PROCEDURE — 74011250636 HC RX REV CODE- 250/636: Performed by: ANESTHESIOLOGY

## 2019-03-19 RX ORDER — PROPOFOL 10 MG/ML
INJECTION, EMULSION INTRAVENOUS AS NEEDED
Status: DISCONTINUED | OUTPATIENT
Start: 2019-03-19 | End: 2019-03-19 | Stop reason: HOSPADM

## 2019-03-19 RX ORDER — LIDOCAINE HYDROCHLORIDE 20 MG/ML
INJECTION, SOLUTION EPIDURAL; INFILTRATION; INTRACAUDAL; PERINEURAL AS NEEDED
Status: DISCONTINUED | OUTPATIENT
Start: 2019-03-19 | End: 2019-03-19 | Stop reason: HOSPADM

## 2019-03-19 RX ORDER — SODIUM CHLORIDE, SODIUM LACTATE, POTASSIUM CHLORIDE, CALCIUM CHLORIDE 600; 310; 30; 20 MG/100ML; MG/100ML; MG/100ML; MG/100ML
75 INJECTION, SOLUTION INTRAVENOUS CONTINUOUS
Status: DISCONTINUED | OUTPATIENT
Start: 2019-03-19 | End: 2019-03-19 | Stop reason: HOSPADM

## 2019-03-19 RX ORDER — PROPOFOL 10 MG/ML
INJECTION, EMULSION INTRAVENOUS
Status: DISCONTINUED | OUTPATIENT
Start: 2019-03-19 | End: 2019-03-19 | Stop reason: HOSPADM

## 2019-03-19 RX ADMIN — PROPOFOL 160 MCG/KG/MIN: 10 INJECTION, EMULSION INTRAVENOUS at 08:02

## 2019-03-19 RX ADMIN — LIDOCAINE HYDROCHLORIDE 40 MG: 20 INJECTION, SOLUTION EPIDURAL; INFILTRATION; INTRACAUDAL; PERINEURAL at 08:02

## 2019-03-19 RX ADMIN — SODIUM CHLORIDE, SODIUM LACTATE, POTASSIUM CHLORIDE, AND CALCIUM CHLORIDE 75 ML/HR: 600; 310; 30; 20 INJECTION, SOLUTION INTRAVENOUS at 07:28

## 2019-03-19 RX ADMIN — PROPOFOL 50 MG: 10 INJECTION, EMULSION INTRAVENOUS at 08:02

## 2019-03-19 NOTE — ROUTINE PROCESS
VSS. No complaints noted. Education given and reviewed with wife who voiced understanding. Pt wheeled out via wheelchair by Asiya Hwang.

## 2019-03-19 NOTE — DISCHARGE INSTRUCTIONS
Gastrointestinal Colonoscopy/Flexible Sigmoidoscopy - Lower Exam Discharge Instructions  1. Call Dr. Merceda Primrose at [phone] for any problems or questions. 2. Contact the doctors office for follow up appointment as directed  3. Medication may cause drowsiness for several hours, therefore, do not drive or operate machinery or sign legal documents for remainder of the day. 4. No alcohol today. 5. Ordinarily, you may resume regular diet and activity after exam unless otherwise specified by your physician. 6. Because of air put into your colon during exam, you may experience some abdominal distension, relieved by the passage of gas, for several hours. 7. Contact your physician if you have any of the following:  a. Excessive amount of bleeding - large amount when having a bowel movement. Occasional specks of blood with bowel movement would not be unusual.  b. Severe abdominal pain  c. Fever or Chills  8. Polyp Removal - follow these additional instructions  a. Take Metamucil - 1 tablespoon in juice every morning for 3 days if needed; avoid constipation. b. No Aspirin, Advil, Aleve, Nuprin, Ibuprofen, or medications that contain these drugs for 2 weeks. Any additional instructions:  1. Follow up pathology results. 2. Repeat colonoscopy for surveillance based on pathology results. 3. Return to office on an as-needed basis. Instructions given to Pauline Srivastava and other family members.

## 2019-03-19 NOTE — PROGRESS NOTES
Pt complains of gas pain and bloating. Unable to pass air at this time. Pt ambulated to bathroom with nurse assistance.

## 2019-03-19 NOTE — OP NOTES
Gastroenterology Procedure Note           Procedure:  Colonoscopy    Date of Procedure:  3/19/2019    Patient:  Jazzy Edmond     1960    Indication:  History of colon polyps     Sedation:  MAC    Pre-Procedure Exam:    Mental status:  alert and oriented  Airway:  normal oropharyngeal airway and neck mobility  CV:  regular rate and rhythm   Respiratory:  clear to auscultation    Procedure:  A History and Physical has been performed, and patient medication allergies have been reviewed. Risks of perforation, hemorrhage, adverse drug reaction, and aspiration were discussed. Informed consent was obtained for the procedure, including sedation. The patient was placed in the left lateral decubitus position. The heart rate, oxygen saturations, blood pressure, and response to care were monitored throughout the procedure. After performing a rectal exam, the colonoscope was passed through the anus and advanced under direct vision to the cecum, identified by appendiceal orifice and ileocecal valve. The quality of prep was adequate. A careful inspection was made as the colonoscope was withdrawn, including a retroflexed view of the rectum. The patient tolerated the procedure well. Findings:     ANUS:  Anal exam reveals no masses or hemorrhoids. RECTUM:  Internal hemorrhoids were seen in the rectum. SIGMOID COLON:  The mucosa is normal with good vascular pattern and without ulcers, diverticula, and polyps. DESCENDING COLON:  The mucosa is normal with good vascular pattern and without ulcers, diverticula, and polyps. SPLENIC FLEXURE:  The splenic flexure is normal.   TRANSVERSE COLON:  Two 4-6 mm sessile polyps were removed using a cold snare. One 3 mm sessile polyp was removed using a cold forceps. HEPATIC FLEXURE:  The hepatic flexure is normal.   ASCENDING COLON:  Two 6-8 mm sessile polyps were removed using a cold snare. One 3 mm sessile polyp was removed using a cold forceps.    CECUM:  The appendiceal orifice appears normal. The ileocecal valve appears normal.   TERMINAL ILEUM:  The terminal ileum was not entered. Specimen:  Yes    Estimated Blood Loss:  Minimal    Implant:  None           Impression:    Colon polyps. Internal hemorrhoids. Plan:  1. Follow up pathology results. 2. Repeat colonoscopy for surveillance based on pathology results. 3. Return to office on an as-needed basis.      Signed:  Spencer Fernández MD  3/19/2019  8:22 AM

## 2019-03-19 NOTE — ANESTHESIA PREPROCEDURE EVALUATION
Anesthetic History               Review of Systems / Medical History  Patient summary reviewed and pertinent labs reviewed    Pulmonary          Undiagnosed apnea         Neuro/Psych         Psychiatric history    Comments: Chronic back pain Cardiovascular    Hypertension              Exercise tolerance: >4 METS     GI/Hepatic/Renal     GERD           Endo/Other    Diabetes    Morbid obesity and arthritis     Other Findings            Physical Exam    Airway  Mallampati: III  TM Distance: 4 - 6 cm  Neck ROM: normal range of motion   Mouth opening: Normal     Cardiovascular    Rhythm: regular           Dental    Dentition: Full upper dentures     Pulmonary                 Abdominal  GI exam deferred       Other Findings            Anesthetic Plan    ASA: 3  Anesthesia type: total IV anesthesia          Induction: Intravenous  Anesthetic plan and risks discussed with: Patient

## 2019-03-19 NOTE — ANESTHESIA POSTPROCEDURE EVALUATION
Procedure(s):  COLONOSCOPY/ 48  ENDOSCOPIC POLYPECTOMY.     Anesthesia Post Evaluation      Multimodal analgesia: multimodal analgesia not used between 6 hours prior to anesthesia start to PACU discharge  Patient location during evaluation: PACU  Level of consciousness: awake  Pain management: adequate  Airway patency: patent  Anesthetic complications: no  Cardiovascular status: acceptable  Respiratory status: acceptable  Hydration status: acceptable  Post anesthesia nausea and vomiting:  none      Visit Vitals  BP (!) 166/102   Pulse 80   Temp 37.2 °C (98.9 °F)   Resp 18   Ht 6' (1.829 m)   Wt 158.8 kg (350 lb)   SpO2 98%   BMI 47.47 kg/m²

## 2019-03-19 NOTE — H&P
History and Physical for Colonoscopy             Date: 3/19/2019     History of Present Illness:  Patient presents to undergo colonoscopy. Past Medical History:   Diagnosis Date    Allergic rhinitis due to allergen     Arthritis     Chronic pain     back pain    Colon polyps 2016    Deficiency, lipoprotein 2016    Diabetes mellitus type 2, controlled ([facility] [address] 2016    oral agents only, does not check glucose at home, last A1C was 5.8    Encounter for long-term (current) use of medications 2016    GERD (gastroesophageal reflux disease)     HLD (hyperlipidemia) 2016    HTN (hypertension) 2016    Obesity 2016    BMI 52    Other abnormal glucose 2016    Psychiatric disorder     hx of situational depression, no treatment at this time    Sinusitis, acute maxillary 2016    URI (upper respiratory infection) 2016     Past Surgical History:   Procedure Laterality Date    HX ADENOIDECTOMY      HX HEENT      sinus surgery    HX OTHER SURGICAL      Sinus Surgery    HX TONSILLECTOMY      NEUROLOGICAL PROCEDURE UNLISTED      KAMALA and nerve blocks in office      Family History   Problem Relation Age of Onset    Cancer Father         Colon    Coronary Artery Disease Father     Heart Disease Father     Cancer Paternal Aunt         Colon     Social History     Tobacco Use    Smoking status: Former Smoker     Packs/day: 1.00     Years: 25.00     Pack years: 25.00     Last attempt to quit: 2003     Years since quittin.2    Smokeless tobacco: Never Used   Substance Use Topics    Alcohol use: Yes     Comment: occ        No Known Allergies  Current Facility-Administered Medications   Medication Dose Route Frequency    lactated Ringers infusion  75 mL/hr IntraVENous CONTINUOUS        Review of Systems:  A detailed 10 organ review of systems is obtained with pertinent positives as listed in the History of Present Illness.   All others are negative. Objective:     Physical Exam:  Visit Vitals  BP (!) 181/95   Pulse 93   Temp 98.9 °F (37.2 °C)   Resp 16   Ht 6' (1.829 m)   Wt 158.8 kg (350 lb)   SpO2 95%   BMI 47.47 kg/m²      General:  Alert and oriented. Heart: Regular rate and rhythm  Lungs:  Clear to auscultation bilaterally  Abdomen: Soft, nontender, nondistended    Impression/Plan:     Proceed with colonoscopy as planned. I have discussed with the patient the technique, benefits, alternatives, and risks of these procedures, including medication reaction, immediate or delayed bleeding, or perforation of the gastrointestinal tract.      Signed By: Ambrose Oseguera MD     March 19, 2019

## 2019-04-04 NOTE — PROGRESS NOTES
Imtiaz Trinh  : 1960  Primary: [facility]*  Secondary: [address] at [address], [address], [address] [facility]  [phone]   [phone]          OUTPATIENT PHYSICAL THERAPY:Discontinuation Summary 19   ICD-10: Treatment Diagnosis: Low back pain (M54.5)  Precautions/Allergies:   Patient has no known allergies. Fall Risk Score: 1 (? 5 = High Risk)  MD Orders: Evaluate and treat, aquatic therapy  MEDICAL/REFERRING DIAGNOSIS:  low back   DATE OF ONSET: Chronic condition  REFERRING PHYSICIAN: Agusto Petersen, *  RETURN PHYSICIAN APPOINTMENT: TBD     INITIAL ASSESSMENT:  Mr. Alen Hernandez cancelled final appt so no objective data was taken at discharge. PROBLEM LIST (Impacting functional limitations):  1. Decreased ADL/Functional Activities  2. Decreased Transfer Abilities  3. Decreased Ambulation Ability/Technique  4. Increased Pain  5. Decreased Activity Tolerance  6. Decreased Flexibility/Joint Mobility  7. Decreased [place] with Home Exercise Program INTERVENTIONS PLANNED:  1. Home Exercise Program (HEP)  2. Manual Therapy  3. Range of Motion (ROM)  4. Therapeutic Activites  5. Therapeutic Exercise/Strengthening  6. Transcutaneous Electrical Nerve Stimulation (TENS)  7. Ultrasound (US)  8.  Aquatic therapy

## 2019-10-24 ENCOUNTER — HOSPITAL ENCOUNTER (OUTPATIENT)
Age: 59
Setting detail: OUTPATIENT SURGERY
Discharge: HOME | End: 2019-10-24
Attending: ANESTHESIOLOGY | Admitting: ANESTHESIOLOGY
Payer: COMMERCIAL

## 2019-10-24 ENCOUNTER — APPOINTMENT (OUTPATIENT)
Dept: GENERAL RADIOLOGY | Age: 59
End: 2019-10-24
Attending: ANESTHESIOLOGY
Payer: COMMERCIAL

## 2019-10-24 VITALS
OXYGEN SATURATION: 98 % | WEIGHT: 315 LBS | TEMPERATURE: 98.6 F | SYSTOLIC BLOOD PRESSURE: 159 MMHG | HEART RATE: 72 BPM | DIASTOLIC BLOOD PRESSURE: 78 MMHG | RESPIRATION RATE: 16 BRPM | BODY MASS INDEX: 47.47 KG/M2

## 2019-10-24 PROBLEM — D12.6 BENIGN NEOPLASM OF COLON: Status: ACTIVE | Noted: 2019-10-24

## 2019-10-24 PROBLEM — R73.9 HYPERGLYCEMIA: Status: ACTIVE | Noted: 2019-01-30

## 2019-10-24 PROBLEM — Z80.0 FAMILY HISTORY OF MALIGNANT NEOPLASM OF GASTROINTESTINAL TRACT: Status: ACTIVE | Noted: 2019-10-24

## 2019-10-24 PROBLEM — Z86.010 HISTORY OF COLONIC POLYPS: Status: ACTIVE | Noted: 2019-10-24

## 2019-10-24 PROCEDURE — 99152 MOD SED SAME PHYS/QHP 5/>YRS: CPT

## 2019-10-24 PROCEDURE — 77030014125 HC TY EPDRL BBMI -B: Performed by: ANESTHESIOLOGY

## 2019-10-24 PROCEDURE — 74011250636 HC RX REV CODE- 250/636: Performed by: ANESTHESIOLOGY

## 2019-10-24 PROCEDURE — 77030029505: Performed by: ANESTHESIOLOGY

## 2019-10-24 PROCEDURE — 76010000154 HC OR TIME FIRST 0.5 HR: Performed by: ANESTHESIOLOGY

## 2019-10-24 PROCEDURE — 74011000250 HC RX REV CODE- 250: Performed by: ANESTHESIOLOGY

## 2019-10-24 PROCEDURE — A4300 CATH IMPL VASC ACCESS PORTAL: HCPCS | Performed by: ANESTHESIOLOGY

## 2019-10-24 PROCEDURE — 76210000021 HC REC RM PH II 0.5 TO 1 HR: Performed by: ANESTHESIOLOGY

## 2019-10-24 RX ORDER — GABAPENTIN 300 MG/1
CAPSULE ORAL
COMMUNITY

## 2019-10-24 RX ORDER — LIDOCAINE HYDROCHLORIDE 20 MG/ML
INJECTION, SOLUTION INFILTRATION; PERINEURAL AS NEEDED
Status: DISCONTINUED | OUTPATIENT
Start: 2019-10-24 | End: 2019-10-24 | Stop reason: HOSPADM

## 2019-10-24 RX ORDER — TRIAMCINOLONE ACETONIDE 40 MG/ML
INJECTION, SUSPENSION INTRA-ARTICULAR; INTRAMUSCULAR AS NEEDED
Status: DISCONTINUED | OUTPATIENT
Start: 2019-10-24 | End: 2019-10-24 | Stop reason: HOSPADM

## 2019-10-24 RX ORDER — CHLORZOXAZONE 500 MG/1
TABLET ORAL
COMMUNITY

## 2019-10-24 RX ORDER — BUPIVACAINE HYDROCHLORIDE 2.5 MG/ML
INJECTION, SOLUTION EPIDURAL; INFILTRATION; INTRACAUDAL AS NEEDED
Status: DISCONTINUED | OUTPATIENT
Start: 2019-10-24 | End: 2019-10-24 | Stop reason: HOSPADM

## 2019-10-24 RX ORDER — SULFAMETHOXAZOLE AND TRIMETHOPRIM 800; 160 MG/1; MG/1
TABLET ORAL
COMMUNITY
End: 2019-10-29

## 2019-10-24 RX ORDER — IBUPROFEN AND FAMOTIDINE 26.6; 8 MG/1; MG/1
TABLET, FILM COATED ORAL
Status: ON HOLD | COMMUNITY
End: 2019-10-24 | Stop reason: SDUPTHER

## 2019-10-24 RX ORDER — FENTANYL CITRATE 50 UG/ML
INJECTION, SOLUTION INTRAMUSCULAR; INTRAVENOUS AS NEEDED
Status: DISCONTINUED | OUTPATIENT
Start: 2019-10-24 | End: 2019-10-24 | Stop reason: HOSPADM

## 2019-10-24 RX ORDER — SPIRONOLACTONE 25 MG/1
TABLET ORAL
COMMUNITY

## 2019-10-24 RX ORDER — MIDAZOLAM HYDROCHLORIDE 1 MG/ML
INJECTION, SOLUTION INTRAMUSCULAR; INTRAVENOUS AS NEEDED
Status: DISCONTINUED | OUTPATIENT
Start: 2019-10-24 | End: 2019-10-24 | Stop reason: HOSPADM

## 2019-10-24 NOTE — BRIEF OP NOTE
BRIEF OPERATIVE NOTE    Date of Procedure: 10/24/2019   Preoperative Diagnosis: Spondylosis of lumbosacral region without myelopathy or radiculopathy [M47.817]  Postoperative Diagnosis: Spondylosis of lumbosacral region without myelopathy or radiculopathy [M47.817]    Procedure(s):  RIGHT L4-L5 , L5-S1 RADIO FREQUENCY LUMBAR  Surgeon(s) and Role:     * Yessi Valle MD - Primary         Surgical Assistant: None    Surgical Staff:  Circ-1: Penelope Kelly RN; Duy Sam  Local Nurse Monitor: Abida Puckett RN  Radiology Technician: Leopold Reach  Scrub Tech-1: Carmela Buerger  Event Time In Time Out   Incision Start 4677    Incision Close 0751      Anesthesia: Con-Sed   Estimated Blood Loss: None  Specimens: * No specimens in log *   Findings: None   Complications: None  Implants: * No implants in log *

## 2019-10-24 NOTE — DISCHARGE INSTRUCTIONS
Pain Management Aftercare    Common Side Effects that may last 8-12 hours:  Drowsiness  Slurred speech  Dizziness  Poor balance  Blurred vision     Hangover effect (headache, upset stomach, etc.)    Aftercare Instructions: You must have a responsible adult drive you home. Do not drive a car or operate equipment for at least 12 hours. Do not take any new medications for at least 24 hours unless your doctor has prescribed them and he is aware that you are taking them. Do not drink any alcoholic beverages until the next day. Advance to your normal diet as tolerated. Expect soreness at the injection site that will improve over the next 24 hours. Avoid strenuous exercise or heavy lifting. Pre-injection activities may be resumed tomorrow (unless instructed otherwise by your doctor). Notify your doctor immediately if any of the following symptoms occur:  Severe pain at the injection site  Bleeding or drainage from injection site  Fever 101 degrees F or greater  New or increased weakness/numbness of extremities that does not resolve  Severe headache that disappears or gets better when you lie down  Breathing difficulty  Skin rash  Vomiting  Seizures  Unusual drowsiness    Doctor's Phone Number: [phone]    Follow-up Care:keep scheduled appointment      DISCHARGE SUMMARY from Nurse    PATIENT INSTRUCTIONS:    After general anesthesia or intravenous sedation, for 24 hours or while taking prescription Narcotics:  · Limit your activities  · Do not drive and operate hazardous machinery  · Do not make important personal or business decisions  · Do  not drink alcoholic beverages  · If you have not urinated within 8 hours after discharge, please contact your surgeon on call. *  Please give a list of your current medications to your Primary Care Provider.     *  Please update this list whenever your medications are discontinued, doses are      changed, or new medications (including over-the-counter products) are added.    *  Please carry medication information at all times in case of emergency situations. These are general instructions for a healthy lifestyle:    No smoking/ No tobacco products/ Avoid exposure to second hand smoke    Surgeon General's Warning:  Quitting smoking now greatly reduces serious risk to your health. Obesity, smoking, and sedentary lifestyle greatly increases your risk for illness    A healthy diet, regular physical exercise & weight monitoring are important for maintaining a healthy lifestyle    You may be retaining fluid if you have a history of heart failure or if you experience any of the following symptoms:  Weight gain of 3 pounds or more overnight or 5 pounds in a week, increased swelling in our hands or feet or shortness of breath while lying flat in bed. Please call your doctor as soon as you notice any of these symptoms; do not wait until your next office visit. Recognize signs and symptoms of STROKE:    F-face looks uneven    A-arms unable to move or move unevenly    S-speech slurred or non-existent    T-time-call 911 as soon as signs and symptoms begin-DO NOT go       Back to bed or wait to see if you get better-TIME IS BRAIN.

## 2019-10-24 NOTE — OP NOTES
Location: [facility]     Procedure: Lumbar radiofrequency lesioning of the RIGHT L3 and L4 medial branches and L5 dorsal ramus with fluoroscopic guidance     Preoperative diagnosis: Chronic low back pain and lumbar facet syndrome     Surgeon: Esperanza An MD     Description: With consent obtained and the patient in the prone position on an abdominal bolster, the patient's low back was prepped and draped in sterile fashion using Betadine. The junction of the superior articular process of S1 and sacral ala was identified under oblique as well as caudad-angulated fluoroscopy, and the skin and subcutaneous tissue were anesthetized with 0.25% bupivacaine. An 20-gauge 150-mm radiofrequency cannula with 10-mm active tip was used to advance down to this junction in close proximity to the L5 dorsal ramus and confirmed on both AP and lateral fluoroscopy. Motor down the leg was negative at 2 volts. Preservative free 2% lidocaine 0.5mL was then injected. We then carried out 2 sets of radiofrequency lesioning at 80 degrees Celsius for 90 seconds for each lesion, with the needle being rotated 180 degrees for the subsequent lesioning. This same procedure was performed at the L4 and L5 vertebral levels corresponding to the L3 and L4 medial branches respectively. At the end of the procedure a total of triamcinolone 40 mg in 0.25% bupivacaine, 1 mL per level, was injected, the needles removed, and bandages applied. The patient was brought to the recovery room in stable condition and discharged home. There were no apparent complications. Postoperatively there was no change in lower extremity strength or sensation. The patient was instructed to make a follow up appointment in the office.

## 2019-10-24 NOTE — H&P
.         History and Physical    Patient: Bridgett Bartholomew MRN: [id number]  SSN: [id number]    YOB: 1960  Age: 60 y.o. Sex: male      Subjective:      Bridgett Bartholomew is a 60 y.o. male who presents with a history of chronic low back pain and facet mediated pain for RIGHT L4/5 and L5/S1 MB RFl.      Past Medical History:   Diagnosis Date    Allergic rhinitis due to allergen     Arthritis     Chronic pain     back pain    Colon polyps 2016    Deficiency, lipoprotein 2016    Diabetes mellitus type 2, controlled ([address] 2016    oral agents only, does not check glucose at home, last A1C was 5.8    Encounter for long-term (current) use of medications 2016    GERD (gastroesophageal reflux disease)     HLD (hyperlipidemia) 2016    HTN (hypertension) 2016    Obesity 2016    BMI 52    Other abnormal glucose 2016    Psychiatric disorder     hx of situational depression, no treatment at this time    Sinusitis, acute maxillary 2016    URI (upper respiratory infection) 2016     Past Surgical History:   Procedure Laterality Date    COLONOSCOPY N/A 3/19/2019    COLONOSCOPY/ 48 performed by Dipti Acosta [facility] at [facility] ENDOSCOPY    HX ADENOIDECTOMY      HX HEENT      sinus surgery    HX OTHER SURGICAL      Sinus Surgery    HX TONSILLECTOMY      NEUROLOGICAL PROCEDURE UNLISTED      KAMALA and nerve blocks in office      Family History   Problem Relation Age of Onset    Cancer Father         Colon    Coronary Artery Disease Father     Heart Disease Father     Cancer Paternal Aunt         Colon     Social History     Tobacco Use    Smoking status: Former Smoker     Packs/day: 1.00     Years: 25.00     Pack years: 25.00     Last attempt to quit: 2003     Years since quittin.8    Smokeless tobacco: Never Used   Substance Use Topics    Alcohol use: Yes     Comment: occ      Prior to Admission medications    Medication Sig Start Date End Date Taking? Authorizing Provider   traMADol (ULTRAM) 50 mg tablet Take 1 Tab by mouth every six (6) hours as needed for Pain. Max Daily Amount: 200 mg. Indications: Pain 1/25/18  Yes Tejal Del Cid MD   metroNIDAZOLE (METROGEL) 1 % topical gel Apply 1 g to affected area daily. Use a thin layer to affected areas after washing  Patient taking differently: Apply 1 g to affected area as needed. Use a thin layer to affected areas after washing 1/25/18  Yes Tejal Del Cid MD   lansoprazole (PREVACID) 30 mg capsule Take 1 Cap by mouth Daily (before breakfast). Patient taking differently: Take 30 mg by mouth nightly. 1/25/18  Yes Tejal Del Cid MD   lisinopril-hydroCHLOROthiazide (PRINZIDE, ZESTORETIC) 20-12.5 mg per tablet Take 1 Tab by mouth daily. Patient taking differently: Take 1 Tab by mouth nightly. 1/25/18  Yes Tejal Del Cid MD   simvastatin (ZOCOR) 40 mg tablet Take 1 Tab by mouth daily. Patient taking differently: Take 40 mg by mouth nightly. 1/25/18  Yes Tejal Del Cid MD   metoprolol succinate (TOPROL-XL) 200 mg XL tablet Take 1 Tab by mouth daily. Patient taking differently: Take 200 mg by mouth nightly. 1/25/18  Yes Tejal Del Cid MD   chlorzoxazone (PARAFON FORTE) 500 mg tablet chlorzoxazone 500 mg tablet    Provider, Historical   influenza vaccine 2018-19, 36 mos+,,PF, (FLUZONE QUAD) syrg injection Fluzone Quad 2018-19(PF) 60 mcg(15 mcgx4)/0.5 mL intramuscular syringe    Provider, Historical   gabapentin (NEURONTIN) 300 mg capsule gabapentin 300 mg capsule    Provider, Historical   pneumococcal 23-nadeem ps vaccine (PNEUMOVAX 23) 25 mcg/0.5 mL injection Pneumovax 23 25 mcg/0.5 mL injection syringe    Provider, Historical   SITagliptin-metFORMIN (JANUMET XR) 50-1,000 mg TM24 Janumet XR 50 mg-1,000 mg tablet,extended release   Take 1 tablet twice a day by oral route for 90 days.     Provider, Historical   spironolactone (ALDACTONE) 25 mg tablet spironolactone 25 mg tablet Provider, Historical   trimethoprim-sulfamethoxazole (BACTRIM DS) 160-800 mg per tablet Bactrim  mg-160 mg tablet   Take 1 tablet every 12 hours by oral route for 10 days. Provider, Historical   doxycycline (ADOXA) 100 mg tablet Take 1 Tab by mouth two (2) times a day. Indications: ACNE ROSACEA  Patient taking differently: Take 100 mg by mouth as needed. Patient takes as needed for acne 1/25/18   Job Mail., MD   Insulin Needles, Disposable, (NOVOFINE 32) 32 gauge x 1/4\" ndle 1 misc daily. For Victoza pen 7/6/17   Job Mail., MD   PEN NEEDLE, DIABETIC (NOVOFINE 32) 32 g by Does Not Apply route daily. Indications: 32G x 6 MM,    Provider, Historical        Allergies   Allergen Reactions    Pioglitazone Swelling       Review of Systems:  Pertinent items are noted in the History of Present Illness.     Objective:     Vitals:    10/24/19 0643   BP: (!) 176/95   Pulse: 88   Resp: 18   Temp: 98.9 °F (37.2 °C)   SpO2: 97%   Weight: 158.8 kg (350 lb)        Physical Exam:  GENERAL: alert, cooperative, no distress, appears stated age, obes  Chest: CAT B/L  Heart: RRR      Assessment:     Hospital Problems  Date Reviewed: 3/19/2019    None          Plan:     RIGHT L4/5 and L5/S1 MB RFL    Signed By: Beverly Schultz MD     October 24, 2019

## 2019-10-24 NOTE — H&P (VIEW-ONLY)
. 
   
  
History and Physical 
 
Patient: Gómez Santo MRN: [id number]  SSN: [id number] YOB: 1960  Age: 60 y.o. Sex: male Subjective:  
  
Gómez Santo is a 60 y.o. male who presents with a history of chronic low back pain and facet mediated pain for RIGHT L4/5 and L5/S1 MB RFl. Past Medical History:  
Diagnosis Date  Allergic rhinitis due to allergen  Arthritis  Chronic pain   
 back pain  Colon polyps 2016  Deficiency, lipoprotein 2016  Diabetes mellitus type 2, controlled ([facility] [address] 2016  
 oral agents only, does not check glucose at home, last A1C was 5.8  Encounter for long-term (current) use of medications 2016  GERD (gastroesophageal reflux disease)  HLD (hyperlipidemia) 2016  
 HTN (hypertension) 2016  Obesity 2016 BMI 47  
 Other abnormal glucose 2016  Psychiatric disorder   
 hx of situational depression, no treatment at this time  Sinusitis, acute maxillary 2016  URI (upper respiratory infection) 2016 Past Surgical History:  
Procedure Laterality Date  COLONOSCOPY N/A 3/19/2019 COLONOSCOPY/ 48 performed by Amy Brady MD at [address] [address]  HX HEENT    
 sinus surgery  HX OTHER SURGICAL   Sinus Surgery  HX TONSILLECTOMY  NEUROLOGICAL PROCEDURE UNLISTED    
 KAMALA and nerve blocks in office Family History Problem Relation Age of Onset  Cancer Father Colon  Coronary Artery Disease Father  Heart Disease Father  Cancer Paternal Aunt Colon Social History Tobacco Use  Smoking status: Former Smoker Packs/day: 1.00 Years: 25.00 Pack years: 25.00 Last attempt to quit: 2003 Years since quittin.8  Smokeless tobacco: Never Used Substance Use Topics  Alcohol use: Yes Comment: occ Prior to Admission medications Medication Sig Start Date End Date Taking? Authorizing Provider  
traMADol (ULTRAM) 50 mg tablet Take 1 Tab by mouth every six (6) hours as needed for Pain. Max Daily Amount: 200 mg. Indications: Pain 1/25/18  Yes Milton Burger MD  
metroNIDAZOLE (METROGEL) 1 % topical gel Apply 1 g to affected area daily. Use a thin layer to affected areas after washing Patient taking differently: Apply 1 g to affected area as needed. Use a thin layer to affected areas after washing 1/25/18  Yes Milton Burger MD  
lansoprazole (PREVACID) 30 mg capsule Take 1 Cap by mouth Daily (before breakfast). Patient taking differently: Take 30 mg by mouth nightly. 1/25/18  Yes Milton Burger MD  
lisinopril-hydroCHLOROthiazide (PRINZIDE, ZESTORETIC) 20-12.5 mg per tablet Take 1 Tab by mouth daily. Patient taking differently: Take 1 Tab by mouth nightly. 1/25/18  Yes Milton Burger MD  
simvastatin (ZOCOR) 40 mg tablet Take 1 Tab by mouth daily. Patient taking differently: Take 40 mg by mouth nightly. 1/25/18  Yes Milton Burger MD  
metoprolol succinate (TOPROL-XL) 200 mg XL tablet Take 1 Tab by mouth daily. Patient taking differently: Take 200 mg by mouth nightly. 1/25/18  Yes Milton Burger MD  
chlorzoxazone (PARAFON FORTE) 500 mg tablet chlorzoxazone 500 mg tablet    Provider, Historical  
influenza vaccine 2018-19, 36 mos+,,PF, (FLUZONE QUAD) syrg injection Fluzone Quad 2018-19(PF) 60 mcg(15 mcgx4)/0.5 mL intramuscular syringe    Provider, Historical  
gabapentin (NEURONTIN) 300 mg capsule gabapentin 300 mg capsule    Provider, Historical  
pneumococcal 23-nadeem ps vaccine (PNEUMOVAX 23) 25 mcg/0.5 mL injection Pneumovax 23 25 mcg/0.5 mL injection syringe    Provider, Historical  
SITagliptin-metFORMIN (JANUMET XR) 50-1,000 mg TM24 Janumet XR 50 mg-1,000 mg tablet,extended release Take 1 tablet twice a day by oral route for 90 days.     Provider, Historical  
 spironolactone (ALDACTONE) 25 mg tablet spironolactone 25 mg tablet    Provider, Historical  
trimethoprim-sulfamethoxazole (BACTRIM DS) 160-800 mg per tablet Bactrim  mg-160 mg tablet Take 1 tablet every 12 hours by oral route for 10 days. Provider, Historical  
doxycycline (ADOXA) 100 mg tablet Take 1 Tab by mouth two (2) times a day. Indications: ACNE ROSACEA Patient taking differently: Take 100 mg by mouth as needed. Patient takes as needed for acne 1/25/18   Burgess Penelope MD  
Insulin Needles, Disposable, (NOVOFINE 32) 32 gauge x 1/4\" ndle 1 misc daily. For Victoza pen 7/6/17   Burgess Penelope MD  
PEN NEEDLE, DIABETIC (NOVOFINE 32) 32 g by Does Not Apply route daily. Indications: 32G x 6 MM,    Provider, Historical  
  
 
Allergies Allergen Reactions  Pioglitazone Swelling Review of Systems: 
Pertinent items are noted in the History of Present Illness. Objective:  
 
Vitals:  
 10/24/19 8360 BP: (!) 176/95 Pulse: 88 Resp: 18 Temp: 98.9 °F (37.2 °C) SpO2: 97% Weight: 158.8 kg (350 lb) Physical Exam: 
GENERAL: alert, cooperative, no distress, appears stated age, obes Chest: CAT B/L Heart: RRR Assessment:  
 
Hospital Problems  Date Reviewed: 3/19/2019 None Plan:  
 
RIGHT L4/5 and L5/S1 MB RFL Signed By: Eliza Ramos MD   
 October 24, 2019

## 2019-10-24 NOTE — OP NOTES
Of note, the patient underwent conscious sedation via an intravenous line with ASA monitoring, using Fentanyl 25mcg and midazolam 1mg.

## 2019-10-31 ENCOUNTER — HOSPITAL ENCOUNTER (OUTPATIENT)
Age: 59
Setting detail: OUTPATIENT SURGERY
Discharge: HOME | End: 2019-10-31
Attending: ANESTHESIOLOGY | Admitting: ANESTHESIOLOGY
Payer: COMMERCIAL

## 2019-10-31 ENCOUNTER — APPOINTMENT (OUTPATIENT)
Dept: GENERAL RADIOLOGY | Age: 59
End: 2019-10-31
Attending: ANESTHESIOLOGY
Payer: COMMERCIAL

## 2019-10-31 VITALS
SYSTOLIC BLOOD PRESSURE: 133 MMHG | RESPIRATION RATE: 14 BRPM | BODY MASS INDEX: 46.79 KG/M2 | HEART RATE: 71 BPM | WEIGHT: 315 LBS | TEMPERATURE: 98.5 F | DIASTOLIC BLOOD PRESSURE: 65 MMHG | OXYGEN SATURATION: 93 %

## 2019-10-31 PROCEDURE — 76210000063 HC OR PH I REC FIRST 0.5 HR: Performed by: ANESTHESIOLOGY

## 2019-10-31 PROCEDURE — 74011250636 HC RX REV CODE- 250/636: Performed by: ANESTHESIOLOGY

## 2019-10-31 PROCEDURE — 76010000154 HC OR TIME FIRST 0.5 HR: Performed by: ANESTHESIOLOGY

## 2019-10-31 PROCEDURE — 74011000250 HC RX REV CODE- 250: Performed by: ANESTHESIOLOGY

## 2019-10-31 PROCEDURE — 77030014125 HC TY EPDRL BBMI -B: Performed by: ANESTHESIOLOGY

## 2019-10-31 PROCEDURE — 77030029505: Performed by: ANESTHESIOLOGY

## 2019-10-31 PROCEDURE — 76210000020 HC REC RM PH II FIRST 0.5 HR: Performed by: ANESTHESIOLOGY

## 2019-10-31 RX ORDER — SULFAMETHOXAZOLE AND TRIMETHOPRIM 800; 160 MG/1; MG/1
TABLET ORAL
COMMUNITY
End: 2019-10-31

## 2019-10-31 RX ORDER — TRIAMCINOLONE ACETONIDE 40 MG/ML
INJECTION, SUSPENSION INTRA-ARTICULAR; INTRAMUSCULAR AS NEEDED
Status: DISCONTINUED | OUTPATIENT
Start: 2019-10-31 | End: 2019-10-31 | Stop reason: HOSPADM

## 2019-10-31 RX ORDER — CEPHALEXIN 500 MG/1
CAPSULE ORAL
COMMUNITY
End: 2019-10-31

## 2019-10-31 RX ORDER — LIDOCAINE HYDROCHLORIDE 20 MG/ML
INJECTION, SOLUTION INFILTRATION; PERINEURAL AS NEEDED
Status: DISCONTINUED | OUTPATIENT
Start: 2019-10-31 | End: 2019-10-31 | Stop reason: HOSPADM

## 2019-10-31 RX ORDER — METFORMIN HYDROCHLORIDE 500 MG/1
TABLET ORAL
COMMUNITY

## 2019-10-31 RX ORDER — BUPIVACAINE HYDROCHLORIDE 2.5 MG/ML
INJECTION, SOLUTION EPIDURAL; INFILTRATION; INTRACAUDAL AS NEEDED
Status: DISCONTINUED | OUTPATIENT
Start: 2019-10-31 | End: 2019-10-31 | Stop reason: HOSPADM

## 2019-10-31 NOTE — DISCHARGE INSTRUCTIONS
Pain Management Aftercare    Common Side Effects that may last 8-12 hours:  Drowsiness  Slurred speech  Dizziness  Poor balance  Blurred vision     Hangover effect (headache, upset stomach, etc.)    Aftercare Instructions: You must have a responsible adult drive you home. Do not drive a car or operate equipment for at least 12 hours. Do not take any new medications for at least 24 hours unless your doctor has prescribed them and he is aware that you are taking them. Do not drink any alcoholic beverages until the next day. Advance to your normal diet as tolerated. Expect soreness at the injection site that will improve over the next 24 hours. Avoid strenuous exercise or heavy lifting. Pre-injection activities may be resumed tomorrow (unless instructed otherwise by your doctor). Notify your doctor immediately if any of the following symptoms occur:  Severe pain at the injection site  Bleeding or drainage from injection site  Fever 101 degrees F or greater  New or increased weakness/numbness of extremities that does not resolve  Severe headache that disappears or gets better when you lie down  Breathing difficulty  Skin rash  Vomiting  Seizures  Unusual drowsiness      Follow-up Care:    ACTIVITY  · As tolerated and as directed by your doctor. · Bathe or shower as directed by your doctor. DIET  · Clear liquids until no nausea or vomiting; then light diet for the first day. · Advance to regular diet on second day, unless your doctor orders otherwise. · If nausea and vomiting continues, call your doctor. AFTER ANESTHESIA   · For the first 24 hours: DO NOT Drive, Drink alcoholic beverages, or Make important decisions. · Be aware of dizziness following anesthesia and while taking pain medication.        DISCHARGE SUMMARY from Nurse    PATIENT INSTRUCTIONS:    After general anesthesia or intravenous sedation, for 24 hours or while taking prescription Narcotics:  · Limit your activities  · Do not drive and operate hazardous machinery  · Do not make important personal or business decisions  · Do  not drink alcoholic beverages  · If you have not urinated within 8 hours after discharge, please contact your surgeon on call. *  Please give a list of your current medications to your Primary Care Provider. *  Please update this list whenever your medications are discontinued, doses are      changed, or new medications (including over-the-counter products) are added. *  Please carry medication information at all times in case of emergency situations. These are general instructions for a healthy lifestyle:    No smoking/ No tobacco products/ Avoid exposure to second hand smoke    Surgeon General's Warning:  Quitting smoking now greatly reduces serious risk to your health. Obesity, smoking, and sedentary lifestyle greatly increases your risk for illness    A healthy diet, regular physical exercise & weight monitoring are important for maintaining a healthy lifestyle    You may be retaining fluid if you have a history of heart failure or if you experience any of the following symptoms:  Weight gain of 3 pounds or more overnight or 5 pounds in a week, increased swelling in our hands or feet or shortness of breath while lying flat in bed. Please call your doctor as soon as you notice any of these symptoms; do not wait until your next office visit. Recognize signs and symptoms of STROKE:    F-face looks uneven    A-arms unable to move or move unevenly    S-speech slurred or non-existent    T-time-call 911 as soon as signs and symptoms begin-DO NOT go       Back to bed or wait to see if you get better-TIME IS BRAIN.

## 2019-10-31 NOTE — PROGRESS NOTES
's Pre-op visit requested by patient. Conveyed care and concern for patient and family. Offered prayer as requested for patient, family, and staff.     Elis Fishman MDiv, BS  Board Certified

## 2019-10-31 NOTE — BRIEF OP NOTE
BRIEF OPERATIVE NOTE    Date of Procedure: 10/31/2019   Preoperative Diagnosis: Spondylosis of lumbosacral region without myelopathy or radiculopathy [M47.817]  Postoperative Diagnosis: Spondylosis of lumbosacral region without myelopathy or radiculopathy [M47.817]    Procedure(s):  LEFT L4-L5 , L5-S1 RADIO FREQUENCY LUMBAR ABLATION  Surgeon(s) and Role:     * Leonarda De La Vega MD - Primary         Surgical Assistant: None    Surgical Staff:  Circ-1: Margot Sandy  Local Nurse Monitor: Sosa Gregory RN  Radiology Technician: RT Falguni  Scrub Tech-1: Moe Bro  Event Time In Time Out   Incision Start 8966    Incision Close 0746      Anesthesia: Con-Sed   Estimated Blood Loss: None  Specimens: * No specimens in log *   Findings: None   Complications: None  Implants: * No implants in log *

## 2019-10-31 NOTE — INTERVAL H&P NOTE
H&P Update: 
Wen Rivas was seen and examined. History and physical has been reviewed. The patient has been examined.  There have been no significant clinical changes since the completion of the originally dated History and Physical.

## 2019-11-07 NOTE — OP NOTES
Location: [facility]     Procedure: Lumbar radiofrequency lesioning of the LEFT L3 and L4 medial branches and L5 dorsal ramus with fluoroscopic guidance     Preoperative diagnosis: Preoperative diagnosis: Chronic low back pain and lumbar facet syndrome     Surgeon: Price Ken MD     Description: With consent obtained and the patient in the prone position on an abdominal bolster, the patient's low back was prepped and draped in sterile fashion using Betadine. Using conscious sedation with Midazolam and Fentayl IV, the junction of the superior articular process of S1 and sacral ala was identified under oblique as well as caudad-angulated fluoroscopy, and the skin and subcutaneous tissue were anesthetized with 0.25% bupivacaine. An 20-gauge 150-mm radiofrequency cannula with 10-mm active tip was used to advance down to this junction in close proximity to the L5 dorsal ramus and confirmed on both AP and lateral fluoroscopy. Motor down the leg was negative at 2 volts. Preservative free 2% lidocaine 0.5mL was then injected. We then carried out 2 sets of radiofrequency lesioning at 80 degrees Celsius for 90 seconds for each lesion, with the needle being rotated 180 degrees for the subsequent lesioning. This same procedure was performed at the L4 and L5 vertebral levels corresponding to the L3 and L4 medial branches respectively. At the end of the procedure a total of triamcinolone 40 mg in 0.25% bupivacaine, 1 mL per level, was injected, the needles removed, and bandages applied. The patient was brought to the recovery room in stable condition and discharged home. There were no apparent complications. Postoperatively there was no change in lower extremity strength or sensation. The patient was instructed to make a follow up appointment in the office in 2 weeks.

## 2022-02-01 ENCOUNTER — TRANSCRIBE ORDER (OUTPATIENT)
Dept: SCHEDULING | Age: 62
End: 2022-02-01

## 2022-02-01 DIAGNOSIS — M54.16 LUMBAR RADICULOPATHY: Primary | ICD-10-CM

## 2022-02-08 ENCOUNTER — HOSPITAL ENCOUNTER (OUTPATIENT)
Dept: MRI IMAGING | Age: 62
Discharge: HOME | End: 2022-02-08
Attending: NURSE PRACTITIONER
Payer: COMMERCIAL

## 2022-02-08 DIAGNOSIS — M54.16 LUMBAR RADICULOPATHY: ICD-10-CM

## 2022-02-08 PROCEDURE — 72148 MRI LUMBAR SPINE W/O DYE: CPT

## 2022-03-18 PROBLEM — D12.6 BENIGN NEOPLASM OF COLON: Status: ACTIVE | Noted: 2019-10-24

## 2022-03-18 PROBLEM — Z80.0 FAMILY HISTORY OF MALIGNANT NEOPLASM OF GASTROINTESTINAL TRACT: Status: ACTIVE | Noted: 2019-10-24

## 2022-03-18 PROBLEM — Z86.010 HISTORY OF COLONIC POLYPS: Status: ACTIVE | Noted: 2019-10-24

## 2022-03-19 PROBLEM — R73.9 HYPERGLYCEMIA: Status: ACTIVE | Noted: 2019-01-30

## 2022-04-05 NOTE — THERAPY EVALUATION
Bib Hansen  : 1960  Primary: Jocelyn Morales Of [place]*  Secondary: [place] [id number] [facility] at [facility] CHRISTIANO CABRERA  [address] [place] [address], [address] [place] [address],[address], [address]  [phone]   [phone]       OUTPATIENT PHYSICAL THERAPY:Initial Assessment 2/15/2018    ICD-10: Treatment Diagnosis: low back pain (M54.5)  Precautions/Allergies:   Review of patient's allergies indicates no known allergies. Fall Risk Score: 1 (? 5 = High Risk)  MD Orders: Aquatic therapy Evaluate and treat. MEDICAL/REFERRING DIAGNOSIS:  Low back pain [M54.5] DDD, stenosis  DATE OF ONSET: back problems most of his life  REFERRING PHYSICIAN: Chapin York MD  RETURN PHYSICIAN APPOINTMENT:       INITIAL ASSESSMENT:  Mr. Kiana Martinez presents with chronic back pain. Pt has done the pool in the past with good results and would like to do the pool again. Pt is good candidate for PT to address problems below. PROBLEM LIST (Impacting functional limitations):  1. Decreased Strength  2. Decreased Ambulation Ability/Technique  3. Increased Pain  4. Decreased Flexibility/Joint Mobility INTERVENTIONS PLANNED:  1. Home Exercise Program (HEP)  2. Manual Therapy  3. Therapeutic Exercise/Strengthening  4. aquatic therapy   TREATMENT PLAN:  Effective Dates: 2/15/18 TO 5/15/18. Frequency/Duration: 2 times a week for 8 weeks  GOALS: (Goals have been discussed and agreed upon with patient.)  Short-Term Functional Goals: Time Frame: 4 weeks  1. Pt will participate in 45 minute aquatic therapy 2/week consistently. 2. Pt will report consistency with HEP  3. Pt will report improved function and / or decrease in symptoms/pain. Discharge Goals: Time Frame: 8 weeks  1. Improved Trunk ROM and improved hip ROM for joint health and ease of movement. 2. Improve Oswestry by 10 points or greater indicating improved function  3.  Pt independent with HEP and/or aquatic program to maintain gains made in Detail Level: Detailed PT.  Rehabilitation Potential For Stated Goals: Deborah Rajputjuancarlosfox Hammond's therapy, I certify that the treatment plan above will be carried out by a therapist or under their direction. Thank you for this referral,  Da De La Rosa, PT     Referring Physician Signature: Maria Esther Padgett MD              Date                    The information in this section was collected on 2/15/18 (except where otherwise noted). HISTORY:   History of Present Injury/Illness (Reason for Referral):  Pt states he has chronic low back pain and he has radiating pain with standing into B legs to above knee level. He reports both pain and numbness. Standing and walking , bending , stooping increase pain. Sitting decreases pain but depends on support of chair. Pt spends several hour a day in recliner. Past Medical History/Comorbidities:   Mr. Adalgisa Spivey  has a past medical history of Allergic rhinitis due to allergen; Arthritis; Colon polyps (8/12/2016); Deficiency, lipoprotein (8/12/2016); Diabetes mellitus type 2, controlled ([facility] [address] (8/12/2016); Encounter for long-term (current) use of medications (8/12/2016); HLD (hyperlipidemia) (8/12/2016); HTN (hypertension) (8/12/2016); Obesity (8/12/2016); Other abnormal glucose (8/12/2016); Sinusitis, acute maxillary (8/12/2016); and URI (upper respiratory infection) (8/12/2016). Mr. Adalgisa Spivey  has a past surgical history that includes hx tonsillectomy; hx adenoidectomy; and hx other surgical (2005). 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   Previous Treatment Approaches:          Pt had PT with some aquatic therapy in the past.  Personal Factors:          Profession:  Not working since May 201        Overall Behavior:  Focused on pain and symptoms        Other factors that influence how disability is experienced by the patient:  Obesity, sedentary over past Quality 226: Preventive Care And Screening: Tobacco Use: Screening And Cessation Intervention: Patient screened for tobacco use and is an ex/non-smoker several months  Current Medications:       Current Outpatient Prescriptions:     traMADol (ULTRAM) 50 mg tablet, Take 1 Tab by mouth every six (6) hours as needed for Pain. Max Daily Amount: 200 mg. Indications: Pain, Disp: 120 Tab, Rfl: 2    metroNIDAZOLE (METROGEL) 1 % topical gel, Apply 1 g to affected area daily. Use a thin layer to affected areas after washing, Disp: 60 g, Rfl: 5    lansoprazole (PREVACID) 30 mg capsule, Take 1 Cap by mouth Daily (before breakfast). , Disp: 90 Cap, Rfl: 3    lisinopril-hydroCHLOROthiazide (PRINZIDE, ZESTORETIC) 20-12.5 mg per tablet, Take 1 Tab by mouth daily. , Disp: 90 Tab, Rfl: 3    simvastatin (ZOCOR) 40 mg tablet, Take 1 Tab by mouth daily. , Disp: 90 Tab, Rfl: 3    Liraglutide (VICTOZA) 0.6 mg/0.1 mL (18 mg/3 mL) pnij, 1.8 mg by SubCUTAneous route daily. Indications: type 2 diabetes mellitus, Disp: 3 Pen, Rfl: 11    doxycycline (ADOXA) 100 mg tablet, Take 1 Tab by mouth two (2) times a day. Indications: ACNE ROSACEA, Disp: 100 Tab, Rfl: 2    metoprolol succinate (TOPROL-XL) 200 mg XL tablet, Take 1 Tab by mouth daily. , Disp: 90 Tab, Rfl: 3    FLUoxetine (PROZAC) 20 mg tablet, Take 1 Tab by mouth daily. , Disp: 90 Tab, Rfl: 3    multivitamin (ONE A DAY) tablet, Take 1 Tab by mouth daily. , Disp: , Rfl:     Insulin Needles, Disposable, (NOVOFINE 32) 32 gauge x 1/4\" ndle, 1 misc daily. For Victoza pen, Disp: 100 Pen Needle, Rfl: 3    PEN NEEDLE, DIABETIC (NOVOFINE 32), 32 g by Does Not Apply route daily. Indications: 32G x 6 MM,, Disp: , Rfl:    Date Last Reviewed:  2/15/2018   Number of Personal Factors/Comorbidities that affect the Plan of Care: 3+: HIGH COMPLEXITY   EXAMINATION:   Observation/Orthostatic Postural Assessment:          Pt is obese with poor sitting posture. Pt reports increase in symptoms with corrected sitting posture.   ROM:          Trunk flex- finger tips to knee level - increases pain                 Ext- moderate limitation- painful to do but feels better after                 Side glide- mod limitation no increase pain. Hip flexion - 90 degrees B  Strength:   Grossly WFL LE. Body Structures Involved:  1. Joints  2. Muscles Body Functions Affected:  1. Sensory/Pain  2. Neuromusculoskeletal  3. Movement Related Activities and Participation Affected:  1. Mobility   Number of elements (examined above) that affect the Plan of Care: 4+: HIGH COMPLEXITY   CLINICAL PRESENTATION:   Presentation: Evolving clinical presentation with changing clinical characteristics: MODERATE COMPLEXITY   CLINICAL DECISION MAKING:   Outcome Measure: Tool Used: Modified Oswestry Low Back Pain Questionnaire  Score:  Initial: 36/50  Most Recent: X/50 (Date: -- )   Interpretation of Score: Each section is scored on a 0-5 scale, 5 representing the greatest disability. The scores of each section are added together for a total score of 50. Score 0 1-10 11-20 21-30 31-40 41-49 50   Modifier CH CI CJ CK CL CM CN     ? Mobility - Walking and Moving Around:     - CURRENT STATUS: CL - 60%-79% impaired, limited or restricted    - GOAL STATUS: CK - 40%-59% impaired, limited or restricted    - D/C STATUS:  ---------------To be determined---------------    Medical Necessity:   · Patient is expected to demonstrate progress in strength and range of motion to increase mobility and decrease pain and stiffness. .  Reason for Services/Other Comments:  · Pt has improved in the past with aquatic therapy and should be able to become independent after gains are made with skilled treatment. .   Use of outcome tool(s) and clinical judgement create a POC that gives a: Questionable prediction of patient's progress: MODERATE COMPLEXITY            TREATMENT:   (In addition to Assessment/Re-Assessment sessions the following treatments were rendered)  Pre-treatment Symptoms/Complaints:  Pt complains of pain constantly but mostly with standing for any period.   Pain: Initial:     6/10 Post Session:  4/10   Therapeutic Exercise: ( ):  Exercises per grid below to improve mobility. Required moderate visual and verbal cues to promote proper body alignment, promote proper body posture and promote proper body mechanics. Progressed range and repetitions as indicated. Date:  2/15 Date:   Date:     Activity/Exercise Parameters Parameters Parameters   Standing trunk ex 3 x 10     Prone propping Used table to elevate upper body   5 min     pressups 5x                                 [email or web address] Portal  Treatment/Session Assessment:  Pt had some improvement in symptoms with prone position, corrected posture and standing trunk ext. · Response to Treatment: Pt had difficulty with prone press ups due to arm strength and weight. Also required rest and was sweating. · Compliance with Program/Exercises: Will assess as treatment progresses. · Recommendations/Intent for next treatment session: \"Next visit will focus on mobility, Mimi ex and aquatic. \".   Total Treatment Duration: 60 min  PT Patient Time In/Time Out  Time In: 1200  Time Out: 0100    Brett Urbano PT

## 2022-08-24 ENCOUNTER — HOSPITAL ENCOUNTER (OUTPATIENT)
Dept: LAB | Age: 62
Setting detail: SPECIMEN
Discharge: HOME | End: 2022-08-27

## 2022-08-24 PROCEDURE — 88305 TISSUE EXAM BY PATHOLOGIST: CPT

## (undated) DEVICE — ELECTRODE ES AD DISPER HYDRGEL THN FOAM ADH SCALLOPED EDGE

## (undated) DEVICE — FORCEPS BX L240CM JAW DIA2.8MM L CAP W/ NDL MIC MESH TOOTH

## (undated) DEVICE — SNARE POLYP SM W13MMXL240CM SHTH DIA2.4MM OVL FLX DISP

## (undated) DEVICE — CURVED SHARP RF CANNULA, RADIOPAQUE MARKER: Brand: RADIOPAQUE RADIOFREQUENCY CANNULA

## (undated) DEVICE — Device

## (undated) DEVICE — DRAPE TWL SURG 16X26IN BLU ORB04] ALLCARE INC]

## (undated) DEVICE — PLASTIC ADHESIVE BANDAGE: Brand: CURITY

## (undated) DEVICE — SYR 5ML 1/5 GRAD LL NSAF LF --

## (undated) DEVICE — BREVI-XL™ 19G X 14": Brand: EPIMED

## (undated) DEVICE — CONTAINER PREFIL FRMLN 40ML --

## (undated) DEVICE — 3M™ TEGADERM™ TRANSPARENT FILM DRESSING FRAME STYLE, 1626W, 4 IN X 4-3/4 IN (10 CM X 12 CM), 50/CT 4CT/CASE: Brand: 3M™ TEGADERM™

## (undated) DEVICE — DRAPE SHT 3 QTR PROXIMA 53X77 --

## (undated) DEVICE — SYR 3ML LL TIP 1/10ML GRAD --

## (undated) DEVICE — NDL PRT INJ NSAF BLNT 18GX1.5 --

## (undated) DEVICE — KENDALL RADIOLUCENT FOAM MONITORING ELECTRODE RECTANGULAR SHAPE: Brand: KENDALL

## (undated) DEVICE — CANNULA NSL ORAL AD FOR CAPNOFLEX CO2 O2 AIRLFE

## (undated) DEVICE — CONNECTOR TBNG OD5-7MM O2 END DISP